# Patient Record
Sex: FEMALE | Race: WHITE | NOT HISPANIC OR LATINO | Employment: OTHER | URBAN - METROPOLITAN AREA
[De-identification: names, ages, dates, MRNs, and addresses within clinical notes are randomized per-mention and may not be internally consistent; named-entity substitution may affect disease eponyms.]

---

## 2017-02-24 PROBLEM — K21.9 GASTROESOPHAGEAL REFLUX DISEASE WITHOUT ESOPHAGITIS: Status: ACTIVE | Noted: 2017-02-24

## 2017-02-24 PROBLEM — R07.9 CHEST PAIN OF UNCERTAIN ETIOLOGY: Status: ACTIVE | Noted: 2017-02-24

## 2017-05-04 DIAGNOSIS — Z12.31 VISIT FOR SCREENING MAMMOGRAM: Primary | ICD-10-CM

## 2017-06-05 ENCOUNTER — HOSPITAL ENCOUNTER (OUTPATIENT)
Dept: RADIOLOGY | Facility: HOSPITAL | Age: 72
Discharge: HOME | End: 2017-06-05
Attending: INTERNAL MEDICINE
Payer: MEDICARE

## 2017-06-05 DIAGNOSIS — Z12.31 VISIT FOR SCREENING MAMMOGRAM: ICD-10-CM

## 2017-06-05 PROCEDURE — 77067 SCR MAMMO BI INCL CAD: CPT | Mod: TC

## 2017-06-05 PROCEDURE — 77067 SCR MAMMO BI INCL CAD: CPT | Mod: 26,,, | Performed by: RADIOLOGY

## 2017-09-11 ENCOUNTER — HOSPITAL ENCOUNTER (OUTPATIENT)
Dept: CARDIOLOGY | Facility: HOSPITAL | Age: 72
Discharge: HOME | End: 2017-09-11
Attending: INTERNAL MEDICINE
Payer: MEDICARE

## 2017-09-11 DIAGNOSIS — R07.9 CHEST PAIN OF UNCERTAIN ETIOLOGY: ICD-10-CM

## 2017-09-11 DIAGNOSIS — R06.09 DYSPNEA ON EXERTION: ICD-10-CM

## 2017-09-11 DIAGNOSIS — R94.31 NONSPECIFIC ABNORMAL ELECTROCARDIOGRAM (ECG) (EKG): ICD-10-CM

## 2017-09-11 LAB
DIASTOLIC DYSFUNCTION: NO
RETIRED EF AND QEF - SEE NOTES: 65 (ref 55–65)

## 2017-09-11 PROCEDURE — 93351 STRESS TTE COMPLETE: CPT

## 2017-09-11 NOTE — PROGRESS NOTES
0900 Pt on table, ready for test. Pt states NKDA, denies hx of glaucoma  Connected to monitor EKG and NIBP. 22g R FA piv established.   0926 Emir Zaragoza MD at bedside to explain procedure and obtain consent.  0927 Test started per protocol, see EKG sheet for VS  0928 Dobutamine at 10mcg to increase HR, target HR 126bpm.   0931 Dobutamine at 20mcg, pt tolerating well  0934 Dobutamine at 30mcg, leg exercises and hand ball given.  Target HR obtained, tech taking pics.   0936 Testing phase completed, dobutamine off, NS up at rapid rate for recovery phase.  0943 Recovery phase completed. Pt states feels normal, no distress, NS d/c'd, approx  200cc infused. Iv d/c'd, pt released to cardio.

## 2017-10-12 PROBLEM — R10.9 ACUTE ABDOMINAL PAIN: Status: ACTIVE | Noted: 2017-10-12

## 2017-10-12 PROBLEM — E87.6 HYPOKALEMIA: Status: ACTIVE | Noted: 2017-10-12

## 2017-10-12 PROBLEM — R55 SYNCOPE: Status: ACTIVE | Noted: 2017-10-12

## 2017-10-12 PROBLEM — E83.42 HYPOMAGNESEMIA: Status: ACTIVE | Noted: 2017-10-12

## 2017-10-12 PROBLEM — E87.1 HYPONATREMIA: Status: ACTIVE | Noted: 2017-10-12

## 2017-10-13 PROBLEM — R10.84 GENERALIZED ABDOMINAL PAIN: Status: ACTIVE | Noted: 2017-10-13

## 2017-10-17 PROBLEM — R10.84 GENERALIZED ABDOMINAL PAIN: Status: ACTIVE | Noted: 2017-10-17

## 2017-10-18 PROBLEM — K29.50 CHRONIC GASTRITIS WITHOUT BLEEDING: Status: ACTIVE | Noted: 2017-10-18

## 2017-11-07 ENCOUNTER — OFFICE VISIT (OUTPATIENT)
Dept: UROLOGY | Facility: CLINIC | Age: 72
End: 2017-11-07
Payer: MEDICARE

## 2017-11-07 VITALS
WEIGHT: 135 LBS | HEART RATE: 118 BPM | BODY MASS INDEX: 23.05 KG/M2 | HEIGHT: 64 IN | DIASTOLIC BLOOD PRESSURE: 86 MMHG | SYSTOLIC BLOOD PRESSURE: 137 MMHG

## 2017-11-07 DIAGNOSIS — R10.2 SUPRAPUBIC PAIN: ICD-10-CM

## 2017-11-07 DIAGNOSIS — R30.0 DYSURIA: ICD-10-CM

## 2017-11-07 DIAGNOSIS — N39.0 URINARY TRACT INFECTION WITHOUT HEMATURIA, SITE UNSPECIFIED: Primary | ICD-10-CM

## 2017-11-07 PROCEDURE — 87186 SC STD MICRODIL/AGAR DIL: CPT

## 2017-11-07 PROCEDURE — 87086 URINE CULTURE/COLONY COUNT: CPT

## 2017-11-07 PROCEDURE — 87088 URINE BACTERIA CULTURE: CPT

## 2017-11-07 PROCEDURE — 99204 OFFICE O/P NEW MOD 45 MIN: CPT | Mod: 25,S$GLB,, | Performed by: STUDENT IN AN ORGANIZED HEALTH CARE EDUCATION/TRAINING PROGRAM

## 2017-11-07 PROCEDURE — 99999 PR PBB SHADOW E&M-EST. PATIENT-LVL IV: CPT | Mod: PBBFAC,,, | Performed by: STUDENT IN AN ORGANIZED HEALTH CARE EDUCATION/TRAINING PROGRAM

## 2017-11-07 PROCEDURE — 81002 URINALYSIS NONAUTO W/O SCOPE: CPT | Mod: S$GLB,,, | Performed by: STUDENT IN AN ORGANIZED HEALTH CARE EDUCATION/TRAINING PROGRAM

## 2017-11-07 PROCEDURE — 87077 CULTURE AEROBIC IDENTIFY: CPT

## 2017-11-07 PROCEDURE — 81001 URINALYSIS AUTO W/SCOPE: CPT

## 2017-11-07 RX ORDER — NITROFURANTOIN 25; 75 MG/1; MG/1
100 CAPSULE ORAL 2 TIMES DAILY
Qty: 14 CAPSULE | Refills: 0 | Status: SHIPPED | OUTPATIENT
Start: 2017-11-07 | End: 2018-03-27

## 2017-11-07 RX ORDER — PHENAZOPYRIDINE HYDROCHLORIDE 200 MG/1
200 TABLET, FILM COATED ORAL 3 TIMES DAILY PRN
Qty: 15 TABLET | Refills: 0 | Status: SHIPPED | OUTPATIENT
Start: 2017-11-07 | End: 2017-11-17

## 2017-11-07 RX ORDER — ESTRADIOL 0.1 MG/G
CREAM VAGINAL
COMMUNITY
Start: 2017-10-22 | End: 2020-07-30

## 2017-11-07 NOTE — PROGRESS NOTES
Subjective:       Patient ID: Autumn Harris is a 72 y.o. female.    Chief Complaint:  Urinary tract infection concern  This is a 72 y.o.  female patient that is new to me. She was added on to the clinic today because she was worried about the development of a UTI. She began to have urinary issues since last night. She has been experiencing dysuria. Denies gross hematuria. It appeared cloudy at times. She has been drinking much fluids. She notes that currently the urine is light. She does tub soak occasionally. Does wipe front to back.     She was in the hospital recently from LOC and abdominal issues. She has been following up with GI as an outpatient.     She notes a remote history of a UTI. No UTIs recently.     Lab Results   Component Value Date    CREATININE 0.76 10/18/2017       I personally reviewed the images: CT of abdomen/pelvis with contrast - 10/12/17 - no renal masses. Left simple appearing renal cysts. No hydronephrosis or hydroureter bilaterally. No kidney stones identified.  Kidneys demonstrate no hydronephrosis or obstructing calculus.  Several left renal cysts are demonstrated.       ---  Past Medical History:   Diagnosis Date    Anxiety     COPD (chronic obstructive pulmonary disease)     Depression     Fibromyalgia     High cholesterol     Hypertension     Oral cancer     Urinary tract infection        Past Surgical History:   Procedure Laterality Date    ABDOMINAL SURGERY      [facility]    HYSTERECTOMY      Stomach Ulcer Surgery         No family history on file.    Social History   Substance Use Topics    Smoking status: Former Smoker    Smokeless tobacco: Former User     Quit date: 1/1/1995    Alcohol use No       Current Outpatient Prescriptions on File Prior to Visit   Medication Sig Dispense Refill    azelastine (ASTELIN) 137 mcg (0.1 %) nasal spray 2 SPRAY(S) INTO EACH NOSTRIL TWICE A DAY  5    busPIRone (BUSPAR) 5 MG Tab Take 5 mg by mouth 2 (two) times daily.       clorazepate (TRANXENE) 3.75 MG Tab Take 7.5 mg by mouth 3 (three) times daily.      dicyclomine (BENTYL) 10 MG capsule Take 1 capsule (10 mg total) by mouth before meals and at bedtime as needed (abd pain). 30 capsule 1    fluticasone (FLONASE) 50 mcg/actuation nasal spray 2 SPRAYS DAILY PER NOSTRIL  0    lisinopril 10 MG tablet Take 1 tablet (10 mg total) by mouth once daily. 30 tablet 1    meclizine (ANTIVERT) 12.5 mg tablet       montelukast (SINGULAIR) 10 mg tablet Take 10 mg by mouth once daily.  11    pantoprazole (PROTONIX) 40 MG tablet Take 1 tablet (40 mg total) by mouth once daily. 30 tablet 1    tramadol-acetaminophen 37.5-325 mg (ULTRACET) 37.5-325 mg Tab Take 1 tablet by mouth every 4 (four) hours as needed for Pain.      VENTOLIN HFA 90 mcg/actuation inhaler       zolpidem (AMBIEN) 10 mg Tab Take 5 mg by mouth nightly as needed.      ropinirole (REQUIP) 1 MG tablet       sucralfate (CARAFATE) 1 gram tablet Take 1 tablet (1 g total) by mouth 4 (four) times daily before meals and nightly. 120 tablet 0     No current facility-administered medications on file prior to visit.        Review of patient's allergies indicates:   Allergen Reactions    Pcn [penicillins]        Review of Systems   Constitutional: Negative for activity change.   HENT: Negative for congestion.    Eyes: Negative for visual disturbance.   Respiratory: Negative for shortness of breath.    Cardiovascular: Negative for chest pain.   Gastrointestinal: Negative for abdominal distention.   Musculoskeletal: Negative for gait problem.   Skin: Negative for color change.   Neurological: Negative for dizziness.   Psychiatric/Behavioral: Negative for agitation.       Objective:      Physical Exam   Constitutional: She is oriented to person, place, and time. She appears well-developed.   HENT:   Head: Normocephalic and atraumatic.   Eyes: EOM are normal.   Neck: Normal range of motion.   Cardiovascular: Intact distal pulses.     Pulmonary/Chest: Effort normal.   Musculoskeletal: Normal range of motion.   Neurological: She is alert and oriented to person, place, and time.   Skin: Skin is warm and dry.   Psychiatric: She has a normal mood and affect.       Assessment:       1. Urinary tract infection without hematuria, site unspecified    2. Dysuria    3. Suprapubic pain        Plan:         1. Will send urine specimen for culture, urinalysis. Her POCT urinalysis today demonstrated blood, leukocytes. Her symptoms are consistent with the development of an acute UTI. Will empirically treat with macrobid for antibiotics and pyridium for dysuria. Will call her with results of urine culture.  2. CT reviewed - no adverse findings to put patient at higher risk for urinary tract infections such as kidney stones were identified. Shared this with the patient today.  3. Followup with PCP, followup with me PRN    Urinary tract infection without hematuria, site unspecified  -     Urine culture  -     Urinalysis  -     Urinalysis Microscopic  -     POCT URINE DIPSTICK WITHOUT MICROSCOPE    Dysuria    Suprapubic pain    Other orders  -     phenazopyridine (PYRIDIUM) 200 MG tablet; Take 1 tablet (200 mg total) by mouth 3 (three) times daily as needed for Pain (use it for burning with urination).  Dispense: 15 tablet; Refill: 0  -     nitrofurantoin, macrocrystal-monohydrate, (MACROBID) 100 MG capsule; Take 1 capsule (100 mg total) by mouth 2 (two) times daily.  Dispense: 14 capsule; Refill: 0

## 2017-11-08 LAB
BACTERIA #/AREA URNS AUTO: ABNORMAL /HPF
BILIRUB UR QL STRIP: NEGATIVE
CLARITY UR REFRACT.AUTO: ABNORMAL
COLOR UR AUTO: YELLOW
GLUCOSE UR QL STRIP: NEGATIVE
HGB UR QL STRIP: ABNORMAL
HYALINE CASTS UR QL AUTO: 0 /LPF
KETONES UR QL STRIP: NEGATIVE
LEUKOCYTE ESTERASE UR QL STRIP: ABNORMAL
MICROSCOPIC COMMENT: ABNORMAL
NITRITE UR QL STRIP: POSITIVE
PH UR STRIP: 6 [PH] (ref 5–8)
PROT UR QL STRIP: ABNORMAL
RBC #/AREA URNS AUTO: 11 /HPF (ref 0–4)
SP GR UR STRIP: 1 (ref 1–1.03)
SQUAMOUS #/AREA URNS AUTO: 1 /HPF
URN SPEC COLLECT METH UR: ABNORMAL
UROBILINOGEN UR STRIP-ACNC: NEGATIVE EU/DL
WBC #/AREA URNS AUTO: >100 /HPF (ref 0–5)
WBC CLUMPS UR QL AUTO: ABNORMAL

## 2017-11-10 ENCOUNTER — TELEPHONE (OUTPATIENT)
Dept: UROLOGY | Facility: CLINIC | Age: 72
End: 2017-11-10

## 2017-11-10 LAB — BACTERIA UR CULT: NORMAL

## 2017-11-10 NOTE — TELEPHONE ENCOUNTER
----- Message from Abi Romo MD sent at 11/10/2017 12:57 PM CST -----  Please call patient and notify her that she does have a UTI based off of the urine culture. She is already on macrobid which the bacteria is sensitive to. She is to finish the course of antibiotics.

## 2018-03-21 ENCOUNTER — HOSPITAL ENCOUNTER (EMERGENCY)
Facility: HOSPITAL | Age: 73
Discharge: HOME | End: 2018-03-21
Attending: EMERGENCY MEDICINE
Payer: MEDICARE

## 2018-03-21 VITALS
OXYGEN SATURATION: 99 % | BODY MASS INDEX: 23.99 KG/M2 | TEMPERATURE: 98 F | SYSTOLIC BLOOD PRESSURE: 158 MMHG | DIASTOLIC BLOOD PRESSURE: 76 MMHG | HEIGHT: 65 IN | WEIGHT: 144 LBS | RESPIRATION RATE: 18 BRPM | HEART RATE: 87 BPM
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
## 2018-03-21 DIAGNOSIS — M25.561 PAIN AND SWELLING OF KNEE, RIGHT: ICD-10-CM

## 2018-03-21 DIAGNOSIS — M25.461 PAIN AND SWELLING OF KNEE, RIGHT: ICD-10-CM

## 2018-03-21 DIAGNOSIS — S89.91XA RIGHT KNEE INJURY, INITIAL ENCOUNTER: Primary | ICD-10-CM

## 2018-03-21 PROCEDURE — 25000003 PHARM REV CODE 250: Performed by: PHYSICIAN ASSISTANT

## 2018-03-21 PROCEDURE — 99283 EMERGENCY DEPT VISIT LOW MDM: CPT

## 2018-03-21 RX ORDER — OXYCODONE AND ACETAMINOPHEN 5; 325 MG/1; MG/1
1 TABLET ORAL
Status: COMPLETED | OUTPATIENT
Start: 2018-03-21 | End: 2018-03-21

## 2018-03-21 RX ADMIN — OXYCODONE HYDROCHLORIDE AND ACETAMINOPHEN 0.5 TABLET: 5; 325 TABLET ORAL at 01:03

## 2018-03-21 NOTE — ED NOTES
Attempted to discharge pt, pt request that discharge paperwork be gone over when daughter arrives. Pt states daughter is on her way back

## 2018-03-21 NOTE — ED PROVIDER NOTES
Encounter Date: 3/21/2018       History     Chief Complaint   Patient presents with    Fall     pt tripped and fell yesterday, scraping both knees. C/o pain to entire right leg     72 year old female with PMH of fibromyalgia presents complaining of right knee and leg pain following a fall yesterday at about 7:30PM. She states that she tripped on uneven concrete, fell from standing, and landed primarily on her right knee and both wrists. She states the pain runs along the posterior aspect of her knee, calf, and thigh and describes it as a pulling sensation. She can ambulate with increased pain in the posterior and anterior aspects of her knee. She also says she can not bend her knee fully, that it is swollen, and she does have an abrasion. She denies hip pain, back pain, head trauma, ankle pain, and wrist pain. She denies prior pain to the right knee. She states she took tramadol last night and this morning, as she is prescribed this for fibromyalgia pain.  It did not help with the knee pain.      The history is provided by the patient. No  was used.     Review of patient's allergies indicates:   Allergen Reactions    Pcn [penicillins]     Sulfa (sulfonamide antibiotics)      Past Medical History:   Diagnosis Date    Anxiety     COPD (chronic obstructive pulmonary disease)     Depression     Fibromyalgia     High cholesterol     Hypertension     Oral cancer     Urinary tract infection      Past Surgical History:   Procedure Laterality Date    ABDOMINAL SURGERY      [facility]    HYSTERECTOMY      Stomach Ulcer Surgery       History reviewed. No pertinent family history.  Social History   Substance Use Topics    Smoking status: Former Smoker    Smokeless tobacco: Former User     Quit date: 1/1/1995    Alcohol use No     Review of Systems   Constitutional: Negative for chills and fever.   Respiratory: Negative for cough and chest tightness.    Cardiovascular: Negative for chest pain.    Gastrointestinal: Negative for abdominal pain, constipation, diarrhea, nausea and vomiting.   Musculoskeletal: Positive for arthralgias and joint swelling.        Knee pain   Allergic/Immunologic: Negative for immunocompromised state.   Psychiatric/Behavioral: Negative for confusion.   All other systems reviewed and are negative.      Physical Exam     Initial Vitals [03/21/18 1145]   BP Pulse Resp Temp SpO2   133/83 94 20 98 °F (36.7 °C) 97 %      MAP       99.67         Physical Exam    Nursing note and vitals reviewed.  Constitutional: Vital signs are normal. She appears well-developed and well-nourished. No distress.   HENT:   Head: Normocephalic and atraumatic.   Nose: Nose normal.   Eyes: Conjunctivae and lids are normal.   Neck: Normal range of motion and phonation normal.   Cardiovascular: Intact distal pulses.   Pulmonary/Chest: Effort normal. No respiratory distress.   Abdominal: Normal appearance.   Musculoskeletal:        Right hip: Normal.        Right knee: She exhibits decreased range of motion, swelling and effusion. She exhibits no deformity, no LCL laxity, normal patellar mobility and no MCL laxity.        Right ankle: Normal.        Lumbar back: Normal.        Legs:  Pain with movement of right knee. Large effusion present. Tender to palpation over posterior aspect of knee. Superficial abrasion over patella.   Neurological: She is alert and oriented to person, place, and time.   Decreased strength with hip flexion, knee flexion and extension on right side.    Skin: Skin is warm and dry.   Psychiatric: She has a normal mood and affect. Her speech is normal and behavior is normal. Judgment and thought content normal. Cognition and memory are normal.         ED Course   Procedures  Labs Reviewed - No data to display     Imaging Results          X-Ray Knee 3 View Right (Final result)  Result time 03/21/18 14:04:21    Final result by Valerio Astorga MD (03/21/18 14:04:21)                  Impression:      1. No acute displaced fracture or dislocation of the knee.      Electronically signed by: Valerio Astorga MD  Date:    03/21/2018  Time:    14:04             Narrative:    EXAMINATION:  XR KNEE 3 VIEW RIGHT    CLINICAL HISTORY:  Pain in right knee    TECHNIQUE:  AP, lateral, and Merchant views of the right knee were performed.    COMPARISON:  None    FINDINGS:  Three views.    Degenerative changes are noted of the knee.  No large knee joint effusion.  No radiopaque foreign body.                                   Medical Decision Making:   Initial Assessment:   72 year old female with PMH of Fibromyalgia c/o right knee pain following a fall from standing 1 day ago. Admits to increased pain with ambulation. Denies ankle, hip or back pain. Hip fracture unlikely due to ability to ambulate. PE reveals an abrasion to the anterior aspect of the knee, a large effusion, and tender to palpation along the posterior and anterior aspects of the knee. Decreased ROM with knee flexion and decreased strength with knee flexion and extension. Likely due to effusion.   Differential Diagnosis:   Joint effusion  Patella fracture    Clinical Tests:   Radiological Study: Ordered and Reviewed  ED Management:  X-ray of right knee ordered. Percocet 5-375mg PO 1/2 tablet ordered for pain.     3:00 pm: X-ray showed no fracture or dislocation of knee. Will ACE wrap right knee and patient will be discharged home with instructions to RICE and follow up with orthopaedics. She should continue her home tramadol as prescribed and can add ibuprofen 600mg every 4-6 hours for pain and inflammation.  Instructed to use a walker or cane until she is fully weight bearing and feels secure on the knee.   Patient was discussed with Dr Vasquez who agrees with treatment plan.              Attending Attestation:     Physician Attestation Statement for NP/PA:   I have conducted a face to face encounter with this patient in addition to the NP/PA, due to  NP/PA Request    Other NP/PA Attestation Additions:    History of Present Illness: 72F with right knee pain following fall from standing.  Associated swelling.    Medical Decision Making: Effusion on exam but no fx on xray.  Treat with ACE, RICE.  Motrin and ultram.                    Clinical Impression:   The primary encounter diagnosis was Right knee injury, initial encounter. Diagnoses of Pain and swelling of knee, right and Fall, initial encounter were also pertinent to this visit.                           Josie Vasquez MD  03/21/18 2203

## 2018-03-21 NOTE — ED TRIAGE NOTES
Pt sates she tripped on uneven pavement yesterday scrapping knees, and now has right leg pain with lower back pain. No acute distress noted. Pain worsens with weight bearing, and ambulation. Skin warm and dry. Pt denies loss of consciousness.

## 2018-03-27 ENCOUNTER — OFFICE VISIT (OUTPATIENT)
Dept: CARDIOLOGY | Facility: CLINIC | Age: 73
End: 2018-03-27
Payer: MEDICARE

## 2018-03-27 VITALS
HEIGHT: 65 IN | HEART RATE: 78 BPM | BODY MASS INDEX: 22.89 KG/M2 | DIASTOLIC BLOOD PRESSURE: 70 MMHG | SYSTOLIC BLOOD PRESSURE: 126 MMHG | WEIGHT: 137.38 LBS

## 2018-03-27 DIAGNOSIS — R06.09 DYSPNEA ON EXERTION: ICD-10-CM

## 2018-03-27 DIAGNOSIS — R55 SYNCOPE, UNSPECIFIED SYNCOPE TYPE: ICD-10-CM

## 2018-03-27 DIAGNOSIS — R94.31 NONSPECIFIC ABNORMAL ELECTROCARDIOGRAM (ECG) (EKG): ICD-10-CM

## 2018-03-27 DIAGNOSIS — I35.1 AORTIC VALVE INSUFFICIENCY, ETIOLOGY OF CARDIAC VALVE DISEASE UNSPECIFIED: ICD-10-CM

## 2018-03-27 DIAGNOSIS — I10 ESSENTIAL HYPERTENSION, BENIGN: ICD-10-CM

## 2018-03-27 DIAGNOSIS — I49.1 PREMATURE ATRIAL CONTRACTIONS: ICD-10-CM

## 2018-03-27 DIAGNOSIS — I10 ESSENTIAL HYPERTENSION: Primary | ICD-10-CM

## 2018-03-27 PROCEDURE — 3078F DIAST BP <80 MM HG: CPT | Mod: CPTII,S$GLB,, | Performed by: INTERNAL MEDICINE

## 2018-03-27 PROCEDURE — 99999 PR PBB SHADOW E&M-EST. PATIENT-LVL II: CPT | Mod: PBBFAC,,, | Performed by: INTERNAL MEDICINE

## 2018-03-27 PROCEDURE — 93000 ELECTROCARDIOGRAM COMPLETE: CPT | Mod: S$GLB,,, | Performed by: INTERNAL MEDICINE

## 2018-03-27 PROCEDURE — 99213 OFFICE O/P EST LOW 20 MIN: CPT | Mod: S$GLB,,, | Performed by: INTERNAL MEDICINE

## 2018-03-27 PROCEDURE — 3074F SYST BP LT 130 MM HG: CPT | Mod: CPTII,S$GLB,, | Performed by: INTERNAL MEDICINE

## 2018-03-27 RX ORDER — PANTOPRAZOLE SODIUM 40 MG/1
40 TABLET, DELAYED RELEASE ORAL DAILY
COMMUNITY
End: 2019-04-11

## 2018-03-27 RX ORDER — TRAMADOL HYDROCHLORIDE 50 MG/1
50 TABLET ORAL EVERY 8 HOURS PRN
COMMUNITY
End: 2021-07-13 | Stop reason: SDUPTHER

## 2018-03-27 RX ORDER — DICYCLOMINE HYDROCHLORIDE 10 MG/1
CAPSULE ORAL 3 TIMES DAILY
COMMUNITY
Start: 2018-02-06 | End: 2019-04-11

## 2018-03-27 RX ORDER — TIZANIDINE 4 MG/1
8 TABLET ORAL NIGHTLY
COMMUNITY
Start: 2018-03-03 | End: 2019-07-02

## 2018-03-27 RX ORDER — LISINOPRIL 10 MG/1
10 TABLET ORAL DAILY
Qty: 90 TABLET | Refills: 3 | Status: SHIPPED | OUTPATIENT
Start: 2018-03-27 | End: 2019-05-29 | Stop reason: SDUPTHER

## 2018-03-27 NOTE — PROGRESS NOTES
Subjective:      Patient ID: Autumn Harris is a 72 y.o. female.    Chief Complaint: Follow-up (Hypertension)    HPI:  Feeling Ok.  Pt sees Dr Hernandez  Pt sees Dr Vega.  Pt admitted to [facility] after fainting 10/17 associated with abdominal pain and nausea and vomiting.   Blood pressure meds were reduced=HCTZ was discontinued  .  Pt received PT of weak legs afterwards.    Pt c/o memory problems.    Review of Systems   Cardiovascular: Positive for dyspnea on exertion and palpitations. Negative for chest pain, claudication, irregular heartbeat, leg swelling, near-syncope, orthopnea and syncope.      Heart rhythm was monitored [facility] at [facility]  Past Medical History:   Diagnosis Date    Anxiety     COPD (chronic obstructive pulmonary disease)     Depression     Fibromyalgia     High cholesterol     Hypertension     Oral cancer     Urinary tract infection         Past Surgical History:   Procedure Laterality Date    ABDOMINAL SURGERY      [facility]    HYSTERECTOMY      Stomach Ulcer Surgery         No family history on file.    Social History     Social History    Marital status:      Spouse name: N/A    Number of children: N/A    Years of education: N/A     Social History Main Topics    Smoking status: Former Smoker    Smokeless tobacco: Former User     Quit date: 1/1/1995    Alcohol use No    Drug use: Unknown    Sexual activity: Not Asked     Other Topics Concern    None     Social History Narrative    None       Current Outpatient Prescriptions on File Prior to Visit   Medication Sig Dispense Refill    azelastine (ASTELIN) 137 mcg (0.1 %) nasal spray 2 SPRAY(S) INTO EACH NOSTRIL TWICE A DAY  5    busPIRone (BUSPAR) 5 MG Tab Take 5 mg by mouth 2 (two) times daily.      clorazepate (TRANXENE) 3.75 MG Tab Take 7.5 mg by mouth 2 (two) times daily.       ESTRACE 0.01 % (0.1 mg/gram) vaginal cream       fluticasone (FLONASE) 50 mcg/actuation nasal spray 2 SPRAYS DAILY  "PER NOSTRIL  0    meclizine (ANTIVERT) 12.5 mg tablet as needed.       montelukast (SINGULAIR) 10 mg tablet Take 10 mg by mouth once daily.  11    tramadol-acetaminophen 37.5-325 mg (ULTRACET) 37.5-325 mg Tab Take 1 tablet by mouth every 4 (four) hours as needed for Pain.      VENTOLIN HFA 90 mcg/actuation inhaler Inhale 2 puffs into the lungs as needed.       zolpidem (AMBIEN) 10 mg Tab Take 5 mg by mouth nightly as needed.      [DISCONTINUED] lisinopril 10 MG tablet Take 1 tablet (10 mg total) by mouth once daily. 30 tablet 1    [DISCONTINUED] nitrofurantoin, macrocrystal-monohydrate, (MACROBID) 100 MG capsule Take 1 capsule (100 mg total) by mouth 2 (two) times daily. 14 capsule 0    [DISCONTINUED] ranitidine (ZANTAC) 300 MG tablet TAKE 1 TABLET (300 MG TOTAL) BY MOUTH EVERY EVENING. 30 tablet 11    [DISCONTINUED] ropinirole (REQUIP) 1 MG tablet        No current facility-administered medications on file prior to visit.        Review of patient's allergies indicates:   Allergen Reactions    Pcn [penicillins]     Sulfa (sulfonamide antibiotics)      Objective:     Vitals:    03/27/18 1559   BP: 126/70   BP Location: Left arm   Patient Position: Sitting   BP Method: Large (Automatic)   Pulse: 78   Weight: 62.3 kg (137 lb 6.4 oz)   Height: 5' 5" (1.651 m)        Physical Exam   Constitutional: She is oriented to person, place, and time. She appears well-developed and well-nourished.   Eyes: No scleral icterus.   Neck: No JVD present. Carotid bruit is not present.   Cardiovascular: Normal rate and regular rhythm.  Exam reveals no gallop.    Murmur (II/VI systolic) heard.  Pulmonary/Chest: Breath sounds normal.   Musculoskeletal: She exhibits no edema.   Neurological: She is alert and oriented to person, place, and time.   Skin: Skin is warm and dry.   Psychiatric: She has a normal mood and affect. Her behavior is normal. Judgment and thought content normal.   Vitals reviewed.     Wt down 6 lbs    ECG " today--NSR with PAC and PVC. QS pattern V1-V3, unchanged    Note dobutamine stress echo last year was normal  Assessment:     1. Essential hypertension    2. Nonspecific abnormal electrocardiogram (ECG) (EKG)    3. Premature atrial contractions    4. Aortic valve insufficiency, etiology of cardiac valve disease unspecified    5. Dyspnea on exertion    6. Syncope, unspecified syncope type    7. Essential hypertension, benign      Plan:   Autumn was seen today for follow-up.    Diagnoses and all orders for this visit:    Essential hypertension  -     IN OFFICE EKG 12-LEAD (to [facility])    Nonspecific abnormal electrocardiogram (ECG) (EKG)    Premature atrial contractions    Aortic valve insufficiency, etiology of cardiac valve disease unspecified    Dyspnea on exertion    Syncope, unspecified syncope type  -     IN OFFICE EKG 12-LEAD (to Muse)    Essential hypertension, benign  -     IN OFFICE EKG 12-LEAD (to [place])    Other orders  -     lisinopril 10 MG tablet; Take 1 tablet (10 mg total) by mouth once daily.    Same meds       Follow-up in about 6 months (around 9/27/2018).

## 2018-11-08 ENCOUNTER — OFFICE VISIT (OUTPATIENT)
Dept: CARDIOLOGY | Facility: CLINIC | Age: 73
End: 2018-11-08
Payer: MEDICARE

## 2018-11-08 VITALS
HEART RATE: 85 BPM | OXYGEN SATURATION: 98 % | WEIGHT: 144.19 LBS | DIASTOLIC BLOOD PRESSURE: 69 MMHG | HEIGHT: 65 IN | BODY MASS INDEX: 24.02 KG/M2 | SYSTOLIC BLOOD PRESSURE: 103 MMHG

## 2018-11-08 DIAGNOSIS — R06.09 DYSPNEA ON EXERTION: ICD-10-CM

## 2018-11-08 DIAGNOSIS — I49.1 PREMATURE ATRIAL CONTRACTIONS: ICD-10-CM

## 2018-11-08 DIAGNOSIS — I10 ESSENTIAL HYPERTENSION: Primary | ICD-10-CM

## 2018-11-08 DIAGNOSIS — J41.0 SIMPLE CHRONIC BRONCHITIS: ICD-10-CM

## 2018-11-08 DIAGNOSIS — I35.1 AORTIC VALVE INSUFFICIENCY, ETIOLOGY OF CARDIAC VALVE DISEASE UNSPECIFIED: ICD-10-CM

## 2018-11-08 DIAGNOSIS — R94.31 NONSPECIFIC ABNORMAL ELECTROCARDIOGRAM (ECG) (EKG): ICD-10-CM

## 2018-11-08 PROCEDURE — 1101F PT FALLS ASSESS-DOCD LE1/YR: CPT | Mod: CPTII,S$GLB,, | Performed by: INTERNAL MEDICINE

## 2018-11-08 PROCEDURE — 99213 OFFICE O/P EST LOW 20 MIN: CPT | Mod: S$GLB,,, | Performed by: INTERNAL MEDICINE

## 2018-11-08 PROCEDURE — 3074F SYST BP LT 130 MM HG: CPT | Mod: CPTII,S$GLB,, | Performed by: INTERNAL MEDICINE

## 2018-11-08 PROCEDURE — 99999 PR PBB SHADOW E&M-EST. PATIENT-LVL III: CPT | Mod: PBBFAC,,, | Performed by: INTERNAL MEDICINE

## 2018-11-08 PROCEDURE — 3078F DIAST BP <80 MM HG: CPT | Mod: CPTII,S$GLB,, | Performed by: INTERNAL MEDICINE

## 2018-11-08 PROCEDURE — 93000 ELECTROCARDIOGRAM COMPLETE: CPT | Mod: S$GLB,,, | Performed by: INTERNAL MEDICINE

## 2018-11-08 RX ORDER — POLYETHYLENE GLYCOL 3350 17 G/17G
POWDER, FOR SOLUTION ORAL
Refills: 5 | COMMUNITY
Start: 2018-10-30 | End: 2021-07-13

## 2018-11-08 NOTE — PROGRESS NOTES
"  Subjective:      Patient ID: Autumn Harris is a 73 y.o. female.    Chief Complaint: Follow-up (Hypertension) and Dizziness    HPI: Scheduled for Eply exercises per Dr Cedeño for dizziness.  Had basal cell CA removed from face.    Review of Systems   Cardiovascular: Positive for dyspnea on exertion (mild, chronic). Negative for chest pain, claudication, irregular heartbeat, leg swelling, near-syncope, orthopnea, palpitations and syncope.      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     Mows the lawn.    "I have fibromyalgia"  Past Medical History:   Diagnosis Date    Anxiety     COPD (chronic obstructive pulmonary disease)     Depression     Fibromyalgia     High cholesterol     Hypertension     Oral cancer     Urinary tract infection         Past Surgical History:   Procedure Laterality Date    ABDOMINAL SURGERY      [facility]    ESOPHAGOGASTRODUODENOSCOPY (EGD) N/A 10/17/2017    [facility]    HYSTERECTOMY      Stomach Ulcer Surgery      ULTRASOUND-ENDOSCOPIC-UPPER Left 10/17/2017    [facility] at [facility] ENDO       No family history on file.    Social History     Socioeconomic History    Marital status:      Spouse name: None    Number of children: None    Years of education: None    Highest education level: None   Social Needs    Financial resource strain: None    Food insecurity - worry: None    Food insecurity - inability: None    Transportation needs - medical: None    Transportation needs - non-medical: None   Occupational History    None   Tobacco Use    Smoking status: Former Smoker    Smokeless tobacco: Former User     Quit date: 1/1/1995   Substance and Sexual Activity    Alcohol use: No    Drug use: None    Sexual activity: None   Other Topics Concern    None   Social History Narrative    None       Current Outpatient Medications on File Prior to Visit   Medication Sig Dispense Refill    " "busPIRone (BUSPAR) 5 MG Tab Take 5 mg by mouth 2 (two) times daily.      clorazepate (TRANXENE) 3.75 MG Tab Take 7.5 mg by mouth 2 (two) times daily.       dicyclomine (BENTYL) 10 MG capsule 3 (three) times daily.      ESTRACE 0.01 % (0.1 mg/gram) vaginal cream       lisinopril 10 MG tablet Take 1 tablet (10 mg total) by mouth once daily. 90 tablet 3    meclizine (ANTIVERT) 12.5 mg tablet as needed.       montelukast (SINGULAIR) 10 mg tablet Take 10 mg by mouth once daily.  11    pantoprazole (PROTONIX) 40 MG tablet Take 40 mg by mouth once daily.      tiZANidine (ZANAFLEX) 4 MG tablet 8 mg every evening.      traMADol (ULTRAM) 50 mg tablet Take 50 mg by mouth every 8 (eight) hours as needed for Pain.      VENTOLIN HFA 90 mcg/actuation inhaler Inhale 2 puffs into the lungs as needed.       zolpidem (AMBIEN) 10 mg Tab Take 5 mg by mouth nightly as needed.      azelastine (ASTELIN) 137 mcg (0.1 %) nasal spray 2 SPRAY(S) INTO EACH NOSTRIL TWICE A DAY  5    fluticasone (FLONASE) 50 mcg/actuation nasal spray 2 SPRAYS DAILY PER NOSTRIL  0    polyethylene glycol (GLYCOLAX) 17 gram/dose powder MIX 17 G WITH LIQUID EVERY DAY BY ORAL ROUTE FOR 30 DAYS  5    [DISCONTINUED] tramadol-acetaminophen 37.5-325 mg (ULTRACET) 37.5-325 mg Tab Take 1 tablet by mouth every 4 (four) hours as needed for Pain.       No current facility-administered medications on file prior to visit.        Review of patient's allergies indicates:   Allergen Reactions    Pcn [penicillins]     Sulfa (sulfonamide antibiotics)      Objective:     Vitals:    11/08/18 1503   BP: 103/69   BP Location: Left arm   Patient Position: Sitting   BP Method: Large (Automatic)   Pulse: 85   SpO2: 98%   Weight: 65.4 kg (144 lb 3.2 oz)   Height: 5' 5" (1.651 m)        Physical Exam   Constitutional: She is oriented to person, place, and time. She appears well-developed and well-nourished.   Eyes: No scleral icterus.   Neck: No JVD present. Carotid bruit is " not present.   Cardiovascular: Normal rate and regular rhythm. Exam reveals no gallop.   No murmur heard.  Pulmonary/Chest: Breath sounds normal.   Musculoskeletal: She exhibits no edema.   Neurological: She is alert and oriented to person, place, and time.   Skin: Skin is warm and dry.   Psychiatric: She has a normal mood and affect. Her behavior is normal. Judgment and thought content normal.   Vitals reviewed.     ECG: NSR, QS pattern V1 to V3 c/w anteroseptal infarct, unchanged    Note normal dobutamine stress echo in 2017  Assessment:     1. Essential hypertension    2. Nonspecific abnormal electrocardiogram (ECG) (EKG)    3. Premature atrial contractions    4. Aortic valve insufficiency, etiology of cardiac valve disease unspecified    5. Dyspnea on exertion    6. Simple chronic bronchitis      Plan:   Autumn was seen today for follow-up and dizziness.    Diagnoses and all orders for this visit:    Essential hypertension    Nonspecific abnormal electrocardiogram (ECG) (EKG)    Premature atrial contractions    Aortic valve insufficiency, etiology of cardiac valve disease unspecified    Dyspnea on exertion    Simple chronic bronchitis    Other orders  -     IN OFFICE EKG 12-LEAD (to [facility])     Same meds  RTC 6 months  F/u with Dr Vega who does labs  F/u with Dr Cedeño for dizziness      Follow-up in about 6 months (around 5/8/2019).

## 2019-03-11 DIAGNOSIS — Z12.39 BREAST SCREENING: Primary | ICD-10-CM

## 2019-03-22 ENCOUNTER — HOSPITAL ENCOUNTER (OUTPATIENT)
Dept: RADIOLOGY | Facility: HOSPITAL | Age: 74
Discharge: HOME | End: 2019-03-22
Attending: INTERNAL MEDICINE
Payer: MEDICARE

## 2019-03-22 DIAGNOSIS — Z12.39 BREAST SCREENING: ICD-10-CM

## 2019-03-22 PROCEDURE — 77067 SCR MAMMO BI INCL CAD: CPT | Mod: TC

## 2019-03-22 PROCEDURE — 77067 MAMMO DIGITAL SCREENING BILAT WITH CAD: ICD-10-PCS | Mod: 26,,, | Performed by: RADIOLOGY

## 2019-03-22 PROCEDURE — 77067 SCR MAMMO BI INCL CAD: CPT | Mod: 26,,, | Performed by: RADIOLOGY

## 2019-04-11 ENCOUNTER — OFFICE VISIT (OUTPATIENT)
Dept: CARDIOLOGY | Facility: CLINIC | Age: 74
End: 2019-04-11
Payer: MEDICARE

## 2019-04-11 VITALS
WEIGHT: 139.31 LBS | HEIGHT: 65 IN | DIASTOLIC BLOOD PRESSURE: 85 MMHG | BODY MASS INDEX: 23.21 KG/M2 | HEART RATE: 66 BPM | SYSTOLIC BLOOD PRESSURE: 128 MMHG

## 2019-04-11 DIAGNOSIS — I35.1 AORTIC VALVE INSUFFICIENCY, ETIOLOGY OF CARDIAC VALVE DISEASE UNSPECIFIED: ICD-10-CM

## 2019-04-11 DIAGNOSIS — I49.1 PREMATURE ATRIAL CONTRACTIONS: ICD-10-CM

## 2019-04-11 DIAGNOSIS — R94.31 NONSPECIFIC ABNORMAL ELECTROCARDIOGRAM (ECG) (EKG): ICD-10-CM

## 2019-04-11 DIAGNOSIS — R06.09 DYSPNEA ON EXERTION: ICD-10-CM

## 2019-04-11 DIAGNOSIS — K21.9 GASTROESOPHAGEAL REFLUX DISEASE WITHOUT ESOPHAGITIS: ICD-10-CM

## 2019-04-11 DIAGNOSIS — J41.0 SIMPLE CHRONIC BRONCHITIS: ICD-10-CM

## 2019-04-11 DIAGNOSIS — I10 ESSENTIAL HYPERTENSION: Primary | ICD-10-CM

## 2019-04-11 PROCEDURE — 1101F PT FALLS ASSESS-DOCD LE1/YR: CPT | Mod: CPTII,S$GLB,, | Performed by: INTERNAL MEDICINE

## 2019-04-11 PROCEDURE — 3074F PR MOST RECENT SYSTOLIC BLOOD PRESSURE < 130 MM HG: ICD-10-PCS | Mod: CPTII,S$GLB,, | Performed by: INTERNAL MEDICINE

## 2019-04-11 PROCEDURE — 3074F SYST BP LT 130 MM HG: CPT | Mod: CPTII,S$GLB,, | Performed by: INTERNAL MEDICINE

## 2019-04-11 PROCEDURE — 93000 ELECTROCARDIOGRAM COMPLETE: CPT | Mod: S$GLB,,, | Performed by: INTERNAL MEDICINE

## 2019-04-11 PROCEDURE — 99999 PR PBB SHADOW E&M-EST. PATIENT-LVL III: CPT | Mod: PBBFAC,,, | Performed by: INTERNAL MEDICINE

## 2019-04-11 PROCEDURE — 99214 OFFICE O/P EST MOD 30 MIN: CPT | Mod: S$GLB,,, | Performed by: INTERNAL MEDICINE

## 2019-04-11 PROCEDURE — 99214 PR OFFICE/OUTPT VISIT, EST, LEVL IV, 30-39 MIN: ICD-10-PCS | Mod: S$GLB,,, | Performed by: INTERNAL MEDICINE

## 2019-04-11 PROCEDURE — 99999 PR PBB SHADOW E&M-EST. PATIENT-LVL III: ICD-10-PCS | Mod: PBBFAC,,, | Performed by: INTERNAL MEDICINE

## 2019-04-11 PROCEDURE — 3079F DIAST BP 80-89 MM HG: CPT | Mod: CPTII,S$GLB,, | Performed by: INTERNAL MEDICINE

## 2019-04-11 PROCEDURE — 93000 EKG 12-LEAD: ICD-10-PCS | Mod: S$GLB,,, | Performed by: INTERNAL MEDICINE

## 2019-04-11 PROCEDURE — 1101F PR PT FALLS ASSESS DOC 0-1 FALLS W/OUT INJ PAST YR: ICD-10-PCS | Mod: CPTII,S$GLB,, | Performed by: INTERNAL MEDICINE

## 2019-04-11 PROCEDURE — 3079F PR MOST RECENT DIASTOLIC BLOOD PRESSURE 80-89 MM HG: ICD-10-PCS | Mod: CPTII,S$GLB,, | Performed by: INTERNAL MEDICINE

## 2019-04-11 NOTE — PROGRESS NOTES
Subjective:      Patient ID: Autumn Harris is a 73 y.o. female.    Chief Complaint: Shortness of Breath    HPI:  More short of breath of late.    Pt felt short of breath when bringing in groceries yesterday associated with weakness.    Review of Systems   Cardiovascular: Positive for dyspnea on exertion and irregular heartbeat. Negative for chest pain, claudication, leg swelling, near-syncope, orthopnea, palpitations and syncope.      Holding pantoprazole since GERD is not an issue    Feeling weak overall.    Quit cutting grass.    Pt had labs done recently for Dr Vega which were OK.    Takes meclizine rarely for dizziness.      Past Medical History:   Diagnosis Date    Anxiety     COPD (chronic obstructive pulmonary disease)     Depression     Fibromyalgia     High cholesterol     Hypertension     Oral cancer     Urinary tract infection         Past Surgical History:   Procedure Laterality Date    ABDOMINAL SURGERY      [facility]    ESOPHAGOGASTRODUODENOSCOPY (EGD) N/A 10/17/2017    Performed by [facility] at [facility]    HYSTERECTOMY      OOPHORECTOMY      only one removed    Stomach Ulcer Surgery      ULTRASOUND-ENDOSCOPIC-UPPER Left 10/17/2017    Performed by [facility] MD at [facility]       No family history on file.    Social History     Socioeconomic History    Marital status:      Spouse name: Not on file    Number of children: Not on file    Years of education: Not on file    Highest education level: Not on file   Occupational History    Not on file   Social Needs    Financial resource strain: Not on file    Food insecurity:     Worry: Not on file     Inability: Not on file    Transportation needs:     Medical: Not on file     Non-medical: Not on file   Tobacco Use    Smoking status: Former Smoker    Smokeless tobacco: Former User     Quit date: 1/1/1995   Substance and Sexual Activity    Alcohol use: No    Drug use: Not on file    Sexual  activity: Not on file   Lifestyle    Physical activity:     Days per week: Not on file     Minutes per session: Not on file    Stress: Not on file   Relationships    Social connections:     Talks on phone: Not on file     Gets together: Not on file     Attends Zoroastrianism service: Not on file     Active member of club or organization: Not on file     Attends meetings of clubs or organizations: Not on file     Relationship status: Not on file   Other Topics Concern    Not on file   Social History Narrative    Not on file       Current Outpatient Medications on File Prior to Visit   Medication Sig Dispense Refill    azelastine (ASTELIN) 137 mcg (0.1 %) nasal spray 2 SPRAY(S) INTO EACH NOSTRIL TWICE A DAY  5    busPIRone (BUSPAR) 5 MG Tab Take 5 mg by mouth 2 (two) times daily.      clorazepate (TRANXENE) 3.75 MG Tab Take 7.5 mg by mouth 2 (two) times daily.       ESTRACE 0.01 % (0.1 mg/gram) vaginal cream       fluticasone (FLONASE) 50 mcg/actuation nasal spray 2 SPRAYS DAILY PER NOSTRIL  0    lisinopril 10 MG tablet Take 1 tablet (10 mg total) by mouth once daily. 90 tablet 3    meclizine (ANTIVERT) 12.5 mg tablet as needed.       montelukast (SINGULAIR) 10 mg tablet Take 10 mg by mouth once daily.  11    polyethylene glycol (GLYCOLAX) 17 gram/dose powder MIX 17 G WITH LIQUID EVERY DAY BY ORAL ROUTE FOR 30 DAYS  5    tiZANidine (ZANAFLEX) 4 MG tablet 8 mg every evening.      traMADol (ULTRAM) 50 mg tablet Take 50 mg by mouth every 8 (eight) hours as needed for Pain.      VENTOLIN HFA 90 mcg/actuation inhaler Inhale 2 puffs into the lungs as needed.       zolpidem (AMBIEN) 10 mg Tab Take 5 mg by mouth nightly as needed.      [DISCONTINUED] dicyclomine (BENTYL) 10 MG capsule 3 (three) times daily.      [DISCONTINUED] pantoprazole (PROTONIX) 40 MG tablet Take 40 mg by mouth once daily.       No current facility-administered medications on file prior to visit.        Review of patient's allergies  "indicates:   Allergen Reactions    Pcn [penicillins]     Sulfa (sulfonamide antibiotics)      Objective:     Vitals:    04/11/19 1408   BP: 128/85   BP Location: Right arm   Patient Position: Sitting   BP Method: Large (Automatic)   Pulse: 66   Weight: 63.2 kg (139 lb 5.3 oz)   Height: 5' 5" (1.651 m)        Physical Exam   Constitutional: She is oriented to person, place, and time. She appears well-developed and well-nourished.   Eyes: No scleral icterus.   Neck: No JVD present. Carotid bruit is not present.   Cardiovascular: Normal rate and regular rhythm. Exam reveals no gallop.   No murmur heard.  Pulmonary/Chest: Breath sounds normal.   Musculoskeletal: She exhibits no edema.   Neurological: She is alert and oriented to person, place, and time.   Skin: Skin is warm and dry.   Psychiatric: She has a normal mood and affect. Her behavior is normal. Judgment and thought content normal.   Vitals reviewed.     ECG: NSR, possible anteroseptal infarct, unchanged  Assessment:     1. Essential hypertension    2. Nonspecific abnormal electrocardiogram (ECG) (EKG)    3. Premature atrial contractions    4. Aortic valve insufficiency, etiology of cardiac valve disease unspecified    5. Simple chronic bronchitis    6. Dyspnea on exertion    7. Gastroesophageal reflux disease without esophagitis      Plan:   Autumn was seen today for shortness of breath.    Diagnoses and all orders for this visit:    Essential hypertension  -     IN OFFICE EKG 12-LEAD (to Muse)    Nonspecific abnormal electrocardiogram (ECG) (EKG)  -     IN OFFICE EKG 12-LEAD (to Muse)  -     Transthoracic echo (TTE) 2D with Color Flow; Future    Premature atrial contractions    Aortic valve insufficiency, etiology of cardiac valve disease unspecified    Simple chronic bronchitis    Dyspnea on exertion  -     IN OFFICE EKG 12-LEAD (to Muse)  -     Transthoracic echo (TTE) 2D with Color Flow; Future    Gastroesophageal reflux disease without esophagitis     " Same meds    Repeat CXR and echocardiogram due to worse shortness of breath    No follow-ups on file.

## 2019-05-29 RX ORDER — LISINOPRIL 10 MG/1
10 TABLET ORAL DAILY
Qty: 90 TABLET | Refills: 3 | Status: SHIPPED | OUTPATIENT
Start: 2019-05-29 | End: 2019-10-17 | Stop reason: SINTOL

## 2019-07-02 ENCOUNTER — HOSPITAL ENCOUNTER (OUTPATIENT)
Facility: HOSPITAL | Age: 74
Discharge: HOME | End: 2019-07-03
Attending: INTERNAL MEDICINE | Admitting: INTERNAL MEDICINE
Payer: MEDICARE

## 2019-07-02 DIAGNOSIS — J44.1 COPD EXACERBATION: ICD-10-CM

## 2019-07-02 DIAGNOSIS — E87.29 HIGH ANION GAP METABOLIC ACIDOSIS: ICD-10-CM

## 2019-07-02 DIAGNOSIS — J18.9 PNEUMONIA OF RIGHT LOWER LOBE DUE TO INFECTIOUS ORGANISM: ICD-10-CM

## 2019-07-02 DIAGNOSIS — J02.9 SORE THROAT: ICD-10-CM

## 2019-07-02 DIAGNOSIS — R06.02 SHORTNESS OF BREATH: ICD-10-CM

## 2019-07-02 DIAGNOSIS — R06.02 SOB (SHORTNESS OF BREATH): Primary | ICD-10-CM

## 2019-07-02 DIAGNOSIS — F41.9 ANXIETY: ICD-10-CM

## 2019-07-02 DIAGNOSIS — I10 ESSENTIAL HYPERTENSION: ICD-10-CM

## 2019-07-02 PROBLEM — Z85.89 HISTORY OF SQUAMOUS CELL CARCINOMA: Status: ACTIVE | Noted: 2019-07-02

## 2019-07-02 LAB
ALBUMIN SERPL BCP-MCNC: 4.1 G/DL (ref 3.5–5.2)
ALP SERPL-CCNC: 114 U/L (ref 55–135)
ALT SERPL W/O P-5'-P-CCNC: 15 U/L (ref 10–44)
ANION GAP SERPL CALC-SCNC: 14 MMOL/L (ref 8–16)
AST SERPL-CCNC: 24 U/L (ref 10–40)
BASOPHILS # BLD AUTO: 0.08 K/UL (ref 0–0.2)
BASOPHILS NFR BLD: 1.1 % (ref 0–1.9)
BILIRUB SERPL-MCNC: 0.6 MG/DL (ref 0.1–1)
BNP SERPL-MCNC: 46 PG/ML (ref 0–99)
BUN SERPL-MCNC: 6 MG/DL (ref 8–23)
CALCIUM SERPL-MCNC: 10.4 MG/DL (ref 8.7–10.5)
CHLORIDE SERPL-SCNC: 108 MMOL/L (ref 95–110)
CO2 SERPL-SCNC: 17 MMOL/L (ref 23–29)
CREAT SERPL-MCNC: 0.8 MG/DL (ref 0.5–1.4)
DIFFERENTIAL METHOD: NORMAL
EOSINOPHIL # BLD AUTO: 0.3 K/UL (ref 0–0.5)
EOSINOPHIL NFR BLD: 3.6 % (ref 0–8)
ERYTHROCYTE [DISTWIDTH] IN BLOOD BY AUTOMATED COUNT: 14.3 % (ref 11.5–14.5)
EST. GFR  (AFRICAN AMERICAN): >60 ML/MIN/1.73 M^2
EST. GFR  (NON AFRICAN AMERICAN): >60 ML/MIN/1.73 M^2
GLUCOSE SERPL-MCNC: 114 MG/DL (ref 70–110)
HCT VFR BLD AUTO: 40.2 % (ref 37–48.5)
HGB BLD-MCNC: 12.9 G/DL (ref 12–16)
LACTATE SERPL-SCNC: 3.8 MMOL/L (ref 0.5–2.2)
LACTATE SERPL-SCNC: 8 MMOL/L (ref 0.5–2.2)
LYMPHOCYTES # BLD AUTO: 2.5 K/UL (ref 1–4.8)
LYMPHOCYTES NFR BLD: 33.7 % (ref 18–48)
MCH RBC QN AUTO: 27.3 PG (ref 27–31)
MCHC RBC AUTO-ENTMCNC: 32.1 G/DL (ref 32–36)
MCV RBC AUTO: 85 FL (ref 82–98)
MONOCYTES # BLD AUTO: 1 K/UL (ref 0.3–1)
MONOCYTES NFR BLD: 12.8 % (ref 4–15)
NEUTROPHILS # BLD AUTO: 3.6 K/UL (ref 1.8–7.7)
NEUTROPHILS NFR BLD: 48.8 % (ref 38–73)
PLATELET # BLD AUTO: 236 K/UL (ref 150–350)
PMV BLD AUTO: 10.9 FL (ref 9.2–12.9)
POTASSIUM SERPL-SCNC: 3.9 MMOL/L (ref 3.5–5.1)
PROCALCITONIN SERPL IA-MCNC: 0.04 NG/ML
PROT SERPL-MCNC: 7.6 G/DL (ref 6–8.4)
RBC # BLD AUTO: 4.73 M/UL (ref 4–5.4)
SODIUM SERPL-SCNC: 139 MMOL/L (ref 136–145)
TROPONIN I SERPL DL<=0.01 NG/ML-MCNC: <0.006 NG/ML (ref 0–0.03)
WBC # BLD AUTO: 7.41 K/UL (ref 3.9–12.7)

## 2019-07-02 PROCEDURE — 25000003 PHARM REV CODE 250: Performed by: STUDENT IN AN ORGANIZED HEALTH CARE EDUCATION/TRAINING PROGRAM

## 2019-07-02 PROCEDURE — 85025 COMPLETE CBC W/AUTO DIFF WBC: CPT

## 2019-07-02 PROCEDURE — 96366 THER/PROPH/DIAG IV INF ADDON: CPT

## 2019-07-02 PROCEDURE — 83880 ASSAY OF NATRIURETIC PEPTIDE: CPT

## 2019-07-02 PROCEDURE — 99285 EMERGENCY DEPT VISIT HI MDM: CPT | Mod: 25

## 2019-07-02 PROCEDURE — 84484 ASSAY OF TROPONIN QUANT: CPT

## 2019-07-02 PROCEDURE — 96375 TX/PRO/DX INJ NEW DRUG ADDON: CPT

## 2019-07-02 PROCEDURE — 25000003 PHARM REV CODE 250: Performed by: NURSE PRACTITIONER

## 2019-07-02 PROCEDURE — 96361 HYDRATE IV INFUSION ADD-ON: CPT

## 2019-07-02 PROCEDURE — 63600175 PHARM REV CODE 636 W HCPCS: Performed by: NURSE PRACTITIONER

## 2019-07-02 PROCEDURE — G0378 HOSPITAL OBSERVATION PER HR: HCPCS

## 2019-07-02 PROCEDURE — 84145 PROCALCITONIN (PCT): CPT

## 2019-07-02 PROCEDURE — 96365 THER/PROPH/DIAG IV INF INIT: CPT

## 2019-07-02 PROCEDURE — 25000242 PHARM REV CODE 250 ALT 637 W/ HCPCS: Performed by: NURSE PRACTITIONER

## 2019-07-02 PROCEDURE — 83605 ASSAY OF LACTIC ACID: CPT | Mod: 91

## 2019-07-02 PROCEDURE — 80053 COMPREHEN METABOLIC PANEL: CPT

## 2019-07-02 PROCEDURE — 93010 ELECTROCARDIOGRAM REPORT: CPT | Mod: ,,, | Performed by: STUDENT IN AN ORGANIZED HEALTH CARE EDUCATION/TRAINING PROGRAM

## 2019-07-02 PROCEDURE — 94640 AIRWAY INHALATION TREATMENT: CPT

## 2019-07-02 PROCEDURE — 36415 COLL VENOUS BLD VENIPUNCTURE: CPT

## 2019-07-02 PROCEDURE — 93005 ELECTROCARDIOGRAM TRACING: CPT

## 2019-07-02 PROCEDURE — 93010 EKG 12-LEAD: ICD-10-PCS | Mod: ,,, | Performed by: STUDENT IN AN ORGANIZED HEALTH CARE EDUCATION/TRAINING PROGRAM

## 2019-07-02 RX ORDER — LISINOPRIL 10 MG/1
10 TABLET ORAL DAILY
Status: DISCONTINUED | OUTPATIENT
Start: 2019-07-03 | End: 2019-07-03 | Stop reason: HOSPADM

## 2019-07-02 RX ORDER — BUSPIRONE HYDROCHLORIDE 5 MG/1
5 TABLET ORAL 2 TIMES DAILY
Status: DISCONTINUED | OUTPATIENT
Start: 2019-07-02 | End: 2019-07-03 | Stop reason: HOSPADM

## 2019-07-02 RX ORDER — ACETAMINOPHEN 325 MG/1
650 TABLET ORAL
Status: COMPLETED | OUTPATIENT
Start: 2019-07-02 | End: 2019-07-02

## 2019-07-02 RX ORDER — BENZONATATE 100 MG/1
100 CAPSULE ORAL 3 TIMES DAILY PRN
Status: DISCONTINUED | OUTPATIENT
Start: 2019-07-02 | End: 2019-07-03 | Stop reason: HOSPADM

## 2019-07-02 RX ORDER — PANTOPRAZOLE SODIUM 40 MG/1
40 TABLET, DELAYED RELEASE ORAL DAILY
Status: DISCONTINUED | OUTPATIENT
Start: 2019-07-03 | End: 2019-07-03 | Stop reason: HOSPADM

## 2019-07-02 RX ORDER — ENOXAPARIN SODIUM 100 MG/ML
40 INJECTION SUBCUTANEOUS DAILY
Status: DISCONTINUED | OUTPATIENT
Start: 2019-07-03 | End: 2019-07-03 | Stop reason: HOSPADM

## 2019-07-02 RX ORDER — SODIUM CHLORIDE, SODIUM LACTATE, POTASSIUM CHLORIDE, CALCIUM CHLORIDE 600; 310; 30; 20 MG/100ML; MG/100ML; MG/100ML; MG/100ML
INJECTION, SOLUTION INTRAVENOUS CONTINUOUS
Status: DISCONTINUED | OUTPATIENT
Start: 2019-07-02 | End: 2019-07-02

## 2019-07-02 RX ORDER — PANTOPRAZOLE SODIUM 40 MG/1
40 TABLET, DELAYED RELEASE ORAL DAILY
COMMUNITY
End: 2020-07-30

## 2019-07-02 RX ORDER — LORAZEPAM 0.5 MG/1
0.5 TABLET ORAL
Status: COMPLETED | OUTPATIENT
Start: 2019-07-02 | End: 2019-07-02

## 2019-07-02 RX ORDER — MONTELUKAST SODIUM 10 MG/1
10 TABLET ORAL DAILY
Status: DISCONTINUED | OUTPATIENT
Start: 2019-07-03 | End: 2019-07-03 | Stop reason: HOSPADM

## 2019-07-02 RX ORDER — CLORAZEPATE DIPOTASSIUM 3.75 MG/1
3.75 TABLET ORAL 2 TIMES DAILY
Status: DISCONTINUED | OUTPATIENT
Start: 2019-07-02 | End: 2019-07-03 | Stop reason: HOSPADM

## 2019-07-02 RX ORDER — IPRATROPIUM BROMIDE AND ALBUTEROL SULFATE 2.5; .5 MG/3ML; MG/3ML
3 SOLUTION RESPIRATORY (INHALATION) EVERY 4 HOURS PRN
Status: DISCONTINUED | OUTPATIENT
Start: 2019-07-02 | End: 2019-07-03 | Stop reason: HOSPADM

## 2019-07-02 RX ORDER — SODIUM CHLORIDE 9 MG/ML
1000 INJECTION, SOLUTION INTRAVENOUS
Status: COMPLETED | OUTPATIENT
Start: 2019-07-02 | End: 2019-07-02

## 2019-07-02 RX ORDER — IPRATROPIUM BROMIDE AND ALBUTEROL SULFATE 2.5; .5 MG/3ML; MG/3ML
3 SOLUTION RESPIRATORY (INHALATION)
Status: COMPLETED | OUTPATIENT
Start: 2019-07-02 | End: 2019-07-02

## 2019-07-02 RX ORDER — LEVOFLOXACIN 5 MG/ML
750 INJECTION, SOLUTION INTRAVENOUS
Status: COMPLETED | OUTPATIENT
Start: 2019-07-02 | End: 2019-07-02

## 2019-07-02 RX ORDER — ENOXAPARIN SODIUM 100 MG/ML
40 INJECTION SUBCUTANEOUS EVERY 24 HOURS
Status: DISCONTINUED | OUTPATIENT
Start: 2019-07-03 | End: 2019-07-02

## 2019-07-02 RX ORDER — SODIUM CHLORIDE 9 MG/ML
INJECTION, SOLUTION INTRAVENOUS CONTINUOUS
Status: ACTIVE | OUTPATIENT
Start: 2019-07-02 | End: 2019-07-03

## 2019-07-02 RX ORDER — TRAMADOL HYDROCHLORIDE AND ACETAMINOPHEN 37.5; 325 MG/1; MG/1
1 TABLET, FILM COATED ORAL ONCE
Status: COMPLETED | OUTPATIENT
Start: 2019-07-02 | End: 2019-07-02

## 2019-07-02 RX ORDER — METHYLPREDNISOLONE SOD SUCC 125 MG
125 VIAL (EA) INJECTION
Status: COMPLETED | OUTPATIENT
Start: 2019-07-02 | End: 2019-07-02

## 2019-07-02 RX ORDER — LEVOFLOXACIN 5 MG/ML
750 INJECTION, SOLUTION INTRAVENOUS
Status: DISCONTINUED | OUTPATIENT
Start: 2019-07-03 | End: 2019-07-02

## 2019-07-02 RX ADMIN — TRAMADOL HYDROCHLORIDE AND ACETAMINOPHEN 1 TABLET: 37.5; 325 TABLET ORAL at 09:07

## 2019-07-02 RX ADMIN — SODIUM CHLORIDE, SODIUM LACTATE, POTASSIUM CHLORIDE, AND CALCIUM CHLORIDE 1000 ML: .6; .31; .03; .02 INJECTION, SOLUTION INTRAVENOUS at 07:07

## 2019-07-02 RX ADMIN — SODIUM CHLORIDE 1000 ML: 0.9 INJECTION, SOLUTION INTRAVENOUS at 03:07

## 2019-07-02 RX ADMIN — IPRATROPIUM BROMIDE AND ALBUTEROL SULFATE 3 ML: .5; 3 SOLUTION RESPIRATORY (INHALATION) at 05:07

## 2019-07-02 RX ADMIN — CLORAZEPATE DIPOTASSIUM 3.75 MG: 3.75 TABLET ORAL at 09:07

## 2019-07-02 RX ADMIN — IPRATROPIUM BROMIDE AND ALBUTEROL SULFATE 3 ML: .5; 3 SOLUTION RESPIRATORY (INHALATION) at 02:07

## 2019-07-02 RX ADMIN — LEVOFLOXACIN 750 MG: 750 INJECTION, SOLUTION INTRAVENOUS at 03:07

## 2019-07-02 RX ADMIN — METHYLPREDNISOLONE SODIUM SUCCINATE 125 MG: 125 INJECTION, POWDER, FOR SOLUTION INTRAMUSCULAR; INTRAVENOUS at 03:07

## 2019-07-02 RX ADMIN — SODIUM CHLORIDE, SODIUM LACTATE, POTASSIUM CHLORIDE, AND CALCIUM CHLORIDE: .6; .31; .03; .02 INJECTION, SOLUTION INTRAVENOUS at 09:07

## 2019-07-02 RX ADMIN — LORAZEPAM 0.5 MG: 0.5 TABLET ORAL at 03:07

## 2019-07-02 RX ADMIN — ACETAMINOPHEN 650 MG: 325 TABLET ORAL at 05:07

## 2019-07-02 RX ADMIN — BENZONATATE 100 MG: 100 CAPSULE ORAL at 11:07

## 2019-07-02 RX ADMIN — BUSPIRONE HYDROCHLORIDE 5 MG: 5 TABLET ORAL at 09:07

## 2019-07-02 RX ADMIN — BENZONATATE 100 MG: 100 CAPSULE ORAL at 05:07

## 2019-07-02 RX ADMIN — SODIUM CHLORIDE: 0.9 INJECTION, SOLUTION INTRAVENOUS at 10:07

## 2019-07-02 NOTE — H&P
"[facility] Medicine H&P Note     Admitting Team: [facility] Hospitalist Team B  Attending Physician: Mejia Conley MD  Resident: Phani Bourgeois  Intern:  Osmel Joyce    Date of Admit: 7/2/2019    Chief Complaint     Cough + Shortness of breath    Subjective:      History of Present Illness:  Autumn Harris is a 74 y.o. female with a pmh of Anxiety, COPD, and htn who presented to [facility] on 7/2/2019 with a primary complaint of Cough. She has had a productive cough of "thick green sputum" that has been getting worse. She has associated SOB, Nausea, chest pain, sore throat, decreased appetite and headaches. She denies fevers/chills/vomit/rhinorrhea/leg swelling. She is a former smoker quit in 1995 (~60pack years). She denies travel or any sick contacts. She has not had her flu shot this year.    She states that she saw her PCP over a monthago for a similar problem as today x 2 weeks duration and was prescribed Zithromax and codeine cough syrup, which has helped. She finished her medications 2 weeks ago and has now had the symptoms return.    .    Past Medical History:  Past Medical History:   Diagnosis Date    Anxiety     COPD (chronic obstructive pulmonary disease)     Depression     Fibromyalgia     High cholesterol     Hypertension     Oral cancer     Urinary tract infection        Past Surgical History:  Past Surgical History:   Procedure Laterality Date    ABDOMINAL SURGERY      [place]    ESOPHAGOGASTRODUODENOSCOPY (EGD) N/A 10/17/2017    Performed by [facility] at [facility] ENDO    HYSTERECTOMY      OOPHORECTOMY      only one removed    Stomach Ulcer Surgery      ULTRASOUND-ENDOSCOPIC-UPPER Left 10/17/2017    Performed by Naman Celaya MD at [facility] ENDO       Allergies:  Review of patient's allergies indicates:   Allergen Reactions    Pcn [penicillins]     Sulfa (sulfonamide antibiotics)        Home Medications:  Prior to Admission medications    Medication Sig " Start Date End Date Taking? Authorizing Provider   busPIRone (BUSPAR) 5 MG Tab Take 5 mg by mouth 2 (two) times daily.   Yes Historical Provider, MD   clorazepate (TRANXENE) 3.75 MG Tab Take 7.5 mg by mouth 2 (two) times daily.    Yes Historical Provider, MD   lisinopril 10 MG tablet TAKE 1 TABLET (10 MG TOTAL) BY MOUTH ONCE DAILY. 5/29/19 5/28/20 Yes Arjun Horn MD   montelukast (SINGULAIR) 10 mg tablet Take 10 mg by mouth once daily. 2/13/17  Yes Historical Provider, MD   polyethylene glycol (GLYCOLAX) 17 gram/dose powder MIX 17 G WITH LIQUID EVERY DAY BY ORAL ROUTE FOR 30 DAYS 10/30/18  Yes Historical Provider, MD   traMADol (ULTRAM) 50 mg tablet Take 50 mg by mouth every 8 (eight) hours as needed for Pain.   Yes Historical Provider, MD   VENTOLIN HFA 90 mcg/actuation inhaler Inhale 2 puffs into the lungs as needed.  8/4/17  Yes Historical Provider, MD   zolpidem (AMBIEN) 10 mg Tab Take 5 mg by mouth nightly as needed.   Yes Historical Provider, MD   azelastine (ASTELIN) 137 mcg (0.1 %) nasal spray 2 SPRAY(S) INTO EACH NOSTRIL TWICE A DAY 2/5/17   Historical Provider, MD   ESTRACE 0.01 % (0.1 mg/gram) vaginal cream  10/22/17   Historical Provider, MD   fluticasone (FLONASE) 50 mcg/actuation nasal spray 2 SPRAYS DAILY PER NOSTRIL 8/19/16   Historical Provider, MD   meclizine (ANTIVERT) 12.5 mg tablet as needed.  6/12/17   Historical Provider, MD   tiZANidine (ZANAFLEX) 4 MG tablet 8 mg every evening. 3/3/18 7/2/19  Historical Provider, MD       Family History:  Family History   Problem Relation Age of Onset    Heart disease Mother     Pacemaker/defibrilator Brother     Heart disease Brother     Heart disease Maternal Aunt     Lung cancer Brother        Social History:  Social History     Tobacco Use    Smoking status: Former Smoker     Packs/day: 1.50     Years: 45.00     Pack years: 67.50    Smokeless tobacco: Former User     Quit date: 1/1/1995    Tobacco comment: Quit when diagnosed with  "squamous cell carcinoma   Substance Use Topics    Alcohol use: No    Drug use: Not on file       Review of Systems:  Pertinent items are noted in HPI. All other systems are reviewed and are negative.    Health Maintaince :   Primary Care Physician: Tonya Vega MD      Immunizations:   Flu not taken this year    Pna- patient states she had it in the past.       Objective:   Last 24 Hour Vital Signs:  BP  Min: 136/70  Max: 183/90  Temp  Av.9 °F (36.6 °C)  Min: 97.8 °F (36.6 °C)  Max: 97.9 °F (36.6 °C)  Pulse  Av.1  Min: 67  Max: 114  Resp  Av.8  Min: 20  Max: 45  SpO2  Av %  Min: 96 %  Max: 100 %  Height  Av' 5" (165.1 cm)  Min: 5' 5" (165.1 cm)  Max: 5' 5" (165.1 cm)  Weight  Av.5 kg (140 lb)  Min: 63.5 kg (140 lb)  Max: 63.5 kg (140 lb)  Body mass index is 23.3 kg/m².  No intake/output data recorded.    Physical Examination:    BP (!) 177/81   Pulse (!) 118   Temp 97.9 °F (36.6 °C) (Oral)   Resp (!) 31   Ht 5' 5" (1.651 m)   Wt 63.5 kg (140 lb)   LMP  (LMP Unknown)   SpO2 98%   BMI 23.30 kg/m²     General Appearance:    Alert, cooperative, anxious, appears stated age   Head:    Normocephalic, without obvious abnormality, atraumatic   Eyes:    PERRL, conjunctiva/corneas clear, EOM's intact, fundi     benign, both eyes   Ears:    Normal TM's and external ear canals, both ears   Nose:   Nares normal, septum midline, mucosa normal, no drainage    or sinus tenderness. Patient states previous surgery (possibly MOHS).   Throat:   Lips, mucosa, and tongue normal; teeth and gums normal   Neck:   Supple, symmetrical, trachea midline, no adenopathy;     thyroid:  no enlargement/tenderness/nodules; no carotid    bruit or JVD   Back:     Symmetric, no curvature, ROM normal, no CVA tenderness   Lungs:     Clear to auscultation bilaterally, increased rate of breathing with constant coughing   Chest Wall:    No tenderness or deformity    Heart:    Tachycardic, no murmur, rub   or gallop "       Abdomen:     Soft, non-tender, bowel sounds active all four quadrants,     no masses, no organomegaly           Extremities:   Extremities normal, atraumatic, no cyanosis or edema   Pulses:   2+ and symmetric all extremities   Skin:   Skin color, texture, turgor normal, no rashes or lesions   Lymph nodes:   Cervical, supraclavicular, and axillary nodes normal   Neurologic:   CNII-XII intact, normal strength, sensation and reflexes     throughout         Laboratory:  Most Recent Data:  CBC:   Lab Results   Component Value Date    WBC 7.41 07/02/2019    HGB 12.9 07/02/2019    HCT 40.2 07/02/2019     07/02/2019    MCV 85 07/02/2019    RDW 14.3 07/02/2019     BMP:   Lab Results   Component Value Date     07/02/2019    K 3.9 07/02/2019     07/02/2019    CO2 17 (L) 07/02/2019    BUN 6 (L) 07/02/2019    CREATININE 0.8 07/02/2019     (H) 07/02/2019    CALCIUM 10.4 07/02/2019    MG 1.7 10/13/2017     LFTs:   Lab Results   Component Value Date    PROT 7.6 07/02/2019    ALBUMIN 4.1 07/02/2019    BILITOT 0.6 07/02/2019    AST 24 07/02/2019    ALKPHOS 114 07/02/2019    ALT 15 07/02/2019     Coags:   Lab Results   Component Value Date    INR 1.0 10/13/2017     FLP:   Lab Results   Component Value Date    CHOL 172 10/13/2017    HDL 47 10/13/2017    LDLCALC 112.2 10/13/2017    TRIG 64 10/13/2017    CHOLHDL 27.3 10/13/2017     DM:   Lab Results   Component Value Date    HGBA1C 5.5 10/13/2017    LDLCALC 112.2 10/13/2017    CREATININE 0.8 07/02/2019     Thyroid:   Lab Results   Component Value Date    TSH 0.835 10/13/2017     Anemia: No results found for: IRON, TIBC, FERRITIN, [id number], FOLATE  Cardiac:   Lab Results   Component Value Date    TROPONINI <0.006 07/02/2019    BNP 46 07/02/2019     Urinalysis:   Lab Results   Component Value Date    LABURIN CITROBACTER FREUNDII  >100,000 cfu/ml   11/07/2017    COLORU Yellow 11/07/2017    SPECGRAV 1.005 11/07/2017    NITRITE Positive (A) 11/07/2017     KETONESU Negative 11/07/2017    UROBILINOGEN Negative 11/07/2017    WBCUA >100 (H) 11/07/2017       Trended Lab Data:  Recent Labs   Lab 07/02/19  1509   WBC 7.41   HGB 12.9   HCT 40.2      MCV 85   RDW 14.3      K 3.9      CO2 17*   BUN 6*   CREATININE 0.8   *   PROT 7.6   ALBUMIN 4.1   BILITOT 0.6   AST 24   ALKPHOS 114   ALT 15       Trended Cardiac Data:  Recent Labs   Lab 07/02/19  1509   TROPONINI <0.006   BNP 46         Other Results:  EKG (my interpretation):T wave amplitude has decreased in Inferior leads  Nonspecific T wave abnormality, worse in Lateral leads    Radiology:  Imaging Results          X-Ray Chest 1 View (Final result)  Result time 07/02/19 15:00:41    Final result by Son Costa MD (07/02/19 15:00:41)                 Impression:      1. Subtle subsegmental area of increased attenuation projecting over the lateral aspect of the right lower lobe, possibly atelectasis or developing consolidation.  Consider follow-up radiograph in 6 weeks.      Electronically signed by: Son Costa MD  Date:    07/02/2019  Time:    15:00             Narrative:    EXAMINATION:  XR CHEST 1 VIEW    CLINICAL HISTORY:  Shortness of breath    TECHNIQUE:  Single frontal view of the chest was performed.    COMPARISON:  CTA 10/12/2017.  Radiograph 10/12/2017.    FINDINGS:  Cardiac monitoring leads project over the bilateral hemithoraces.  Mediastinal structures are midline.  Hilar contours are unremarkable.  Cardiac silhouette is normal in size.  Lung volumes are symmetric.  There is a subsegmental area of increased attenuation projecting over the lateral aspect of the right lower lung zone.  No pneumothorax or pleural effusions.  No free air beneath the diaphragm.  No acute osseous abnormalities.  Surgical clips project over the upper midline abdomen.                                     Assessment:     Autumn Harris is a 74 y.o. female who presents for cough and shortness of  breath.     Plan:     Shortness of breath 2/2 suspected pneumonia vs viral URI  - Patient presents with 6 week hx of SOB and dry cough, progressed to productive x 1 week, worsening SOB x 1 day. Previously treated with Azithromycin and Codeine cough medicine last month.  - On examination, patient tachcardic and coughing frequently, however, is able to speak in full sentences and does not appear to have increased work of breathing.   - patient afebrile, normal WBC  - Xrays in ED showed small RLL infiltrate  - given dose of levaquin and 2 duo-nebs in ED  - will continue levaquin for now, and checking procal.  - Continue duo-nebs q4hrs prn.  - Will get sputum culture and viral panel.     Anion gap metabolic acidosis  - possibly due to decreased PO intake. Lactic acid 3.8, will bolus fluids and recheck q4.     Hypertension  - Patient is on lisinopril 10mg.  - She is hypertensive in the ED, possibly due to anxiety, is trending downward without intervention. Will maintain home regimen and continue to monitor.    Anxiety  -Patient on Buspar and Tranxene daily.  - Continue home regimen.    GERD  - Patient has hx of gastric ulcers s/p surgical resection. On protonix. Will continue home regimen.    Hx of Cancer- SCC head and neck  - Patient with hx of head and neck cancer s/p surgical resection and treatment in 1995. No acute issues.       Diet: Low sodium  PPX: lovenox  Dispo: will monitor overnight for improvement in symptoms and lactic acidosis.    Code Status:     Full code    Osmel Joyce MD  [facility] Internal Medicine HO-I    [facility] Medicine Hospitalist Pager numbers:   [facility] Hospitalist Medicine Team A (Maximo/John): [phone]  [facility] Hospitalist Medicine Team B (Tamie/Yael):  [phone]

## 2019-07-02 NOTE — ED PROVIDER NOTES
Encounter Date: 7/2/2019       History     Chief Complaint   Patient presents with    Cough     74 year old female presents to ed cc of productive cough. patient states has had cough on/ off for past 6 weeks was given rx with no relief.      74-year-old female presents the ED for a cough.  The patient reports for the past 6 weeks.  She has been seen by her PCP for this complaint was prescribe Zithromax and a codeine cough syrup which she believes helped, but the cough has returned after completing the medications.  Pt completed the zithromax about 3 weeks ago.  She reports the cough is productive of green and yellow sputum.  She also reports shortness of breath starting today.  She reports that her chest feels tight.  She denies history of COPD and current cigarette use.  She reports that she has not been eating or drinking much due to vomiting after coughing.  The patient also reports headache which she believes is due to frequent coughing.  She denies hemoptysis.  No home oxygen use.  No other complaints at this time.    The history is provided by the patient.     Review of patient's allergies indicates:   Allergen Reactions    Pcn [penicillins]     Sulfa (sulfonamide antibiotics)      Past Medical History:   Diagnosis Date    Anxiety     COPD (chronic obstructive pulmonary disease)     Depression     Fibromyalgia     High cholesterol     Hypertension     Oral cancer     Urinary tract infection      Past Surgical History:   Procedure Laterality Date    ABDOMINAL SURGERY      [place]    ESOPHAGOGASTRODUODENOSCOPY (EGD) N/A 10/17/2017    [facility]    HYSTERECTOMY      OOPHORECTOMY      only one removed    Stomach Ulcer Surgery      ULTRASOUND-ENDOSCOPIC-UPPER Left 10/17/2017    Performed by Naman Celaya MD at [facility] ENDO     History reviewed. No pertinent family history.  Social History     Tobacco Use    Smoking status: Former Smoker    Smokeless  tobacco: Former User     Quit date: 1/1/1995   Substance Use Topics    Alcohol use: No    Drug use: Not on file     Review of Systems   Constitutional: Positive for activity change, appetite change and fatigue. Negative for chills and fever.   HENT: Positive for postnasal drip and sore throat. Negative for congestion and rhinorrhea.    Eyes: Negative for visual disturbance.   Respiratory: Positive for cough, shortness of breath and wheezing. Negative for chest tightness.    Cardiovascular: Negative for chest pain, palpitations and leg swelling.   Gastrointestinal: Negative for abdominal pain and vomiting.   Genitourinary: Negative for dysuria.   Musculoskeletal: Negative for back pain.   Skin: Negative for rash and wound.   Neurological: Positive for headaches. Negative for dizziness, weakness, light-headedness and numbness.   Psychiatric/Behavioral: Negative for confusion.       Physical Exam     Initial Vitals [07/02/19 1340]   BP Pulse Resp Temp SpO2   136/70 69 20 97.8 °F (36.6 °C) 98 %      MAP       --         Physical Exam    Nursing note and vitals reviewed.  Constitutional: Vital signs are normal. She appears well-developed and well-nourished. She is active and cooperative. She is easily aroused.  Non-toxic appearance. She does not have a sickly appearance. She appears ill. No distress.   HENT:   Head: Normocephalic and atraumatic.   Right Ear: External ear normal.   Left Ear: External ear normal.   Nose: Rhinorrhea present.   Mouth/Throat: Uvula is midline.   Post nasal drip   Eyes: Conjunctivae and EOM are normal.   Neck: Trachea normal, normal range of motion, full passive range of motion without pain and phonation normal. Neck supple. No stridor present. No tracheal tenderness present. No tracheal deviation present.   Cardiovascular: Normal rate, regular rhythm and normal heart sounds.   Pulmonary/Chest: Accessory muscle usage present. Tachypnea noted. No respiratory distress. She has decreased breath  sounds. She has wheezes (expiratory diffuse bilaterally). She has no rhonchi. She has no rales.   Abdominal: Soft. Normal appearance and bowel sounds are normal.   Musculoskeletal:   No LE edema.    Neurological: She is alert, oriented to person, place, and time and easily aroused. She has normal strength. GCS eye subscore is 4. GCS verbal subscore is 5. GCS motor subscore is 6.   Skin: Skin is warm, dry and intact. No rash noted.   Psychiatric: Her speech is normal and behavior is normal. Judgment and thought content normal. Her mood appears anxious. Cognition and memory are normal.         ED Course   Procedures  Labs Reviewed   COMPREHENSIVE METABOLIC PANEL - Abnormal; Notable for the following components:       Result Value    CO2 17 (*)     Glucose 114 (*)     BUN, Bld 6 (*)     All other components within normal limits   CBC W/ AUTO DIFFERENTIAL   TROPONIN I   B-TYPE NATRIURETIC PEPTIDE        ECG Results          EKG 12-lead (In process)  Result time 07/02/19 14:43:48    In process by Interface, Lab In [facility] (07/02/19 14:43:48)                 Narrative:    Test Reason : R06.02,    Vent. Rate : 068 BPM     Atrial Rate : 068 BPM     P-R Int : 164 ms          QRS Dur : 086 ms      QT Int : 438 ms       P-R-T Axes : 044 048 072 degrees     QTc Int : 465 ms    Normal sinus rhythm with sinus arrhythmia  Anteroseptal infarct (cited on or before 27-MAR-2018)  Abnormal ECG  When compared with ECG of 11-APR-2019 14:11,  T wave amplitude has decreased in Inferior leads  Nonspecific T wave abnormality, worse in Lateral leads    Referred By: [email or web address]   SELF           Confirmed By:                             Imaging Results          X-Ray Chest 1 View (Final result)  Result time 07/02/19 15:00:41    Final result by Son Csota MD (07/02/19 15:00:41)                 Impression:      1. Subtle subsegmental area of increased attenuation projecting over the lateral aspect of the right lower lobe, possibly  atelectasis or developing consolidation.  Consider follow-up radiograph in 6 weeks.      Electronically signed by: Son Costa MD  Date:    07/02/2019  Time:    15:00             Narrative:    EXAMINATION:  XR CHEST 1 VIEW    CLINICAL HISTORY:  Shortness of breath    TECHNIQUE:  Single frontal view of the chest was performed.    COMPARISON:  CTA 10/12/2017.  Radiograph 10/12/2017.    FINDINGS:  Cardiac monitoring leads project over the bilateral hemithoraces.  Mediastinal structures are midline.  Hilar contours are unremarkable.  Cardiac silhouette is normal in size.  Lung volumes are symmetric.  There is a subsegmental area of increased attenuation projecting over the lateral aspect of the right lower lung zone.  No pneumothorax or pleural effusions.  No free air beneath the diaphragm.  No acute osseous abnormalities.  Surgical clips project over the upper midline abdomen.                                 Medical Decision Making:   Initial Assessment:   75yo female here for cough for 6 weeks.  No resolution after abx 3 weeks ago.  Pt reports SOB starting today.  Pt appears ill but nontoxic.  Vitals stable.  98% SaO2 on RA.  She has diffuse wheezing and increased WOB.  No LE edema.   Differential Diagnosis:   Bronchitis, URI, allergies, irritants, pulmonary edema, GERD, laryngitis, tracheitis, asthma, sinusitis, pneumonia, viral, COPD, medication side effect    Clinical Tests:   Lab Tests: Ordered and Reviewed  Radiological Study: Reviewed and Ordered  Medical Tests: Reviewed and Ordered  ED Management:  Labs, CXR, EKG, IV fluids, Solumedrol, duoneb, levaquin, tylenol,   Other:   I have discussed this case with another health care provider.  The patient reports mild improvement after her DuoNeb treatment and steroids.  She still has increased work of breathing and feels short of breath despite ED interventions.  Chest x-ray reveals possible new right lower lobe infiltrate versus atelectasis.  Due to the patient's  physical exam and HPI, I will initiate antibiotics.   Levaquin given in the ED.  Pt feels SOB despite ED interventions.  I feel she will require observation and continued nebulizer treatments.  [facility] IM resident agrees to accept patient to observation. Pt agreeable to plan.                       Clinical Impression:       ICD-10-CM ICD-9-CM   1. Pneumonia of right lower lobe due to infectious organism J18.1 486   2. SOB (shortness of breath) R06.02 786.05   3. COPD exacerbation J44.1 491.21                                Adeline Latham, [facility]  07/02/19 1715

## 2019-07-02 NOTE — ED NOTES
Attempted to place IV in right wrist and left hand; IV's infiltrated. WONG Morrow at bedside placing IV

## 2019-07-02 NOTE — ED NOTES
Placed pt on 2L nasal canula due to pt work of breathing/tachypnic. Pt oxygen saturation currently at 98%

## 2019-07-02 NOTE — ED NOTES
Admit team at bedside. Notified MD that patient remains hypertensive, tachypneic and tachycardic.  Pt appears anxious and reports hx of anxiety.  Pt encouraged to attempt to relax and slow speech but has difficulty.  MD notified that 5th floor will not be able to accept the patient with her current vital signs unless parameters are written.  MD to speak with patient.  Charge Nurse and House AC notified of patient situation.  Awaiting further orders.

## 2019-07-03 VITALS
WEIGHT: 135.56 LBS | TEMPERATURE: 99 F | BODY MASS INDEX: 22.59 KG/M2 | HEIGHT: 65 IN | RESPIRATION RATE: 20 BRPM | SYSTOLIC BLOOD PRESSURE: 147 MMHG | OXYGEN SATURATION: 100 % | HEART RATE: 82 BPM | DIASTOLIC BLOOD PRESSURE: 98 MMHG

## 2019-07-03 PROBLEM — R06.02 SOB (SHORTNESS OF BREATH): Status: RESOLVED | Noted: 2019-07-02 | Resolved: 2019-07-03

## 2019-07-03 PROBLEM — E87.29 HIGH ANION GAP METABOLIC ACIDOSIS: Status: RESOLVED | Noted: 2019-07-02 | Resolved: 2019-07-03

## 2019-07-03 PROBLEM — R06.02 SHORTNESS OF BREATH: Status: RESOLVED | Noted: 2019-07-02 | Resolved: 2019-07-03

## 2019-07-03 PROBLEM — J02.9 SORE THROAT: Status: ACTIVE | Noted: 2019-07-03

## 2019-07-03 LAB
ANION GAP SERPL CALC-SCNC: 12 MMOL/L (ref 8–16)
BASOPHILS # BLD AUTO: 0 K/UL (ref 0–0.2)
BASOPHILS NFR BLD: 0 % (ref 0–1.9)
BUN SERPL-MCNC: 8 MG/DL (ref 8–23)
CALCIUM SERPL-MCNC: 10.8 MG/DL (ref 8.7–10.5)
CHLORIDE SERPL-SCNC: 110 MMOL/L (ref 95–110)
CO2 SERPL-SCNC: 19 MMOL/L (ref 23–29)
CREAT SERPL-MCNC: 0.8 MG/DL (ref 0.5–1.4)
DIFFERENTIAL METHOD: ABNORMAL
EOSINOPHIL # BLD AUTO: 0 K/UL (ref 0–0.5)
EOSINOPHIL NFR BLD: 0 % (ref 0–8)
ERYTHROCYTE [DISTWIDTH] IN BLOOD BY AUTOMATED COUNT: 14.4 % (ref 11.5–14.5)
EST. GFR  (AFRICAN AMERICAN): >60 ML/MIN/1.73 M^2
EST. GFR  (NON AFRICAN AMERICAN): >60 ML/MIN/1.73 M^2
GLUCOSE SERPL-MCNC: 130 MG/DL (ref 70–110)
HCT VFR BLD AUTO: 39 % (ref 37–48.5)
HGB BLD-MCNC: 12.7 G/DL (ref 12–16)
LACTATE SERPL-SCNC: 1.6 MMOL/L (ref 0.5–2.2)
LACTATE SERPL-SCNC: 5.1 MMOL/L (ref 0.5–2.2)
LYMPHOCYTES # BLD AUTO: 0.7 K/UL (ref 1–4.8)
LYMPHOCYTES NFR BLD: 13.9 % (ref 18–48)
MCH RBC QN AUTO: 27 PG (ref 27–31)
MCHC RBC AUTO-ENTMCNC: 32.6 G/DL (ref 32–36)
MCV RBC AUTO: 83 FL (ref 82–98)
MONOCYTES # BLD AUTO: 0.5 K/UL (ref 0.3–1)
MONOCYTES NFR BLD: 8.6 % (ref 4–15)
NEUTROPHILS # BLD AUTO: 4.1 K/UL (ref 1.8–7.7)
NEUTROPHILS NFR BLD: 77.5 % (ref 38–73)
PLATELET # BLD AUTO: 241 K/UL (ref 150–350)
PMV BLD AUTO: 10.4 FL (ref 9.2–12.9)
POTASSIUM SERPL-SCNC: 4.1 MMOL/L (ref 3.5–5.1)
RBC # BLD AUTO: 4.71 M/UL (ref 4–5.4)
SODIUM SERPL-SCNC: 141 MMOL/L (ref 136–145)
WBC # BLD AUTO: 5.34 K/UL (ref 3.9–12.7)

## 2019-07-03 PROCEDURE — 83605 ASSAY OF LACTIC ACID: CPT

## 2019-07-03 PROCEDURE — 63600175 PHARM REV CODE 636 W HCPCS: Performed by: STUDENT IN AN ORGANIZED HEALTH CARE EDUCATION/TRAINING PROGRAM

## 2019-07-03 PROCEDURE — 87070 CULTURE OTHR SPECIMN AEROBIC: CPT

## 2019-07-03 PROCEDURE — 96372 THER/PROPH/DIAG INJ SC/IM: CPT

## 2019-07-03 PROCEDURE — 80048 BASIC METABOLIC PNL TOTAL CA: CPT

## 2019-07-03 PROCEDURE — G0378 HOSPITAL OBSERVATION PER HR: HCPCS

## 2019-07-03 PROCEDURE — 87205 SMEAR GRAM STAIN: CPT

## 2019-07-03 PROCEDURE — 87632 RESP VIRUS 6-11 TARGETS: CPT

## 2019-07-03 PROCEDURE — 25000003 PHARM REV CODE 250: Performed by: STUDENT IN AN ORGANIZED HEALTH CARE EDUCATION/TRAINING PROGRAM

## 2019-07-03 PROCEDURE — 85025 COMPLETE CBC W/AUTO DIFF WBC: CPT

## 2019-07-03 PROCEDURE — 36415 COLL VENOUS BLD VENIPUNCTURE: CPT

## 2019-07-03 PROCEDURE — 80307 DRUG TEST PRSMV CHEM ANLYZR: CPT

## 2019-07-03 PROCEDURE — 94761 N-INVAS EAR/PLS OXIMETRY MLT: CPT

## 2019-07-03 PROCEDURE — 83605 ASSAY OF LACTIC ACID: CPT | Mod: 91

## 2019-07-03 RX ORDER — DOXYCYCLINE HYCLATE 100 MG
100 TABLET ORAL 2 TIMES DAILY
Qty: 14 TABLET | Refills: 0 | Status: SHIPPED | OUTPATIENT
Start: 2019-07-04 | End: 2019-07-11

## 2019-07-03 RX ORDER — LISINOPRIL 10 MG/1
10 TABLET ORAL ONCE
Status: COMPLETED | OUTPATIENT
Start: 2019-07-03 | End: 2019-07-03

## 2019-07-03 RX ORDER — ALBUTEROL SULFATE 90 UG/1
2 AEROSOL, METERED RESPIRATORY (INHALATION) EVERY 6 HOURS PRN
Qty: 18 G | Refills: 0 | Status: SHIPPED | OUTPATIENT
Start: 2019-07-03 | End: 2024-03-21

## 2019-07-03 RX ORDER — BENZONATATE 100 MG/1
100 CAPSULE ORAL 3 TIMES DAILY PRN
Qty: 20 CAPSULE | Refills: 0 | Status: SHIPPED | OUTPATIENT
Start: 2019-07-03 | End: 2019-07-13

## 2019-07-03 RX ORDER — LORAZEPAM 1 MG/1
1 TABLET ORAL ONCE
Status: COMPLETED | OUTPATIENT
Start: 2019-07-03 | End: 2019-07-03

## 2019-07-03 RX ORDER — GUAIFENESIN 600 MG/1
600 TABLET, EXTENDED RELEASE ORAL ONCE
Status: COMPLETED | OUTPATIENT
Start: 2019-07-03 | End: 2019-07-03

## 2019-07-03 RX ADMIN — BUSPIRONE HYDROCHLORIDE 5 MG: 5 TABLET ORAL at 10:07

## 2019-07-03 RX ADMIN — MONTELUKAST 10 MG: 10 TABLET, FILM COATED ORAL at 10:07

## 2019-07-03 RX ADMIN — CLORAZEPATE DIPOTASSIUM 3.75 MG: 3.75 TABLET ORAL at 10:07

## 2019-07-03 RX ADMIN — LISINOPRIL 10 MG: 10 TABLET ORAL at 10:07

## 2019-07-03 RX ADMIN — PANTOPRAZOLE SODIUM 40 MG: 40 TABLET, DELAYED RELEASE ORAL at 10:07

## 2019-07-03 RX ADMIN — LISINOPRIL 10 MG: 10 TABLET ORAL at 01:07

## 2019-07-03 RX ADMIN — GUAIFENESIN 600 MG: 600 TABLET, EXTENDED RELEASE ORAL at 03:07

## 2019-07-03 RX ADMIN — LORAZEPAM 1 MG: 1 TABLET ORAL at 03:07

## 2019-07-03 RX ADMIN — ENOXAPARIN SODIUM 40 MG: 100 INJECTION SUBCUTANEOUS at 10:07

## 2019-07-03 NOTE — DISCHARGE INSTRUCTIONS
Shortness of Breath (Dyspnea) (English) View Edit Remove  Benzonatate capsules (English) View Edit Remove  Doxycycline tablets or capsules (English) View Edit Remove

## 2019-07-03 NOTE — ED NOTES
Spoke with Dr Bourgeois, notified of patient vital signs and patient okay to go to floor.  Pt awake, alert and oriented x 4.  Skin pink/warm/dry.  Resp =/rapid but patient able to speak in complete sentences.  Talking rapidly and constantly with family member at bedside.  Pt encouraged to slow talking and try to relax

## 2019-07-03 NOTE — PLAN OF CARE
VN cued into patients room for DC instructions. Discharge teaching was reviewed with patient and family. Pt verbalized understanding of follow up appts and changes to medications. Refer to clinical references for pt education. IV removed by bedside nurse. Patient waiting for bedside delivery of meds and verbalized understanding to call  for transport.       Bedside nurse aware and contacted primary team of elevated BP - to recheck prior to leaving.    Pt is seen and examined  pt is awake and out of bed to chair  Complains  of numbness of her chin, this is a chronic complaint but she is complaining of this more.  otherwise pt seems comfortable and denies any complaints at this time  Tolerating chemotherapy well.        MEDICATIONS  (STANDING):  acyclovir   Tablet 400 milliGRAM(s) Oral <User Schedule>  allopurinol 300 milliGRAM(s) Oral daily  dextrose 5% + sodium chloride 0.45%. 1000 milliLiter(s) (50 mL/Hr) IV Continuous <Continuous>  DOXOrubicin IVPB w/vinCRIStine 16 milliGRAM(s) IV Intermittent daily  etoposide IVPB 80 milliGRAM(s) IV Intermittent daily  famotidine  IVPB 20 milliGRAM(s) IV Intermittent daily  influenza   Vaccine 0.5 milliLiter(s) IntraMuscular once  ondansetron  IVPB 16 milliGRAM(s) IV Intermittent daily  predniSONE   Tablet 50 milliGRAM(s) Oral two times a day  thiamine 100 milliGRAM(s) Oral daily  trimethoprim  160 mG/sulfamethoxazole 800 mG 1 Tablet(s) Oral <User Schedule>    MEDICATIONS  (PRN):  acetaminophen   Tablet 650 milliGRAM(s) Oral every 6 hours PRN Mild Pain (1 - 3)      Allergies    No Known Allergies    Intolerances        Vital Signs Last 24 Hrs  T(C): 36.6 (27 Oct 2017 08:27), Max: 36.7 (26 Oct 2017 21:26)  T(F): 97.8 (27 Oct 2017 08:27), Max: 98.1 (26 Oct 2017 21:26)  HR: 56 (27 Oct 2017 08:27) (56 - 73)  BP: 123/64 (27 Oct 2017 08:27) (103/63 - 123/64)  BP(mean): --  RR: 18 (27 Oct 2017 08:27) (18 - 18)  SpO2: 96% (27 Oct 2017 08:27) (96% - 98%)    PHYSICAL EXAM  General: adult in NAD  HEENT: clear oropharynx, anicteric sclera, pink conjunctiva  Neck: supple  CV: normal S1/S2 with no murmur rubs or gallops  Lungs: positive air movement b/l ant lungs ,clear to auscultation, no wheezes, no rales  Abdomen: soft non-tender non-distended, no hepatosplenomegaly  Ext: no clubbing cyanosis or edema  Skin: no rashes and no petechiae  Neuro: alert and oriented X 4, no focal deficits  LABS:                          9.7    7.32  )-----------( 194      ( 27 Oct 2017 08:33 )             28.6         Mean Cell Volume : 101.8 fl  Mean Cell Hemoglobin : 34.5 pg  Mean Cell Hemoglobin Concentration : 33.9 gm/dL  Auto Neutrophil # : 6.71 K/uL  Auto Lymphocyte # : 0.40 K/uL  Auto Monocyte # : 0.21 K/uL  Auto Eosinophil # : 0.00 K/uL  Auto Basophil # : 0.00 K/uL  Auto Neutrophil % : 91.6 %  Auto Lymphocyte % : 5.5 %  Auto Monocyte % : 2.9 %  Auto Eosinophil % : 0.0 %  Auto Basophil % : 0.0 %      10-27    142  |  107  |  22  ----------------------------<  137<H>  4.4   |  23  |  0.89    Ca    9.0      27 Oct 2017 08:42  Phos  3.3     10-27  Mg     2.0     10-27    TPro  5.5<L>  /  Alb  3.8  /  TBili  0.2  /  DBili  x   /  AST  20  /  ALT  19  /  AlkPhos  49  10-27          Lactate Dehydrogenase, Serum: 201 U/L (10-27 @ 08:42)  Lactate Dehydrogenase, Serum: 203 U/L (10-26 @ 08:32)  Lactate Dehydrogenase, Serum: 238 U/L (10-25 @ 13:21)    RADIOLOGY & ADDITIONAL STUDIES:

## 2019-07-03 NOTE — PROGRESS NOTES
Notified Dr. Lugo the patient c/o pain to her head, legs, back, neck. He said he would come up to see the patient.

## 2019-07-03 NOTE — MEDICAL/APP STUDENT
"[facility] Medicine Progress Note     Admitting Team: \[facility]\"" Hospitalist Team B  Attending Physician: Mejia Conley MD  Resident: Phani Bourgeois  Intern:Osmel Joyce    Date of Admit: 2019      Subjective:      History of Present Illness:  Patient is doing well this morning without any acute events overnight. Her shortness of breath, cough, and headache have improved. She reports only mild chest and abdominal soreness secondary to coughing. She denies fever, chills, nausea, or vomiting. She is urinating without difficulty. No bowel movement since admission.        Objective:   Last 24 Hour Vital Signs:  BP  Min: 136/70  Max: 184/86  Temp  Av.6 °F (36.4 °C)  Min: 96 °F (35.6 °C)  Max: 98 °F (36.7 °C)  Pulse  Av.2  Min: 67  Max: 118  Resp  Av  Min: 18  Max: 45  SpO2  Av.8 %  Min: 95 %  Max: 100 %  Height  Av' 5" (165.1 cm)  Min: 5' 5" (165.1 cm)  Max: 5' 5" (165.1 cm)  Weight  Av.2 kg (137 lb 0.9 oz)  Min: 61.5 kg (135 lb 9.3 oz)  Max: 63.5 kg (140 lb)  I/O last 3 completed shifts:  In: 150 [IV Piggyback:150]  Out: 1600 [Urine:1600]    Physical Examination:  General: Patient sitting up in bed awake. In NAD.  Head: Normocephalic. Atraumatic.  Eyes: No scleral icterus. No conjunctival injection.  Mouth/Oropharynx: Moist mucous membranes.  CV: regular rate and rhythm without murmurs, rubs, or gallops.  Resp: clear to auscultation bilaterally without wheezes, rales, rhonchi.   Abdomen: BSx4. Soft non-tender without guarding.   Skin/Extremeites: No edema, ecchymoses or gross abnormalities of joints appreciated.   Neuro: AAOx3. Moving all limbs appropriately.   Psych: Affect: Normal. Thought process appropriate.    Laboratory:    Trended Lab Data:  Recent Labs   Lab 19  1509 19  0534   WBC 7.41 5.34   HGB 12.9 12.7   HCT 40.2 39.0    241   MCV 85 83   RDW 14.3 14.4    141   K 3.9 4.1    110   CO2 17* 19*   BUN 6* 8   CREATININE 0.8 0.8   * 130*   PROT " 7.6  --    ALBUMIN 4.1  --    BILITOT 0.6  --    AST 24  --    ALKPHOS 114  --    ALT 15  --            Microbiology Data  Respiratory culture with gram stain: <10 epithelial cells per low power field, rare WBCs, few gram positive cocci    Other Results:    Radiology:  Imaging Results          X-Ray Chest 1 View (Final result)  Result time 07/02/19 15:00:41    Final result by Son Costa MD (07/02/19 15:00:41)                 Impression:      1. Subtle subsegmental area of increased attenuation projecting over the lateral aspect of the right lower lobe, possibly atelectasis or developing consolidation.  Consider follow-up radiograph in 6 weeks.      Electronically signed by: Son Costa MD  Date:    07/02/2019  Time:    15:00             Narrative:    EXAMINATION:  XR CHEST 1 VIEW    CLINICAL HISTORY:  Shortness of breath    TECHNIQUE:  Single frontal view of the chest was performed.    COMPARISON:  CTA 10/12/2017.  Radiograph 10/12/2017.    FINDINGS:  Cardiac monitoring leads project over the bilateral hemithoraces.  Mediastinal structures are midline.  Hilar contours are unremarkable.  Cardiac silhouette is normal in size.  Lung volumes are symmetric.  There is a subsegmental area of increased attenuation projecting over the lateral aspect of the right lower lung zone.  No pneumothorax or pleural effusions.  No free air beneath the diaphragm.  No acute osseous abnormalities.  Surgical clips project over the upper midline abdomen.                                Current Medications:     Infusions:       Scheduled:   busPIRone  5 mg Oral BID    clorazepate  3.75 mg Oral BID    enoxaparin  40 mg Subcutaneous Daily    lisinopril  10 mg Oral Daily    montelukast  10 mg Oral Daily    pantoprazole  40 mg Oral Daily        PRN:  albuterol-ipratropium, benzonatate             Assessment:     Autumn Harris is a 74 y.o. female with a PMH of hypertension, COPD, anxiety, depression, fibromyalgia presenting  with shortness of breath and productive cough likely secondary to viral URI vs pneumonia.      Plan:     Shortness of breath secondary to viral URI vs pneumonia   -Patient presents with 6 weeks of cough with progression to productive cough for the last week and SOB for the last day. She was treated by her PCP with azithromycin, codeine cough syrup, steroids, and ventolin with temporary improvement.   -On initial exam patient is tachycardic, tachypneic, coughing frequently, and anxious, but able to speak in full sentences and afebrile with normal white blood cell count.   -She was given duo-nebs, a dose of levaquin, methylprednisolone, and a dose of lorazepam in the emergency department.   -Procalc negative, no white blood cell count this morning, respiratory sputum culture with rare WBC and few gram positive cocci  -Patient is feeling improved this morning. She is no longer tachycardic, tachypneic, or anxious. She is ready to go home today      Anion gap metabolic acidosis  -Patient has decreased PO intake due to difficulty eating secondary coughing.   -Her lactic acid was 3.8 in the emergency department  -After fluids, this morning the patient's lactic acid was 1.6      Hypertension  -Patient is hypertensive in the emergency department, likely related to anxiety. She is on lisinopril 10 mg at home.  -Will continue home regimen of lisinopril 10 mg     Anxiety  -continue home buspirone and clorazepate     GERD  -continue home pantoprazole 40 mg     Code Status:      Full        Fluids: LR bolus  Diet: Low sodium  DVTppx: Lovenox  Dispo: Will observe the patient overnight for worsening of symptoms and pending repeat lactic acid.            Code: Full  Fluids  Electrolytes  Diet: Low sodium  DVTppx: Lovenox  Dispo: Likely discharge today      REBA Diaz  [facility] Internal Medicine  [facility] Hospitalist Team B    [facility] Medicine Hospitalist Pager numbers:   [facility] Hospitalist Medicine Team A (Maximo/John): [phone]  [facility]  Hospitalist Medicine Team B (Tamie/Yael):  [phone]

## 2019-07-03 NOTE — PLAN OF CARE
This  put name on white board and explained blue discharge folder to patient. Discharge planning brochure and/or business card given to patient.  Patient verbalized understanding.    TN met with patient. Daughter Raven in room at bedside. Pt states that she is independent with ADL's and drives. Daughter will bring pt home on discharge. Pt has cane, rolling walker, and BSC. Pt lives at home alone and is able to drive self to and from follow up appointments. Will continue to monitor needs.    Future Appointments   Date Time Provider Department Center   10/17/2019  2:30 PM Arjun Horn MD [facility] CARDIO [facility]        07/03/19 8024   Discharge Assessment   Assessment Type Discharge Planning Assessment   Confirmed/corrected address and phone number on facesheet? Yes   Assessment information obtained from? Patient;Caregiver;Medical Record   Expected Length of Stay (days) 2   Communicated expected length of stay with patient/caregiver yes   Prior to hospitilization cognitive status: Alert/Oriented   Prior to hospitalization functional status: Independent;Assistive Equipment   Current cognitive status: Alert/Oriented   Current Functional Status: Independent;Assistive Equipment   Facility Arrived From: ED   Lives With alone   Able to Return to Prior Arrangements yes   Is patient able to care for self after discharge? Yes   Who are your caregiver(s) and their phone number(s)? Sonia (sister) [phone]   Patient's perception of discharge disposition home or selfcare   Readmission Within the Last 30 Days no previous admission in last 30 days   Patient currently being followed by outpatient case management? No   Patient currently receives any other outside agency services? No   Equipment Currently Used at Home bedside commode;cane, straight;walker, rolling   Do you have any problems affording any of your prescribed medications? No   Is the patient taking medications as prescribed? yes   Does the patient  have transportation home? Yes   Transportation Anticipated family or friend will provide   Does the patient receive services at the [facility]? No   Discharge Plan A Home   DME Needed Upon Discharge  none   Patient/Family in Agreement with Plan yes

## 2019-07-03 NOTE — PLAN OF CARE
Problem: Adult Inpatient Plan of Care  Goal: Plan of Care Review  Outcome: Ongoing (interventions implemented as appropriate)  Patient placed on a continuous pulse ox sats % on RA. IV fluids in progress. Lactate Acid lab draw every 4 hours. Specimens collected for urine and respiratory as well as nasal swab. Remains on Telemetry ST/-86. Medication given for anxiety and pain as ordered. IV antibiotics ordered every 24 hours.

## 2019-07-03 NOTE — MEDICAL/APP STUDENT
"[facility] Medicine H&P Note     Admitting Team: [facility] Hospitalist Team B  Attending Physician: Mejia Conley MD  Resident: Phani Bourgeois  Intern: Osmel Joyce    Date of Admit: 2019    Chief Complaint     Cough and shortness of breath    Subjective:      History of Present Illness:  Ms. Harris is a 75 y/o female with a history of hypertension, COPD, anxiety, depression, fibromyalgia, squamous cell carcinoma who presented to [facility] emergency department on 2019 complaining of productive cough. She states that the symptoms began 6 weeks ago with a dry cough. She was seen by her PCP about a month ago and prescribed azithromycin, cough syrup with codeine, steroids, and ventolin inhaler. Her symptoms improved with the medications, but returned 2 weeks ago after she finished the medications. She reports worsening since that time with acute worsening last night and today. Currently, she describes a productive cough with "thick green sputum." She reports associated symptoms of shortness of breath, sore throat, nausea, chest pain, abdominal pain, headache, and decreased appetite. She denies fever, chills, nasal congestion, rhinorrhea, congestion, vomiting, or leg swelling. She is a former smoker, quit 20 years ago (60 pack years). She had her pneumococcal vaccine over 10 years ago and has not had her influenza vaccine this year. No known sick contacts, recent travel, or recent surgeries.     Past Medical History:  Oral cancer (squamous cell carcinoma)  Hypertension  Anxiety  Depression  Fibromyalgia  COPD       Past Surgical History:  Hysterectomy  Oophorectomy    Hernia repair x 2  Abdominal surgery ([place])  SCC excision ()    Allergies:  Penicillins (rash, N/V)  Sulfa (rash, N/V)      Home Medications:  Prior to Admission medications    Medication Sig Start Date End Date Taking? Authorizing Provider   busPIRone (BUSPAR) 5 MG Tab Take 5 mg by mouth 2 (two) times daily.   Yes Historical " Provider, MD   clorazepate (TRANXENE) 3.75 MG Tab Take 7.5 mg by mouth 2 (two) times daily.    Yes Historical Provider, MD   lisinopril 10 MG tablet TAKE 1 TABLET (10 MG TOTAL) BY MOUTH ONCE DAILY. 19 Yes Arjun Horn MD   montelukast (SINGULAIR) 10 mg tablet Take 10 mg by mouth once daily. 17  Yes Historical Provider, MD   pantoprazole (PROTONIX) 40 MG tablet Take 40 mg by mouth once daily.   Yes Historical Provider, MD   polyethylene glycol (GLYCOLAX) 17 gram/dose powder MIX 17 G WITH LIQUID EVERY DAY BY ORAL ROUTE FOR 30 DAYS 10/30/18  Yes Historical Provider, MD   traMADol (ULTRAM) 50 mg tablet Take 50 mg by mouth every 8 (eight) hours as needed for Pain.   Yes Historical Provider, MD   VENTOLIN HFA 90 mcg/actuation inhaler Inhale 2 puffs into the lungs as needed.  17  Yes Historical Provider, MD   zolpidem (AMBIEN) 10 mg Tab Take 5 mg by mouth nightly as needed.   Yes Historical Provider, MD   azelastine (ASTELIN) 137 mcg (0.1 %) nasal spray 2 SPRAY(S) INTO EACH NOSTRIL TWICE A DAY 17   Historical Provider, MD   ESTRACE 0.01 % (0.1 mg/gram) vaginal cream  10/22/17   Historical Provider, MD   fluticasone (FLONASE) 50 mcg/actuation nasal spray 2 SPRAYS DAILY PER NOSTRIL 16   Historical Provider, MD   meclizine (ANTIVERT) 12.5 mg tablet as needed.  17   Historical Provider, MD   tiZANidine (ZANAFLEX) 4 MG tablet 8 mg every evening. 3/3/18 7/2/19  Historical Provider, MD         Supplements:    Family History:  CHF - Mother ( at 76)  Pacemaker - Brother ( at 62)  Heart disease - Brother ( at 53)  Heart disease - Father (age 59)  Metastatic Lung CA - Brother (age 52)      Social History:  Social History     Tobacco Use    Smoking status: Former Smoker     Packs/day: 1.50     Years: 45.00     Pack years: 67.50    Smokeless tobacco: Former User     Quit date: 1995    Tobacco comment: 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   Substance Use Topics  "   Alcohol use: No    Drug use: Not on file       Review of Systems:  All other systems are reviewed and are negative other than those in the HPI.    Health Maintaince :   Primary Care Physician: Dr. Tonya Vega    Immunizations:   Flu - Not UTD  PNA - over 10 years ago       Objective:   Last 24 Hour Vital Signs:  BP  Min: 136/70  Max: 183/90  Temp  Av.8 °F (36.6 °C)  Min: 97.7 °F (36.5 °C)  Max: 97.9 °F (36.6 °C)  Pulse  Av.9  Min: 67  Max: 118  Resp  Av  Min: 20  Max: 45  SpO2  Av.7 %  Min: 95 %  Max: 100 %  Height  Av' 5" (165.1 cm)  Min: 5' 5" (165.1 cm)  Max: 5' 5" (165.1 cm)  Weight  Av.5 kg (140 lb)  Min: 63.5 kg (140 lb)  Max: 63.5 kg (140 lb)  Body mass index is 23.3 kg/m².  I/O last 3 completed shifts:  In: 150 [IV Piggyback:150]  Out: -     Physical Examination:    General: Alert, cooperative, appears anxious, tachypneic  Head: Normocephalic, atraumatic.   Eyes: conjunctiva clear, EOMI.  Ears: external ear canals clear   Nose: Nares normal without rhinorrhea.   Throat: moist mucous membranes  Neck: Full ROM without lymphadenopathy  Lungs: Clear to ausculation bilaterally without wheezes, rales, or rhonci. Tachypnea, but able to complete full sentences. No chest tenderness to palpitation.   Cardiac: Tachycardia with regular rhythm without murmur, rub or gallop    Abdomen: soft, non-tender, bowel sounds present in all four quadrants  Extremities: No edema, moving all extremities x 4  Neuro: A&Ox3, intact strength and sensation     Laboratory:  Most Recent Data:  CBC:   Lab Results   Component Value Date    WBC 7.41 2019    HGB 12.9 2019    HCT 40.2 2019     2019    MCV 85 2019    RDW 14.3 2019       BMP:   Lab Results   Component Value Date     2019    K 3.9 2019     2019    CO2 17 (L) 2019    BUN 6 (L) 2019    CREATININE 0.8 2019     (H) 2019    CALCIUM 10.4 " 07/02/2019    MG 1.7 10/13/2017     LFTs:   Lab Results   Component Value Date    PROT 7.6 07/02/2019    ALBUMIN 4.1 07/02/2019    BILITOT 0.6 07/02/2019    AST 24 07/02/2019    ALKPHOS 114 07/02/2019    ALT 15 07/02/2019     Coags:   Lab Results   Component Value Date    INR 1.0 10/13/2017     FLP:   Lab Results   Component Value Date    CHOL 172 10/13/2017    HDL 47 10/13/2017    LDLCALC 112.2 10/13/2017    TRIG 64 10/13/2017    CHOLHDL 27.3 10/13/2017     DM:   Lab Results   Component Value Date    HGBA1C 5.5 10/13/2017    LDLCALC 112.2 10/13/2017    CREATININE 0.8 07/02/2019     Thyroid:   Lab Results   Component Value Date    TSH 0.835 10/13/2017     Anemia: No results found for: IRON, TIBC, FERRITIN, [id number], FOLATE  Cardiac:   Lab Results   Component Value Date    TROPONINI <0.006 07/02/2019    BNP 46 07/02/2019     Urinalysis:   Lab Results   Component Value Date    LABURIN CITROBACTER FREUNDII  >100,000 cfu/ml   11/07/2017    COLORU Yellow 11/07/2017    SPECGRAV 1.005 11/07/2017    NITRITE Positive (A) 11/07/2017    KETONESU Negative 11/07/2017    UROBILINOGEN Negative 11/07/2017    WBCUA >100 (H) 11/07/2017       Trended Lab Data:  Recent Labs   Lab 07/02/19  1509   WBC 7.41   HGB 12.9   HCT 40.2      MCV 85   RDW 14.3      K 3.9      CO2 17*   BUN 6*   CREATININE 0.8   *   PROT 7.6   ALBUMIN 4.1   BILITOT 0.6   AST 24   ALKPHOS 114   ALT 15       Trended Cardiac Data:  Recent Labs   Lab 07/02/19  1509   TROPONINI <0.006   BNP 46             Radiology:  Imaging Results          X-Ray Chest 1 View (Final result)  Result time 07/02/19 15:00:41    Final result by Son Costa MD (07/02/19 15:00:41)                 Impression:      1. Subtle subsegmental area of increased attenuation projecting over the lateral aspect of the right lower lobe, possibly atelectasis or developing consolidation.  Consider follow-up radiograph in 6 weeks.      Electronically signed by: Son  MD Julissa  Date:    07/02/2019  Time:    15:00             Narrative:    EXAMINATION:  XR CHEST 1 VIEW    CLINICAL HISTORY:  Shortness of breath    TECHNIQUE:  Single frontal view of the chest was performed.    COMPARISON:  CTA 10/12/2017.  Radiograph 10/12/2017.    FINDINGS:  Cardiac monitoring leads project over the bilateral hemithoraces.  Mediastinal structures are midline.  Hilar contours are unremarkable.  Cardiac silhouette is normal in size.  Lung volumes are symmetric.  There is a subsegmental area of increased attenuation projecting over the lateral aspect of the right lower lung zone.  No pneumothorax or pleural effusions.  No free air beneath the diaphragm.  No acute osseous abnormalities.  Surgical clips project over the upper midline abdomen.                                 Assessment:     Autumn Harris is a 74 y.o. female with a PMH of hypertension, COPD, anxiety, depression, fibromyalgia, oral cancer presenting with productive cough and shortness of breath with small right lower lobe infiltrate on CXR consistent with probable viral URI vs pneumonia.       Plan:   Shortness of breath and cough secondary to suspected viral URI vs pneumonia  -Patient presents with 6 weeks of cough with progression to productive cough for the last week and SOB for the last day. She was treated by her PCP with azithromycin, codeine cough syrup, steroids, and ventolin with temporary improvement.   -On exam patient is tachycardic, tachypneic, coughing frequently, and anxious, but able to speak in full sentences and afebrile with normal white blood cell count.   -She was given duo-nebs, a dose of levaquin, methylprednisolone, and a dose of lorazepam in the emergency department.   -Will continue the patient on levaquin, duo-nebs every 4 hours as needed, Tessalon as needed, montelukast   -Will check a procalcitonin, viral panel, and sputum culture    Anion gap metabolic acidosis  -Patient has decreased PO intake due to  difficulty eating secondary coughing.   -Her lactic acid was 3.8 in the emergency department  -Will give the patient fluids and repeat lactic acid    Hypertension  -Patient is hypertensive in the emergency department, likely related to anxiety. She is on lisinopril 10 mg at home.  -Will continue home regimen and monitor her BP    Anxiety  -continue home buspirone and clorazepate    GERD  -continue home pantoprazole 40 mg    Code Status:     Full      Fluids: LR bolus  Diet: Low sodium  DVTppx: Lovenox  Dispo: Will observe the patient overnight for worsening of symptoms and pending repeat lactic acid.     REBA Diaz  [facility] Internal Medicine  [facility] Hospitalist Team B    [facility] Medicine Hospitalist Pager numbers:   [facility] Hospitalist Medicine Team A (Maximo/John): [phone]  [facility] Hospitalist Medicine Team B (Tamie/Yael):  [phone]

## 2019-07-03 NOTE — PLAN OF CARE
Evaluated patient this evening for admission for shortness of breath with productive cough. Her symptoms have been persistent for 6 weeks, however she felt acutely worse shortness of breath today, prompting her presentation to the ED. On interview, patient is resting comfortably in bed with no increased work of breathing and is able to speak in full sentences, only having to stop to cough. Patient's initial vitals in the ED show that patient was afebrile with RR 20 and /70 with O2 sats 98% on room air. Patient becomes visibly anxious during interview, causing her respiratory rate and blood pressure to increase on the monitor. This lasts for a very short time period before patient once again appears comfortable without increased work of breathing. Patient has also been receiving albuterol nebulization treatments while in the ED, which can explain persistence of her tachycardia. Patient has never been hypoxic and continued to maintain normal O2 sats even while appearing extremely anxious. The patient is not a candidate for ICU admission at this time and I feel that it is reasonable to observe her on the floor overnight.    Phani Bourgeois MD  [facility] Internal Medicine -II

## 2019-07-03 NOTE — PROGRESS NOTES
"[facility] Medicine Progress Note    Primary Team: [facility] Hospitalist Team B  Attending Physician: Mejia Conley MD  Resident: Phani Bourgeois  Intern: Osmel Joyce    Subjective:      Patient states that she is one hundred percent better this morning. Her cough, SOB, and chest pain has improved. She states she has not slept well so continues to have a headache. Overnight, patient had a cough and SOB which she received guaifenesin and ativan for. She has since felt a lot better.     Objective:     Last 24 Hour Vital Signs:  BP  Min: 136/70  Max: 184/86  Temp  Av.6 °F (36.4 °C)  Min: 96 °F (35.6 °C)  Max: 98 °F (36.7 °C)  Pulse  Av.2  Min: 67  Max: 118  Resp  Av  Min: 18  Max: 45  SpO2  Av.8 %  Min: 95 %  Max: 100 %  Height  Av' 5" (165.1 cm)  Min: 5' 5" (165.1 cm)  Max: 5' 5" (165.1 cm)  Weight  Av.5 kg (137 lb 12.7 oz)  Min: 61.5 kg (135 lb 9.3 oz)  Max: 63.5 kg (140 lb)  I/O last 3 completed shifts:  In: 150 [IV Piggyback:150]  Out: -     Physical Examination:  General appearance: alert, appears stated age and cooperative  Nose: Septum midline. Mucosa normal. No drainage or sinus tenderness. MOHS surgeryon R Nare.  Throat: lips, mucosa, and tongue normal; teeth and gums normal  Lungs: clear to auscultation bilaterally and normal percussion bilaterally Occasional coughing on deep breathing.  Heart: regular rate and rhythm, S1, S2 normal, no murmur, click, rub or gallop  Abdomen: soft, non-tender; bowel sounds normal; no masses,  no organomegaly  Extremities: extremities normal, atraumatic, no cyanosis or edema  Pulses: 2+ and symmetric  Lymph nodes: Cervical, supraclavicular, and axillary nodes normal.  Neurologic: Alert and oriented X 3, normal strength and tone. Normal symmetric reflexes. Normal coordination and gait      Laboratory:  Laboratory Data Reviewed: yes  Pertinent Findings:  CO2- 19  Anion gap- 12      Microbiology Data Reviewed: yes  Pertinent Findings:  Sputum Cx- Few " WBC and few gram positive cocci.    Other Results:  Radiology Data Reviewed: yes  No new radiology since H&P.    Current Medications:     Infusions:       Scheduled:   busPIRone  5 mg Oral BID    clorazepate  3.75 mg Oral BID    enoxaparin  40 mg Subcutaneous Daily    lisinopril  10 mg Oral Daily    montelukast  10 mg Oral Daily    pantoprazole  40 mg Oral Daily        PRN:  albuterol-ipratropium, benzonatate    Antibiotics and Day Number of Therapy:  Levaquin day 2    Lines and Day Number of Therapy:  Peripheral IV- Single lumen Day 2    Assessment:     Autumn Harris is a 74 y.o.female with  Patient Active Problem List    Diagnosis Date Noted    Sore throat 07/03/2019    SOB (shortness of breath) 07/02/2019    Shortness of breath 07/02/2019    High anion gap metabolic acidosis 07/02/2019    History of squamous cell carcinoma 07/02/2019    Chronic gastritis without bleeding 10/18/2017    Generalized abdominal pain 10/17/2017    Syncope 10/12/2017    Hyponatremia 10/12/2017    Hypokalemia 10/12/2017    Hypomagnesemia 10/12/2017    Acute abdominal pain 10/12/2017    Chest pain of uncertain etiology 02/24/2017    Gastroesophageal reflux disease without esophagitis 02/24/2017    Dyspnea on exertion 08/23/2016    Aortic valve insufficiency 08/23/2016    Essential hypertension 02/05/2016    Chronic bronchitis 02/05/2016    Anxiety 02/05/2016    Nonspecific abnormal electrocardiogram (ECG) (EKG) 02/05/2016    Premature atrial contractions 02/05/2016        Plan:     Shortness of breath 2/2 suspected viral URI  - Patient presents with 6 week hx of SOB and dry cough, progressed to productive x 1 week, worsening SOB x 1 day. Previously treated with Azithromycin and Codeine cough medicine last month.  - On examination, patient tachcardic and coughing frequently, however, is able to speak in full sentences and does not appear to have increased work of breathing.   - patient afebrile, normal WBC.  Negative procal.  - Xrays in ED showed small RLL infiltrate  - given dose of levaquin and 2 duo-nebs in ED  - Continue duo-nebs q4hrs prn.  - Sputum culture prelim results shows rare WBC, and few gram positive cocci.  - Resp viral panel in process  - Levaquin was continued overnight, procal reported negative. Discontinued this morning.     Lactic acidosis 2/2 suspected hyperventilation syndrome  - Lactic acid initially 5.8 on admission trended to 8.0. Normal Saline drip started at 8.0.  - Lactic acid 1.6 this morning.      Hypertension  - Patient is on lisinopril 10mg.  - Patient was hypertensive in the ED, possibly due to anxiety, is trending downward without intervention. BP normalized when she arrived on floor without intervention.  - Will continue home regimen.     Anxiety  -Patient on Buspar and Tranxene daily.  - Patient required prn ativan overnight, likely need home regimen adjustment with PCP due to severe anxiety.  -Continuing on home regimen while inpatient.     GERD  - Patient has hx of gastric ulcers s/p surgical resection. On protonix. Will continue home regimen.     Hx of Cancer- SCC head and neck  - Patient with hx of head and neck cancer s/p surgical resection and treatment in 1995. No acute issues.    Dispo: Patient appears well this morning, will consider discharge to home today. Patient's family members have spent the night with her and will take her home.    Osmel Joyce MD  [facility] Internal Medicine HO-I    [facility] Medicine Hospitalist Pager numbers:   [facility] Hospitalist Medicine Team A (Maximo/John): [phone]  [facility] Hospitalist Medicine Team B (Tamie/Yael):  [phone]

## 2019-07-03 NOTE — ED NOTES
Spoke with House [facility], in order to facilitate room assignment, requested that admit team write for bp, heart rate and respiratory rate parameters.  Dr Bourgeois notified of request and lactic acid result of 3.8.  MD will write for fluid bolus and VS parameters for patient

## 2019-07-03 NOTE — PLAN OF CARE
Problem: Adult Inpatient Plan of Care  Goal: Plan of Care Review  Outcome: Ongoing (interventions implemented as appropriate)  Admitted to 530.  Initiated care plan and teaching. Chart review done.

## 2019-07-04 NOTE — DISCHARGE SUMMARY
"[facility] Hospital Medicine Discharge Summary    Primary Team: [facility] Hospitalist Team B  Attending Physician: Mejia Conley MD  Resident: Phani Bourgeois  Intern: Osmel Joyce    Date of Admit: 7/2/2019  Date of Discharge: 7/3/2019    Discharge to: Home  Condition: Stable    Discharge Diagnoses     Patient Active Problem List   Diagnosis    Essential hypertension    Chronic bronchitis    Anxiety    Nonspecific abnormal electrocardiogram (ECG) (EKG)    Premature atrial contractions    Dyspnea on exertion    Aortic valve insufficiency    Chest pain of uncertain etiology    Gastroesophageal reflux disease without esophagitis    Syncope    Hyponatremia    Hypokalemia    Hypomagnesemia    Acute abdominal pain    Generalized abdominal pain    Chronic gastritis without bleeding    History of squamous cell carcinoma    Sore throat       Consultants and Procedures     Consultants:  None    Procedures:   None    Imaging:  CXR: Subtle subsegmental area of increased attenuation projecting over the lateral aspect of the right lower lobe, possibly atelectasis or developing consolidation.  Consider follow-up radiograph in 6 weeks.    Brief History of Present Illness      Autumn Harris is a 74 y.o. female with a pmh of Anxiety, COPD, and htn who presented to [facility] on 7/2/2019 with a primary complaint of Cough. She has had a productive cough of "thick green sputum" that has been getting worse. She has associated SOB, Nausea, chest pain, sore throat, decreased appetite and headaches. She denies fevers/chills/vomit/rhinorrhea/leg swelling. She is a former smoker quit in 1995 (~60pack years). She denies travel or any sick contacts. She has not had her flu shot this year.     She stated that she saw her PCP over a monthago for a similar problem x 2 weeks duration and was prescribed Zithromax and codeine cough syrup, which has helped. She finished her medications 2 weeks ago and has now had the symptoms " return.    For the full HPI please refer to the History & Physical from this admission.    Hospital Course By Problem with Pertinent Findings     Shortness of breath 2/2 suspected viral URI  - Patient presented with 6 week hx of SOB and dry cough, progressed to productive x 1 week, worsening SOB x 1 day.Was previously treated with Azithromycin and Codeine cough medicine last month.  - On examination in ED, patient tachcardic and coughing frequently, however, is able to speak in full sentences and does not appear to have increased work of breathing. Patient was afebrile, normal WBC.  - Xrays in ED showed small RLL infiltrate, she was given dose of levaquin and 2 duo-nebs in ED.  - Patient was then transferred to floor where she received duo-nebs q4 and ativan. Sputum cultures and viral panel taken, prelim results of sputum showed rare WBC, and few gram positive cocci.  - Levaquin was continued overnight, procal reported negative.  - Patient improved overnight- stated she felt 100% better and wanted to go home. Recommended discharge on course of doxycylcine and tessalon with follow up with her PCP next week.     Lactic acidosis 2/2 suspected hyperventilation syndrome  - Lactic acid initially 5.8 on admission trended to 8.0. Normal Saline drip started at 8.0.  - Lactic acid 1.6 this morning.      Hypertension  - Patient is on lisinopril 10mg.  - Patient was hypertensive in the ED, possibly due to anxiety, is trending downward without intervention. BP normalized when she arrived on floor without intervention.  - continued home regimen on floor. Morning of discharge BPs were back in the 180s/90s. I asked the patient if this is a regular occurrence for her and she states that this fluctuation happens all the time. I explained to her that this appears to be a chronic issue and that she will need to have her blood pressure medication adjusted. Administered a second dose of lisinopril which brought her BP down to 147/98 on  discharge.   Patient was instructed to monitor her BP regularly and states she can call her PCP for an appointment on Monday. I sent her PCP Dr. Vega a personal message about the case.     Anxiety  -Patient on Buspar and Tranxene daily.  - Patient required prn ativan overnight, likely need home regimen adjustment with PCP due to severe anxiety.  -Continued on home regimen while inpatient. Family members expressed concern to overnight residents that she may not have her anxiety well controlled. Concerns relayed to PCP.     GERD  - Patient has hx of gastric ulcers s/p surgical resection. On protonix. Continued on home regimen.     Hx of Cancer- SCC head and neck  - Patient with hx of head and neck cancer s/p surgical resection and treatment in 1995. No acute issues.    Discharge Medications      Autumn Harris   Home Medication Instructions EZRA:[id number]    Printed on:07/03/19 1440   Medication Information                      albuterol (VENTOLIN HFA) 90 mcg/actuation inhaler  Inhale 2 puffs into the lungs every 6 (six) hours as needed for Wheezing. Rescue             azelastine (ASTELIN) 137 mcg (0.1 %) nasal spray  2 SPRAY(S) INTO EACH NOSTRIL TWICE A DAY             benzonatate (TESSALON) 100 MG capsule  Take 1 capsule (100 mg total) by mouth 3 (three) times daily as needed for Cough.             busPIRone (BUSPAR) 5 MG Tab  Take 5 mg by mouth 2 (two) times daily.             clorazepate (TRANXENE) 3.75 MG Tab  Take 7.5 mg by mouth 2 (two) times daily.              doxycycline (VIBRA-TABS) 100 MG tablet  Take 1 tablet (100 mg total) by mouth 2 (two) times daily. for 7 days             ESTRACE 0.01 % (0.1 mg/gram) vaginal cream               fluticasone (FLONASE) 50 mcg/actuation nasal spray  2 SPRAYS DAILY PER NOSTRIL             lisinopril 10 MG tablet  TAKE 1 TABLET (10 MG TOTAL) BY MOUTH ONCE DAILY.             meclizine (ANTIVERT) 12.5 mg tablet  as needed.              montelukast (SINGULAIR) 10 mg  tablet  Take 10 mg by mouth once daily.             pantoprazole (PROTONIX) 40 MG tablet  Take 40 mg by mouth once daily.             polyethylene glycol (GLYCOLAX) 17 gram/dose powder  MIX 17 G WITH LIQUID EVERY DAY BY ORAL ROUTE FOR 30 DAYS             traMADol (ULTRAM) 50 mg tablet  Take 50 mg by mouth every 8 (eight) hours as needed for Pain.             VENTOLIN HFA 90 mcg/actuation inhaler  Inhale 2 puffs into the lungs as needed.              zolpidem (AMBIEN) 10 mg Tab  Take 5 mg by mouth nightly as needed.                 Discharge Information:   Diet:  As tolerated.    Physical Activity:  As tolerated.             Instructions:  1. Take all medications as prescribed  2. Keep all follow-up appointments  3. Return to the hospital or call your primary care physicians if any worsening symptoms such as fever, chest pain, shortness of breath, return of symptoms, or any other concerns.    Follow-Up Appointments:  Patient wishes to call Dr. Vega on Monday when he returns from vacation. Instructed to request appointment within the week. Message was sent to Dr. Vega's inbox.    Osmel Joyce MD  [facility] Internal Medicine, [facility]

## 2019-07-05 LAB
BACTERIA SPEC AEROBE CULT: NORMAL
BACTERIA SPEC AEROBE CULT: NORMAL
GRAM STN SPEC: NORMAL

## 2019-07-06 LAB
DRUGS UR: NORMAL
OTC URINE DRUG SCREEN - SUSPECT DRUG: NORMAL
OTC URINE DRUG SCREEN CHAIN OF CUSTODY: NORMAL

## 2019-07-08 LAB
ENTEROVIRUS: NOT DETECTED
HUMAN BOCAVIRUS: NOT DETECTED
HUMAN CORONAVIRUS, COMMON COLD VIRUS: NOT DETECTED
INFLUENZA A - H1N1-09: NOT DETECTED
PARAINFLUENZA: NOT DETECTED
RVP - ADENOVIRUS: NOT DETECTED
RVP - HUMAN METAPNEUMOVIRUS (HMPV): NOT DETECTED
RVP - INFLUENZA A: NOT DETECTED
RVP - INFLUENZA B: NOT DETECTED
RVP - RESPIRATORY SYNCTIAL VIRUS (RSV) A: NOT DETECTED
RVP - RESPIRATORY VIRAL PANEL, SOURCE: NORMAL
RVP - RHINOVIRUS: NOT DETECTED

## 2019-10-17 ENCOUNTER — OFFICE VISIT (OUTPATIENT)
Dept: CARDIOLOGY | Facility: CLINIC | Age: 74
End: 2019-10-17
Payer: MEDICARE

## 2019-10-17 VITALS
WEIGHT: 136.88 LBS | SYSTOLIC BLOOD PRESSURE: 154 MMHG | BODY MASS INDEX: 22.81 KG/M2 | HEART RATE: 59 BPM | DIASTOLIC BLOOD PRESSURE: 77 MMHG | HEIGHT: 65 IN

## 2019-10-17 DIAGNOSIS — J42 CHRONIC BRONCHITIS, UNSPECIFIED CHRONIC BRONCHITIS TYPE: ICD-10-CM

## 2019-10-17 DIAGNOSIS — R06.09 DYSPNEA ON EXERTION: ICD-10-CM

## 2019-10-17 DIAGNOSIS — Z85.89 HISTORY OF SQUAMOUS CELL CARCINOMA: ICD-10-CM

## 2019-10-17 DIAGNOSIS — I35.1 AORTIC REGURGITATION: ICD-10-CM

## 2019-10-17 DIAGNOSIS — R07.9 CHEST PAIN OF UNCERTAIN ETIOLOGY: ICD-10-CM

## 2019-10-17 DIAGNOSIS — I49.1 PREMATURE ATRIAL CONTRACTIONS: ICD-10-CM

## 2019-10-17 DIAGNOSIS — I10 ESSENTIAL HYPERTENSION: Primary | ICD-10-CM

## 2019-10-17 DIAGNOSIS — I35.1 AORTIC VALVE INSUFFICIENCY, ETIOLOGY OF CARDIAC VALVE DISEASE UNSPECIFIED: ICD-10-CM

## 2019-10-17 DIAGNOSIS — R94.31 NONSPECIFIC ABNORMAL ELECTROCARDIOGRAM (ECG) (EKG): ICD-10-CM

## 2019-10-17 PROCEDURE — 3077F PR MOST RECENT SYSTOLIC BLOOD PRESSURE >= 140 MM HG: ICD-10-PCS | Mod: CPTII,S$GLB,, | Performed by: INTERNAL MEDICINE

## 2019-10-17 PROCEDURE — 3078F DIAST BP <80 MM HG: CPT | Mod: CPTII,S$GLB,, | Performed by: INTERNAL MEDICINE

## 2019-10-17 PROCEDURE — 3077F SYST BP >= 140 MM HG: CPT | Mod: CPTII,S$GLB,, | Performed by: INTERNAL MEDICINE

## 2019-10-17 PROCEDURE — 1101F PR PT FALLS ASSESS DOC 0-1 FALLS W/OUT INJ PAST YR: ICD-10-PCS | Mod: CPTII,S$GLB,, | Performed by: INTERNAL MEDICINE

## 2019-10-17 PROCEDURE — 99999 PR PBB SHADOW E&M-EST. PATIENT-LVL III: ICD-10-PCS | Mod: PBBFAC,,, | Performed by: INTERNAL MEDICINE

## 2019-10-17 PROCEDURE — 1101F PT FALLS ASSESS-DOCD LE1/YR: CPT | Mod: CPTII,S$GLB,, | Performed by: INTERNAL MEDICINE

## 2019-10-17 PROCEDURE — 3078F PR MOST RECENT DIASTOLIC BLOOD PRESSURE < 80 MM HG: ICD-10-PCS | Mod: CPTII,S$GLB,, | Performed by: INTERNAL MEDICINE

## 2019-10-17 PROCEDURE — 93000 EKG 12-LEAD: ICD-10-PCS | Mod: S$GLB,,, | Performed by: INTERNAL MEDICINE

## 2019-10-17 PROCEDURE — 99214 OFFICE O/P EST MOD 30 MIN: CPT | Mod: 25,S$GLB,, | Performed by: INTERNAL MEDICINE

## 2019-10-17 PROCEDURE — 93000 ELECTROCARDIOGRAM COMPLETE: CPT | Mod: S$GLB,,, | Performed by: INTERNAL MEDICINE

## 2019-10-17 PROCEDURE — 99999 PR PBB SHADOW E&M-EST. PATIENT-LVL III: CPT | Mod: PBBFAC,,, | Performed by: INTERNAL MEDICINE

## 2019-10-17 PROCEDURE — 99214 PR OFFICE/OUTPT VISIT, EST, LEVL IV, 30-39 MIN: ICD-10-PCS | Mod: 25,S$GLB,, | Performed by: INTERNAL MEDICINE

## 2019-10-17 RX ORDER — SPIRONOLACTONE 50 MG/1
50 TABLET, FILM COATED ORAL DAILY
Qty: 90 TABLET | Refills: 3 | Status: SHIPPED | OUTPATIENT
Start: 2019-10-17 | End: 2020-10-13 | Stop reason: SDUPTHER

## 2019-10-17 NOTE — PROGRESS NOTES
Subjective:      Patient ID: Autumn Harris is a 74 y.o. female.    Chief Complaint: Shortness of Breath and Fatigue    HPI:  Admitted to the hospital with purulent bronchitis.    Pt c/o feeling tired.    Pt c/o more frequent chest pains    Pt c/o no energy.    Pt c/o chronic cough and chronic shortness of breath    Occasional green sputum    Review of Systems   Cardiovascular: Positive for chest pain (Chest pains occur both at rest and with exertion and last a minute or two and occur several times a week ), dyspnea on exertion and leg swelling (feet swell at times). Negative for claudication, irregular heartbeat, near-syncope, orthopnea, palpitations and syncope.      Pt sees Dr Hernandez.    Past Medical History:   Diagnosis Date    Anxiety     COPD (chronic obstructive pulmonary disease)     Depression     Encounter for blood transfusion     Fibromyalgia     High cholesterol     Hypertension     Oral cancer     Sore throat 7/3/2019    Urinary tract infection         Past Surgical History:   Procedure Laterality Date    ABDOMINAL SURGERY      [facility]    HYSTERECTOMY      OOPHORECTOMY      only one removed    Stomach Ulcer Surgery         Family History   Problem Relation Age of Onset    Heart disease Mother     Pacemaker/defibrilator Brother     Heart disease Brother     Heart disease Maternal Aunt     Lung cancer Brother        Social History     Socioeconomic History    Marital status:      Spouse name: Not on file    Number of children: Not on file    Years of education: Not on file    Highest education level: Not on file   Occupational History    Not on file   Social Needs    Financial resource strain: Not on file    Food insecurity:     Worry: Not on file     Inability: Not on file    Transportation needs:     Medical: Not on file     Non-medical: Not on file   Tobacco Use    Smoking status: Former Smoker     Packs/day: 1.50     Years: 45.00     Pack years: 67.50     Smokeless tobacco: Never Used    Tobacco comment: 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   Substance and Sexual Activity    Alcohol use: No    Drug use: Not on file    Sexual activity: Not on file   Lifestyle    Physical activity:     Days per week: Not on file     Minutes per session: Not on file    Stress: Not on file   Relationships    Social connections:     Talks on phone: Not on file     Gets together: Not on file     Attends Latter day service: Not on file     Active member of club or organization: Not on file     Attends meetings of clubs or organizations: Not on file     Relationship status: Not on file   Other Topics Concern    Not on file   Social History Narrative    Not on file       Current Outpatient Medications on File Prior to Visit   Medication Sig Dispense Refill    albuterol (VENTOLIN HFA) 90 mcg/actuation inhaler Inhale 2 puffs into the lungs every 6 (six) hours as needed for Wheezing. Rescue 18 g 0    azelastine (ASTELIN) 137 mcg (0.1 %) nasal spray 2 SPRAY(S) INTO EACH NOSTRIL TWICE A DAY  5    busPIRone (BUSPAR) 5 MG Tab Take 5 mg by mouth 2 (two) times daily.      clorazepate (TRANXENE) 3.75 MG Tab Take 7.5 mg by mouth 2 (two) times daily.       ESTRACE 0.01 % (0.1 mg/gram) vaginal cream       fluticasone (FLONASE) 50 mcg/actuation nasal spray 2 SPRAYS DAILY PER NOSTRIL  0    meclizine (ANTIVERT) 12.5 mg tablet as needed.       montelukast (SINGULAIR) 10 mg tablet Take 10 mg by mouth once daily.  11    polyethylene glycol (GLYCOLAX) 17 gram/dose powder MIX 17 G WITH LIQUID EVERY DAY BY ORAL ROUTE FOR 30 DAYS  5    traMADol (ULTRAM) 50 mg tablet Take 50 mg by mouth every 8 (eight) hours as needed for Pain.      VENTOLIN HFA 90 mcg/actuation inhaler Inhale 2 puffs into the lungs as needed.       [DISCONTINUED] lisinopril 10 MG tablet TAKE 1 TABLET (10 MG TOTAL) BY MOUTH ONCE DAILY. 90 tablet 3    pantoprazole (PROTONIX) 40 MG tablet Take 40 mg by mouth once  "daily.      zolpidem (AMBIEN) 10 mg Tab Take 5 mg by mouth nightly as needed.       No current facility-administered medications on file prior to visit.        Review of patient's allergies indicates:   Allergen Reactions    Pcn [penicillins]     Sulfa (sulfonamide antibiotics)      Objective:     Vitals:    10/17/19 1437   BP: (!) 154/77   BP Location: Left arm   Patient Position: Sitting   BP Method: Large (Automatic)   Pulse: (!) 59   Weight: 62.1 kg (136 lb 14.5 oz)   Height: 5' 5" (1.651 m)        Physical Exam   Constitutional: She is oriented to person, place, and time. She appears well-developed and well-nourished.   Eyes: No scleral icterus.   Neck: No JVD present. Carotid bruit is not present.   Cardiovascular: Normal rate and regular rhythm. Exam reveals gallop (S4).   Murmur (II/VI systolic) heard.  Pulmonary/Chest: Breath sounds normal.   Musculoskeletal: She exhibits no edema.   Neurological: She is alert and oriented to person, place, and time.   Skin: Skin is warm and dry.   Psychiatric: She has a normal mood and affect. Her behavior is normal. Judgment and thought content normal.   Vitals reviewed.       Wt is stable over past 3 years.    ECG: NSR, anteroseptal infarct, unchanged    Assessment:     1. Essential hypertension    2. Aortic valve insufficiency, etiology of cardiac valve disease unspecified    3. Nonspecific abnormal electrocardiogram (ECG) (EKG)    4. Premature atrial contractions    5. Chronic bronchitis, unspecified chronic bronchitis type    6. History of squamous cell carcinoma    7. Dyspnea on exertion    8. Aortic regurgitation      Plan:   Autumn was seen today for shortness of breath and fatigue.    Diagnoses and all orders for this visit:    Essential hypertension  -     IN OFFICE EKG 12-LEAD (to Muse)  -     CBC auto differential; Future  -     Comprehensive metabolic panel; Future  -     Brain natriuretic peptide; Future  -     Lipid panel; Future  -     TSH; " Future    Aortic valve insufficiency, etiology of cardiac valve disease unspecified  -     IN OFFICE EKG 12-LEAD (to Muse)  -     CBC auto differential; Future  -     Comprehensive metabolic panel; Future  -     Brain natriuretic peptide; Future  -     Lipid panel; Future  -     TSH; Future  -     X-Ray Chest PA And Lateral; Future    Nonspecific abnormal electrocardiogram (ECG) (EKG)  -     IN OFFICE EKG 12-LEAD (to Muse)    Premature atrial contractions  -     IN OFFICE EKG 12-LEAD (to Muse)    Chronic bronchitis, unspecified chronic bronchitis type  -     X-Ray Chest PA And Lateral; Future    History of squamous cell carcinoma    Dyspnea on exertion  -     IN OFFICE EKG 12-LEAD (to Muse)  -     CBC auto differential; Future  -     Comprehensive metabolic panel; Future  -     Brain natriuretic peptide; Future  -     Lipid panel; Future  -     TSH; Future  -     X-Ray Chest PA And Lateral; Future    Aortic regurgitation  -     IN OFFICE EKG 12-LEAD (to Muse)         Follow up in about 4 weeks (around 11/14/2019).     Treadmill stress echocardiogram    Stop astelin nose spray to see if fatigue improves    Discontinue the lisinopril to see if cough improves    Substitute spironolactone 50 mg daily    RTC one month    Repeat CXR     Repeat lab

## 2019-10-30 ENCOUNTER — HOSPITAL ENCOUNTER (OUTPATIENT)
Dept: RADIOLOGY | Facility: HOSPITAL | Age: 74
Discharge: HOME | End: 2019-10-30
Attending: INTERNAL MEDICINE
Payer: MEDICARE

## 2019-10-30 ENCOUNTER — HOSPITAL ENCOUNTER (OUTPATIENT)
Dept: CARDIOLOGY | Facility: HOSPITAL | Age: 74
Discharge: HOME | End: 2019-10-30
Attending: INTERNAL MEDICINE
Payer: MEDICARE

## 2019-10-30 VITALS — WEIGHT: 136 LBS | HEIGHT: 65 IN | BODY MASS INDEX: 22.66 KG/M2

## 2019-10-30 DIAGNOSIS — R94.31 NONSPECIFIC ABNORMAL ELECTROCARDIOGRAM (ECG) (EKG): ICD-10-CM

## 2019-10-30 DIAGNOSIS — R06.09 DYSPNEA ON EXERTION: ICD-10-CM

## 2019-10-30 DIAGNOSIS — R07.9 CHEST PAIN OF UNCERTAIN ETIOLOGY: ICD-10-CM

## 2019-10-30 DIAGNOSIS — J42 CHRONIC BRONCHITIS, UNSPECIFIED CHRONIC BRONCHITIS TYPE: ICD-10-CM

## 2019-10-30 DIAGNOSIS — I35.1 AORTIC VALVE INSUFFICIENCY, ETIOLOGY OF CARDIAC VALVE DISEASE UNSPECIFIED: ICD-10-CM

## 2019-10-30 PROCEDURE — 71046 XR CHEST PA AND LATERAL: ICD-10-PCS | Mod: 26,,, | Performed by: RADIOLOGY

## 2019-10-30 PROCEDURE — 71046 X-RAY EXAM CHEST 2 VIEWS: CPT | Mod: 26,,, | Performed by: RADIOLOGY

## 2019-10-30 PROCEDURE — 71046 X-RAY EXAM CHEST 2 VIEWS: CPT | Mod: TC,FY

## 2019-10-31 ENCOUNTER — TELEPHONE (OUTPATIENT)
Dept: CARDIOLOGY | Facility: CLINIC | Age: 74
End: 2019-10-31

## 2019-10-31 DIAGNOSIS — E83.52 HYPERCALCEMIA: Primary | ICD-10-CM

## 2019-10-31 LAB
AORTIC ROOT ANNULUS: 3.01 CM
ASCENDING AORTA: 3.12 CM
AV INDEX (PROSTH): 0.82
AV MEAN GRADIENT: 6 MMHG
AV PEAK GRADIENT: 10 MMHG
AV VALVE AREA: 2.46 CM2
AV VELOCITY RATIO: 0.81
CV ECHO LV RWT: 0.7 CM
CV STRESS BASE HR: 74 BPM
DIASTOLIC BLOOD PRESSURE: 72 MMHG
DOP CALC AO PEAK VEL: 1.59 M/S
DOP CALC AO VTI: 34.7 CM
DOP CALC LVOT AREA: 3 CM2
DOP CALC LVOT DIAMETER: 1.95 CM
DOP CALC LVOT PEAK VEL: 1.29 M/S
DOP CALC LVOT STROKE VOLUME: 85.19 CM3
DOP CALCLVOT PEAK VEL VTI: 28.54 CM
E WAVE DECELERATION TIME: 244.32 MSEC
E/A RATIO: 0.96
ECHO LV POSTERIOR WALL: 1.3 CM (ref 0.6–1.1)
FRACTIONAL SHORTENING: 38 % (ref 28–44)
INTERVENTRICULAR SEPTUM: 1.4 CM (ref 0.6–1.1)
IVRT: 0.13 MSEC
LA MAJOR: 4.82 CM
LA MINOR: 5.05 CM
LA WIDTH: 3.59 CM
LEFT ATRIUM SIZE: 3.15 CM
LEFT ATRIUM VOLUME INDEX: 28.2 ML/M2
LEFT ATRIUM VOLUME: 47.41 CM3
LEFT INTERNAL DIMENSION IN SYSTOLE: 2.29 CM (ref 2.1–4)
LEFT VENTRICLE DIASTOLIC VOLUME INDEX: 34.31 ML/M2
LEFT VENTRICLE DIASTOLIC VOLUME: 57.61 ML
LEFT VENTRICLE MASS INDEX: 105 G/M2
LEFT VENTRICLE SYSTOLIC VOLUME INDEX: 10.7 ML/M2
LEFT VENTRICLE SYSTOLIC VOLUME: 17.95 ML
LEFT VENTRICULAR INTERNAL DIMENSION IN DIASTOLE: 3.69 CM (ref 3.5–6)
LEFT VENTRICULAR MASS: 175.88 G
MV PEAK A VEL: 0.82 M/S
MV PEAK E VEL: 0.79 M/S
OHS CV CPX 1 MINUTE RECOVERY HEART RATE: 110 BPM
OHS CV CPX 85 PERCENT MAX PREDICTED HEART RATE MALE: 120
OHS CV CPX ESTIMATED METS: 7
OHS CV CPX MAX PREDICTED HEART RATE: 141
OHS CV CPX PATIENT IS FEMALE: 1
OHS CV CPX PATIENT IS MALE: 0
OHS CV CPX PEAK DIASTOLIC BLOOD PRESSURE: 93 MMHG
OHS CV CPX PEAK HEAR RATE: 146 BPM
OHS CV CPX PEAK RATE PRESSURE PRODUCT: NORMAL
OHS CV CPX PEAK SYSTOLIC BLOOD PRESSURE: 192 MMHG
OHS CV CPX PERCENT MAX PREDICTED HEART RATE ACHIEVED: 104
OHS CV CPX RATE PRESSURE PRODUCT PRESENTING: NORMAL
PULM VEIN S/D RATIO: 1.55
PV PEAK D VEL: 0.22 M/S
PV PEAK S VEL: 0.34 M/S
RA MAJOR: 3.97 CM
RA PRESSURE: 3 MMHG
RA WIDTH: 3.17 CM
RIGHT VENTRICULAR END-DIASTOLIC DIMENSION: 2.6 CM
STJ: 2.9 CM
STRESS ANGINA INDEX: 0
STRESS ECHO POST EXERCISE DUR MIN: 5 MINUTES
STRESS ECHO POST EXERCISE DUR SEC: 10 SECONDS
SYSTOLIC BLOOD PRESSURE: 145 MMHG
TRICUSPID ANNULAR PLANE SYSTOLIC EXCURSION: 1.87 CM

## 2019-10-31 NOTE — TELEPHONE ENCOUNTER
Stress ECG and stress echo show no old MI and normal LVEF and LVH and no ischemia.   Pt reassured    Pulmonary doctor referred pt back to GI due to dysphagia and possible aspiration.    Mildly elevated Ca level is stable.  Will check PTH

## 2019-11-18 ENCOUNTER — LAB VISIT (OUTPATIENT)
Dept: LAB | Facility: HOSPITAL | Age: 74
End: 2019-11-18
Attending: INTERNAL MEDICINE
Payer: MEDICARE

## 2019-11-18 ENCOUNTER — OFFICE VISIT (OUTPATIENT)
Dept: CARDIOLOGY | Facility: CLINIC | Age: 74
End: 2019-11-18
Payer: MEDICARE

## 2019-11-18 VITALS
HEIGHT: 65 IN | WEIGHT: 137.38 LBS | DIASTOLIC BLOOD PRESSURE: 80 MMHG | HEART RATE: 77 BPM | SYSTOLIC BLOOD PRESSURE: 134 MMHG | BODY MASS INDEX: 22.89 KG/M2

## 2019-11-18 DIAGNOSIS — Z85.89 HISTORY OF SQUAMOUS CELL CARCINOMA: ICD-10-CM

## 2019-11-18 DIAGNOSIS — R06.09 DYSPNEA ON EXERTION: ICD-10-CM

## 2019-11-18 DIAGNOSIS — I49.1 PREMATURE ATRIAL CONTRACTIONS: ICD-10-CM

## 2019-11-18 DIAGNOSIS — J41.0 SIMPLE CHRONIC BRONCHITIS: ICD-10-CM

## 2019-11-18 DIAGNOSIS — I35.1 AORTIC VALVE INSUFFICIENCY, ETIOLOGY OF CARDIAC VALVE DISEASE UNSPECIFIED: ICD-10-CM

## 2019-11-18 DIAGNOSIS — R13.19 OTHER DYSPHAGIA: ICD-10-CM

## 2019-11-18 DIAGNOSIS — I10 ESSENTIAL HYPERTENSION: Primary | ICD-10-CM

## 2019-11-18 DIAGNOSIS — I10 ESSENTIAL HYPERTENSION: ICD-10-CM

## 2019-11-18 LAB
ANION GAP SERPL CALC-SCNC: 10 MMOL/L (ref 8–16)
BUN SERPL-MCNC: 12 MG/DL (ref 8–23)
CALCIUM SERPL-MCNC: 9.5 MG/DL (ref 8.7–10.5)
CHLORIDE SERPL-SCNC: 112 MMOL/L (ref 95–110)
CO2 SERPL-SCNC: 21 MMOL/L (ref 23–29)
CREAT SERPL-MCNC: 1 MG/DL (ref 0.5–1.4)
EST. GFR  (AFRICAN AMERICAN): >60 ML/MIN/1.73 M^2
EST. GFR  (NON AFRICAN AMERICAN): 56 ML/MIN/1.73 M^2
GLUCOSE SERPL-MCNC: 73 MG/DL (ref 70–110)
POTASSIUM SERPL-SCNC: 4.1 MMOL/L (ref 3.5–5.1)
SODIUM SERPL-SCNC: 143 MMOL/L (ref 136–145)

## 2019-11-18 PROCEDURE — 1101F PT FALLS ASSESS-DOCD LE1/YR: CPT | Mod: CPTII,S$GLB,, | Performed by: INTERNAL MEDICINE

## 2019-11-18 PROCEDURE — 99213 PR OFFICE/OUTPT VISIT, EST, LEVL III, 20-29 MIN: ICD-10-PCS | Mod: S$GLB,,, | Performed by: INTERNAL MEDICINE

## 2019-11-18 PROCEDURE — 80048 BASIC METABOLIC PNL TOTAL CA: CPT

## 2019-11-18 PROCEDURE — 3075F SYST BP GE 130 - 139MM HG: CPT | Mod: CPTII,S$GLB,, | Performed by: INTERNAL MEDICINE

## 2019-11-18 PROCEDURE — 3079F PR MOST RECENT DIASTOLIC BLOOD PRESSURE 80-89 MM HG: ICD-10-PCS | Mod: CPTII,S$GLB,, | Performed by: INTERNAL MEDICINE

## 2019-11-18 PROCEDURE — 99999 PR PBB SHADOW E&M-EST. PATIENT-LVL III: ICD-10-PCS | Mod: PBBFAC,,, | Performed by: INTERNAL MEDICINE

## 2019-11-18 PROCEDURE — 99999 PR PBB SHADOW E&M-EST. PATIENT-LVL III: CPT | Mod: PBBFAC,,, | Performed by: INTERNAL MEDICINE

## 2019-11-18 PROCEDURE — 1101F PR PT FALLS ASSESS DOC 0-1 FALLS W/OUT INJ PAST YR: ICD-10-PCS | Mod: CPTII,S$GLB,, | Performed by: INTERNAL MEDICINE

## 2019-11-18 PROCEDURE — 3079F DIAST BP 80-89 MM HG: CPT | Mod: CPTII,S$GLB,, | Performed by: INTERNAL MEDICINE

## 2019-11-18 PROCEDURE — 99213 OFFICE O/P EST LOW 20 MIN: CPT | Mod: S$GLB,,, | Performed by: INTERNAL MEDICINE

## 2019-11-18 PROCEDURE — 36415 COLL VENOUS BLD VENIPUNCTURE: CPT

## 2019-11-18 PROCEDURE — 3075F PR MOST RECENT SYSTOLIC BLOOD PRESS GE 130-139MM HG: ICD-10-PCS | Mod: CPTII,S$GLB,, | Performed by: INTERNAL MEDICINE

## 2019-11-18 NOTE — PROGRESS NOTES
"  Subjective:      Patient ID: Autumn Harris is a 74 y.o. female.    Chief Complaint: Hypertension (Blood pressure follow up)    HPI:   Feels well.    No side effects from spironolactone    Review of Systems   Cardiovascular: Positive for dyspnea on exertion. Negative for chest pain, claudication, irregular heartbeat, leg swelling, near-syncope, orthopnea, palpitations and syncope.      Dr Natanael Hernandez ordered a nebulizer.    "I can't swallow normally."    "I may need my esophagus stretched."    Pt last had EGD 2 years ago.    Pt is scheduled to see Dr Sunshine for EGD    Past Medical History:   Diagnosis Date    Anxiety     COPD (chronic obstructive pulmonary disease)     Depression     Encounter for blood transfusion     Fibromyalgia     High cholesterol     Hypertension     Oral cancer     Sore throat 7/3/2019    Urinary tract infection         Past Surgical History:   Procedure Laterality Date    ABDOMINAL SURGERY      [facility]    HYSTERECTOMY      OOPHORECTOMY      only one removed    Stomach Ulcer Surgery         Family History   Problem Relation Age of Onset    Heart disease Mother     Pacemaker/defibrilator Brother     Heart disease Brother     Heart disease Maternal Aunt     Lung cancer Brother        Social History     Socioeconomic History    Marital status:      Spouse name: Not on file    Number of children: Not on file    Years of education: Not on file    Highest education level: Not on file   Occupational History    Not on file   Social Needs    Financial resource strain: Not on file    Food insecurity:     Worry: Not on file     Inability: Not on file    Transportation needs:     Medical: Not on file     Non-medical: Not on file   Tobacco Use    Smoking status: Former Smoker     Packs/day: 1.50     Years: 45.00     Pack years: 67.50    Smokeless tobacco: Never Used    Tobacco comment: 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   Substance and Sexual " Activity    Alcohol use: No    Drug use: Not on file    Sexual activity: Not on file   Lifestyle    Physical activity:     Days per week: Not on file     Minutes per session: Not on file    Stress: Not on file   Relationships    Social connections:     Talks on phone: Not on file     Gets together: Not on file     Attends Taoism service: Not on file     Active member of club or organization: Not on file     Attends meetings of clubs or organizations: Not on file     Relationship status: Not on file   Other Topics Concern    Not on file   Social History Narrative    Not on file       Current Outpatient Medications on File Prior to Visit   Medication Sig Dispense Refill    albuterol (VENTOLIN HFA) 90 mcg/actuation inhaler Inhale 2 puffs into the lungs every 6 (six) hours as needed for Wheezing. Rescue 18 g 0    azelastine (ASTELIN) 137 mcg (0.1 %) nasal spray 2 SPRAY(S) INTO EACH NOSTRIL TWICE A DAY  5    busPIRone (BUSPAR) 5 MG Tab Take 5 mg by mouth 2 (two) times daily.      clorazepate (TRANXENE) 3.75 MG Tab Take 7.5 mg by mouth 2 (two) times daily.       ESTRACE 0.01 % (0.1 mg/gram) vaginal cream       fluticasone (FLONASE) 50 mcg/actuation nasal spray 2 SPRAYS DAILY PER NOSTRIL  0    meclizine (ANTIVERT) 12.5 mg tablet as needed.       montelukast (SINGULAIR) 10 mg tablet Take 10 mg by mouth once daily.  11    pantoprazole (PROTONIX) 40 MG tablet Take 40 mg by mouth once daily.      polyethylene glycol (GLYCOLAX) 17 gram/dose powder MIX 17 G WITH LIQUID EVERY DAY BY ORAL ROUTE FOR 30 DAYS  5    spironolactone (ALDACTONE) 50 MG tablet Take 1 tablet (50 mg total) by mouth once daily. 90 tablet 3    traMADol (ULTRAM) 50 mg tablet Take 50 mg by mouth every 8 (eight) hours as needed for Pain.      VENTOLIN HFA 90 mcg/actuation inhaler Inhale 2 puffs into the lungs as needed.       zolpidem (AMBIEN) 10 mg Tab Take 5 mg by mouth nightly as needed.       No current facility-administered  "medications on file prior to visit.        Review of patient's allergies indicates:   Allergen Reactions    Pcn [penicillins]     Sulfa (sulfonamide antibiotics)      Objective:     Vitals:    11/18/19 1501   BP: 134/80   BP Location: Left arm   Patient Position: Sitting   BP Method: Large (Automatic)   Pulse: 77   Weight: 62.3 kg (137 lb 5.6 oz)   Height: 5' 5" (1.651 m)        Physical Exam   Constitutional: She is oriented to person, place, and time. She appears well-developed and well-nourished.   Eyes: No scleral icterus.   Neck: No JVD present. Carotid bruit is not present.   Cardiovascular: Normal rate and regular rhythm. Exam reveals no gallop.   No murmur heard.  Pulmonary/Chest: Breath sounds normal.   Musculoskeletal: She exhibits no edema.   Neurological: She is alert and oriented to person, place, and time.   Skin: Skin is warm and dry.   Psychiatric: She has a normal mood and affect. Her behavior is normal. Judgment and thought content normal.   Vitals reviewed.     Note recent stress ECG and stress echo WNL.  Note LVEF WNL    Note recent CMP WNL except Ca 10.2  TSH WNL  Lipid profile WNL      Wt up one pound      Assessment:     1. Essential hypertension    2. Premature atrial contractions    3. Aortic valve insufficiency, etiology of cardiac valve disease unspecified    4. Simple chronic bronchitis    5. Dyspnea on exertion    6. Other dysphagia      Plan:   Autumn was seen today for hypertension.    Diagnoses and all orders for this visit:    Essential hypertension  -     Basic metabolic panel; Future    Premature atrial contractions    Aortic valve insufficiency, etiology of cardiac valve disease unspecified    Simple chronic bronchitis    Dyspnea on exertion    Other dysphagia     HBP is controlled with addition of spironolactone    Will check PTH level due to hypercalcemia    Will repeat BMP on spironolactone    Agree with plans for EGD    Same meds    RTC 3 months    Follow up in about 3 months " (around 2/18/2020).

## 2019-11-20 ENCOUNTER — TELEPHONE (OUTPATIENT)
Dept: CARDIOLOGY | Facility: CLINIC | Age: 74
End: 2019-11-20

## 2019-11-20 NOTE — TELEPHONE ENCOUNTER
Message left on voicemail:  Potassium and renal function are OK.  Lab neglected to do PTH level which can be done in the future.

## 2020-03-18 ENCOUNTER — TELEPHONE (OUTPATIENT)
Dept: CARDIOLOGY | Facility: CLINIC | Age: 75
End: 2020-03-18

## 2020-03-18 NOTE — TELEPHONE ENCOUNTER
Reached out to patient to confirm appointment.  Patient doing fine, no issues.  Wishes to reschedule appointment.  Rescheduled appointment date, time and location confirmed with patient.  Patient verbalized understanding

## 2020-07-30 ENCOUNTER — OFFICE VISIT (OUTPATIENT)
Dept: CARDIOLOGY | Facility: CLINIC | Age: 75
End: 2020-07-30
Payer: MEDICARE

## 2020-07-30 VITALS
BODY MASS INDEX: 21.49 KG/M2 | DIASTOLIC BLOOD PRESSURE: 81 MMHG | HEIGHT: 65 IN | HEART RATE: 72 BPM | OXYGEN SATURATION: 100 % | WEIGHT: 129 LBS | SYSTOLIC BLOOD PRESSURE: 115 MMHG

## 2020-07-30 DIAGNOSIS — R13.19 OTHER DYSPHAGIA: ICD-10-CM

## 2020-07-30 DIAGNOSIS — R94.31 NONSPECIFIC ABNORMAL ELECTROCARDIOGRAM (ECG) (EKG): ICD-10-CM

## 2020-07-30 DIAGNOSIS — I49.1 PREMATURE ATRIAL CONTRACTIONS: ICD-10-CM

## 2020-07-30 DIAGNOSIS — R06.09 DYSPNEA ON EXERTION: ICD-10-CM

## 2020-07-30 DIAGNOSIS — I35.1 AORTIC VALVE INSUFFICIENCY, ETIOLOGY OF CARDIAC VALVE DISEASE UNSPECIFIED: ICD-10-CM

## 2020-07-30 DIAGNOSIS — I10 ESSENTIAL HYPERTENSION: Primary | ICD-10-CM

## 2020-07-30 DIAGNOSIS — J42 CHRONIC BRONCHITIS, UNSPECIFIED CHRONIC BRONCHITIS TYPE: ICD-10-CM

## 2020-07-30 PROCEDURE — 93000 EKG 12-LEAD: ICD-10-PCS | Mod: S$GLB,,, | Performed by: INTERNAL MEDICINE

## 2020-07-30 PROCEDURE — 99214 PR OFFICE/OUTPT VISIT, EST, LEVL IV, 30-39 MIN: ICD-10-PCS | Mod: 25,S$GLB,, | Performed by: INTERNAL MEDICINE

## 2020-07-30 PROCEDURE — 3079F PR MOST RECENT DIASTOLIC BLOOD PRESSURE 80-89 MM HG: ICD-10-PCS | Mod: CPTII,S$GLB,, | Performed by: INTERNAL MEDICINE

## 2020-07-30 PROCEDURE — 1101F PT FALLS ASSESS-DOCD LE1/YR: CPT | Mod: CPTII,S$GLB,, | Performed by: INTERNAL MEDICINE

## 2020-07-30 PROCEDURE — 3079F DIAST BP 80-89 MM HG: CPT | Mod: CPTII,S$GLB,, | Performed by: INTERNAL MEDICINE

## 2020-07-30 PROCEDURE — 99999 PR PBB SHADOW E&M-EST. PATIENT-LVL III: CPT | Mod: PBBFAC,,, | Performed by: INTERNAL MEDICINE

## 2020-07-30 PROCEDURE — 3074F PR MOST RECENT SYSTOLIC BLOOD PRESSURE < 130 MM HG: ICD-10-PCS | Mod: CPTII,S$GLB,, | Performed by: INTERNAL MEDICINE

## 2020-07-30 PROCEDURE — 99999 PR PBB SHADOW E&M-EST. PATIENT-LVL III: ICD-10-PCS | Mod: PBBFAC,,, | Performed by: INTERNAL MEDICINE

## 2020-07-30 PROCEDURE — 93000 ELECTROCARDIOGRAM COMPLETE: CPT | Mod: S$GLB,,, | Performed by: INTERNAL MEDICINE

## 2020-07-30 PROCEDURE — 1159F MED LIST DOCD IN RCRD: CPT | Mod: S$GLB,,, | Performed by: INTERNAL MEDICINE

## 2020-07-30 PROCEDURE — 3074F SYST BP LT 130 MM HG: CPT | Mod: CPTII,S$GLB,, | Performed by: INTERNAL MEDICINE

## 2020-07-30 PROCEDURE — 1101F PR PT FALLS ASSESS DOC 0-1 FALLS W/OUT INJ PAST YR: ICD-10-PCS | Mod: CPTII,S$GLB,, | Performed by: INTERNAL MEDICINE

## 2020-07-30 PROCEDURE — 99214 OFFICE O/P EST MOD 30 MIN: CPT | Mod: 25,S$GLB,, | Performed by: INTERNAL MEDICINE

## 2020-07-30 PROCEDURE — 1159F PR MEDICATION LIST DOCUMENTED IN MEDICAL RECORD: ICD-10-PCS | Mod: S$GLB,,, | Performed by: INTERNAL MEDICINE

## 2020-07-30 RX ORDER — PANTOPRAZOLE SODIUM 40 MG/1
40 TABLET, DELAYED RELEASE ORAL DAILY
Qty: 30 TABLET | Refills: 11 | Status: SHIPPED | OUTPATIENT
Start: 2020-07-30 | End: 2021-04-12

## 2020-07-30 RX ORDER — DILTIAZEM HYDROCHLORIDE 120 MG/1
120 TABLET, FILM COATED ORAL DAILY
COMMUNITY
Start: 2020-06-19 | End: 2022-11-09

## 2020-07-30 RX ORDER — HYDROCORTISONE 25 MG/G
CREAM TOPICAL 2 TIMES DAILY PRN
COMMUNITY
Start: 2020-07-08 | End: 2022-11-08

## 2020-07-30 NOTE — PROGRESS NOTES
"  Subjective:      Patient ID: Autumn Harris is a 75 y.o. female.    Chief Complaint: Follow-up    HPI:  Performs housework.    Walks in mall.    Calves hurt after walking a block or so, pain eases with quiet resting.    Chronic shortness of breath with exertion.    Legs "knot up " when in bed.    Review of Systems   Cardiovascular: Positive for claudication and dyspnea on exertion. Negative for chest pain, irregular heartbeat, leg swelling, near-syncope, orthopnea, palpitations and syncope.      Stomach was stretched but pt is still losing weight.  Right after dilatation pt was eating better.  Pt thinks she may have to have it stretched again by Dr Sampson  Bread and meat get stuck when swallowing    Past Medical History:   Diagnosis Date    Anxiety     COPD (chronic obstructive pulmonary disease)     Depression     Encounter for blood transfusion     Fibromyalgia     High cholesterol     Hypertension     Oral cancer     Sore throat 7/3/2019    Urinary tract infection         Past Surgical History:   Procedure Laterality Date    ABDOMINAL SURGERY      [place]    HYSTERECTOMY      OOPHORECTOMY      only one removed    Stomach Ulcer Surgery         Family History   Problem Relation Age of Onset    Heart disease Mother     Pacemaker/defibrilator Brother     Heart disease Brother     Heart disease Maternal Aunt     Lung cancer Brother        Social History     Socioeconomic History    Marital status:      Spouse name: Not on file    Number of children: Not on file    Years of education: Not on file    Highest education level: Not on file   Occupational History    Not on file   Social Needs    Financial resource strain: Not on file    Food insecurity     Worry: Not on file     Inability: Not on file    Transportation needs     Medical: Not on file     Non-medical: Not on file   Tobacco Use    Smoking status: Former Smoker     Packs/day: 1.50     Years: 45.00     Pack years: 67.50    "  Quit date: 1995     Years since quittin.0    Smokeless tobacco: Never Used    Tobacco comment: 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   Substance and Sexual Activity    Alcohol use: No    Drug use: Not on file    Sexual activity: Not on file   Lifestyle    Physical activity     Days per week: Not on file     Minutes per session: Not on file    Stress: Not on file   Relationships    Social connections     Talks on phone: Not on file     Gets together: Not on file     Attends Rest[employer] service: Not on file     Active member of club or organization: Not on file     Attends meetings of clubs or organizations: Not on file     Relationship status: Not on file   Other Topics Concern    Not on file   Social History Narrative    Not on file       Current Outpatient Medications on File Prior to Visit   Medication Sig Dispense Refill    albuterol (VENTOLIN HFA) 90 mcg/actuation inhaler Inhale 2 puffs into the lungs every 6 (six) hours as needed for Wheezing. Rescue 18 g 0    azelastine (ASTELIN) 137 mcg (0.1 %) nasal spray 2 SPRAY(S) INTO EACH NOSTRIL TWICE A DAY  5    busPIRone (BUSPAR) 5 MG Tab Take 5 mg by mouth 2 (two) times daily.      clorazepate (TRANXENE) 3.75 MG Tab Take 7.5 mg by mouth 2 (two) times daily.       diltiaZEM (CARDIZEM) 120 MG tablet Take 120 mg by mouth once daily.      fluticasone (FLONASE) 50 mcg/actuation nasal spray 2 SPRAYS DAILY PER NOSTRIL  0    meclizine (ANTIVERT) 12.5 mg tablet as needed.       montelukast (SINGULAIR) 10 mg tablet Take 10 mg by mouth once daily.  11    polyethylene glycol (GLYCOLAX) 17 gram/dose powder MIX 17 G WITH LIQUID EVERY DAY BY ORAL ROUTE FOR 30 DAYS  5    PROCTOSOL HC 2.5 % rectal cream APPLY CREAM TWICE DAILY RECTAL FOR 30 DAYS      spironolactone (ALDACTONE) 50 MG tablet Take 1 tablet (50 mg total) by mouth once daily. 90 tablet 3    traMADol (ULTRAM) 50 mg tablet Take 50 mg by mouth every 8 (eight) hours as needed for  "Pain.      VENTOLIN HFA 90 mcg/actuation inhaler Inhale 2 puffs into the lungs as needed.       ESTRACE 0.01 % (0.1 mg/gram) vaginal cream       pantoprazole (PROTONIX) 40 MG tablet Take 40 mg by mouth once daily.      zolpidem (AMBIEN) 10 mg Tab Take 5 mg by mouth nightly as needed.       No current facility-administered medications on file prior to visit.        Review of patient's allergies indicates:   Allergen Reactions    Pcn [penicillins]     Sulfa (sulfonamide antibiotics)      Objective:     Vitals:    07/30/20 1542   BP: 115/81   BP Location: Left arm   Patient Position: Sitting   BP Method: Large (Automatic)   Pulse: 72   SpO2: 100%   Weight: 58.5 kg (128 lb 15.5 oz)   Height: 5' 5" (1.651 m)        Physical Exam   Constitutional: She is oriented to person, place, and time. She appears well-developed and well-nourished.   Eyes: No scleral icterus.   Neck: No JVD present. Carotid bruit is not present.   Cardiovascular: Normal rate and regular rhythm. Exam reveals no gallop.   No murmur heard.  Pulses:       Dorsalis pedis pulses are 2+ on the right side and 2+ on the left side.        Posterior tibial pulses are 2+ on the right side and 2+ on the left side.   Pulmonary/Chest: Breath sounds normal.   Musculoskeletal:         General: No edema.   Neurological: She is alert and oriented to person, place, and time.   Skin: Skin is warm and dry.   Psychiatric: She has a normal mood and affect. Her behavior is normal. Judgment and thought content normal.   Vitals reviewed.   wt down 7 lbs    ECG: NSR, anteroseptal infarct, nonspecific STT abnormality    Note CBC and CMP and lipids 10/19 OK except for mild hypercalcemia    Note stress ECG and stress echocardiogram 10/19 were normal.  LVH noted.  LVEF 70%  Assessment:     1. Essential hypertension    2. Nonspecific abnormal electrocardiogram (ECG) (EKG)    3. Premature atrial contractions    4. Aortic valve insufficiency, etiology of cardiac valve disease " unspecified    5. Chronic bronchitis, unspecified chronic bronchitis type    6. Other dysphagia    7. Dyspnea on exertion      Plan:   Autumn was seen today for follow-up.    Diagnoses and all orders for this visit:    Essential hypertension  -     IN OFFICE EKG 12-LEAD (to [facility])    Nonspecific abnormal electrocardiogram (ECG) (EKG)  -     IN OFFICE EKG 12-LEAD (to [facility])    Premature atrial contractions    Aortic valve insufficiency, etiology of cardiac valve disease unspecified  -     IN OFFICE EKG 12-LEAD (to [facility])    Chronic bronchitis, unspecified chronic bronchitis type    Other dysphagia    Dyspnea on exertion  -     IN OFFICE EKG 12-LEAD (to [place])       Pt sams labs scheduled for dr Vega for before her next visit August 2020    HBP is well controlled    Pt instructed to see gastroenterologist again due to dysphagia.  Pt may need to have her esophagus dilated again.  In the meantime will resume the pantoprazole.    Rest of meds the same    The leg pain when walking could be due to spinal stenosis    Follow up in about 6 months (around 1/30/2021).

## 2020-10-13 RX ORDER — SPIRONOLACTONE 50 MG/1
50 TABLET, FILM COATED ORAL DAILY
Qty: 90 TABLET | Refills: 3 | Status: SHIPPED | OUTPATIENT
Start: 2020-10-13 | End: 2021-04-06

## 2021-03-02 ENCOUNTER — TELEPHONE (OUTPATIENT)
Dept: CARDIOLOGY | Facility: CLINIC | Age: 76
End: 2021-03-02

## 2021-03-08 ENCOUNTER — LAB VISIT (OUTPATIENT)
Dept: LAB | Facility: HOSPITAL | Age: 76
End: 2021-03-08
Attending: STUDENT IN AN ORGANIZED HEALTH CARE EDUCATION/TRAINING PROGRAM
Payer: MEDICARE

## 2021-03-08 DIAGNOSIS — M89.8X9 METABOLIC BONE DISEASE: ICD-10-CM

## 2021-03-08 DIAGNOSIS — E55.9 VITAMIN D DEFICIENCY: ICD-10-CM

## 2021-03-08 DIAGNOSIS — N18.31 STAGE 3A CHRONIC KIDNEY DISEASE: ICD-10-CM

## 2021-03-08 LAB
ALBUMIN SERPL BCP-MCNC: 4.1 G/DL (ref 3.5–5.2)
ANION GAP SERPL CALC-SCNC: 13 MMOL/L (ref 8–16)
BUN SERPL-MCNC: 20 MG/DL (ref 8–23)
CALCIUM SERPL-MCNC: 9.9 MG/DL (ref 8.7–10.5)
CHLORIDE SERPL-SCNC: 110 MMOL/L (ref 95–110)
CK SERPL-CCNC: 59 U/L (ref 20–180)
CO2 SERPL-SCNC: 19 MMOL/L (ref 23–29)
CREAT SERPL-MCNC: 1.4 MG/DL (ref 0.5–1.4)
EST. GFR  (AFRICAN AMERICAN): 42 ML/MIN/1.73 M^2
EST. GFR  (NON AFRICAN AMERICAN): 37 ML/MIN/1.73 M^2
GLUCOSE SERPL-MCNC: 68 MG/DL (ref 70–110)
PHOSPHATE SERPL-MCNC: 3.3 MG/DL (ref 2.7–4.5)
POTASSIUM SERPL-SCNC: 4.5 MMOL/L (ref 3.5–5.1)
PTH-INTACT SERPL-MCNC: 244.5 PG/ML (ref 9–77)
SODIUM SERPL-SCNC: 142 MMOL/L (ref 136–145)
URATE SERPL-MCNC: 6.3 MG/DL (ref 2.4–5.7)

## 2021-03-08 PROCEDURE — 84550 ASSAY OF BLOOD/URIC ACID: CPT | Performed by: STUDENT IN AN ORGANIZED HEALTH CARE EDUCATION/TRAINING PROGRAM

## 2021-03-08 PROCEDURE — 82550 ASSAY OF CK (CPK): CPT | Performed by: STUDENT IN AN ORGANIZED HEALTH CARE EDUCATION/TRAINING PROGRAM

## 2021-03-08 PROCEDURE — 86704 HEP B CORE ANTIBODY TOTAL: CPT | Performed by: STUDENT IN AN ORGANIZED HEALTH CARE EDUCATION/TRAINING PROGRAM

## 2021-03-08 PROCEDURE — 83970 ASSAY OF PARATHORMONE: CPT | Performed by: STUDENT IN AN ORGANIZED HEALTH CARE EDUCATION/TRAINING PROGRAM

## 2021-03-08 PROCEDURE — 82306 VITAMIN D 25 HYDROXY: CPT | Performed by: STUDENT IN AN ORGANIZED HEALTH CARE EDUCATION/TRAINING PROGRAM

## 2021-03-08 PROCEDURE — 87522 HEPATITIS C REVRS TRNSCRPJ: CPT | Performed by: STUDENT IN AN ORGANIZED HEALTH CARE EDUCATION/TRAINING PROGRAM

## 2021-03-08 PROCEDURE — 36415 COLL VENOUS BLD VENIPUNCTURE: CPT | Performed by: STUDENT IN AN ORGANIZED HEALTH CARE EDUCATION/TRAINING PROGRAM

## 2021-03-08 PROCEDURE — 87340 HEPATITIS B SURFACE AG IA: CPT | Performed by: STUDENT IN AN ORGANIZED HEALTH CARE EDUCATION/TRAINING PROGRAM

## 2021-03-08 PROCEDURE — 80069 RENAL FUNCTION PANEL: CPT | Performed by: STUDENT IN AN ORGANIZED HEALTH CARE EDUCATION/TRAINING PROGRAM

## 2021-03-09 LAB
25(OH)D3+25(OH)D2 SERPL-MCNC: 15 NG/ML (ref 30–96)
HBV SURFACE AG SERPL QL IA: NEGATIVE

## 2021-03-10 LAB — HBV CORE AB SERPL QL IA: NEGATIVE

## 2021-03-11 LAB
HCV RNA SERPL NAA+PROBE-LOG IU: <1.08 LOG (10) IU/ML
HCV RNA SERPL QL NAA+PROBE: NOT DETECTED IU/ML
HCV RNA SPEC NAA+PROBE-ACNC: <12 IU/ML

## 2021-03-15 ENCOUNTER — HOSPITAL ENCOUNTER (OUTPATIENT)
Dept: RADIOLOGY | Facility: HOSPITAL | Age: 76
Discharge: HOME | End: 2021-03-15
Attending: STUDENT IN AN ORGANIZED HEALTH CARE EDUCATION/TRAINING PROGRAM
Payer: MEDICARE

## 2021-03-15 DIAGNOSIS — N18.31 STAGE 3A CHRONIC KIDNEY DISEASE: ICD-10-CM

## 2021-03-15 PROCEDURE — 76770 US EXAM ABDO BACK WALL COMP: CPT | Mod: TC

## 2021-03-15 PROCEDURE — 76770 US RETROPERITONEAL COMPLETE: ICD-10-PCS | Mod: 26,,, | Performed by: RADIOLOGY

## 2021-03-15 PROCEDURE — 76770 US EXAM ABDO BACK WALL COMP: CPT | Mod: 26,,, | Performed by: RADIOLOGY

## 2021-04-28 ENCOUNTER — OFFICE VISIT (OUTPATIENT)
Dept: UROLOGY | Facility: CLINIC | Age: 76
End: 2021-04-28
Payer: MEDICARE

## 2021-04-28 ENCOUNTER — LAB VISIT (OUTPATIENT)
Dept: LAB | Facility: HOSPITAL | Age: 76
End: 2021-04-28
Attending: NURSE PRACTITIONER
Payer: MEDICARE

## 2021-04-28 VITALS
BODY MASS INDEX: 24.65 KG/M2 | HEART RATE: 58 BPM | WEIGHT: 139.13 LBS | HEIGHT: 63 IN | DIASTOLIC BLOOD PRESSURE: 74 MMHG | SYSTOLIC BLOOD PRESSURE: 125 MMHG

## 2021-04-28 DIAGNOSIS — N30.01 ACUTE CYSTITIS WITH HEMATURIA: ICD-10-CM

## 2021-04-28 DIAGNOSIS — R30.0 DYSURIA: Primary | ICD-10-CM

## 2021-04-28 DIAGNOSIS — R30.0 DYSURIA: ICD-10-CM

## 2021-04-28 DIAGNOSIS — R35.0 URINARY FREQUENCY: ICD-10-CM

## 2021-04-28 LAB
BILIRUB UR QL STRIP: NEGATIVE
CLARITY UR REFRACT.AUTO: CLEAR
COLOR UR AUTO: YELLOW
GLUCOSE UR QL STRIP: NEGATIVE
HGB UR QL STRIP: NEGATIVE
KETONES UR QL STRIP: NEGATIVE
LEUKOCYTE ESTERASE UR QL STRIP: NEGATIVE
MICROSCOPIC COMMENT: NORMAL
NITRITE UR QL STRIP: NEGATIVE
PH UR STRIP: 5 [PH] (ref 5–8)
PROT UR QL STRIP: NEGATIVE
SP GR UR STRIP: 1.01 (ref 1–1.03)
URN SPEC COLLECT METH UR: NORMAL
UROBILINOGEN UR STRIP-ACNC: NEGATIVE EU/DL

## 2021-04-28 PROCEDURE — 81000 URINALYSIS NONAUTO W/SCOPE: CPT | Performed by: NURSE PRACTITIONER

## 2021-04-28 PROCEDURE — 51798 PR MEAS,POST-VOID RES,US,NON-IMAGING: ICD-10-PCS | Mod: S$GLB,,, | Performed by: NURSE PRACTITIONER

## 2021-04-28 PROCEDURE — 1125F PR PAIN SEVERITY QUANTIFIED, PAIN PRESENT: ICD-10-PCS | Mod: S$GLB,,, | Performed by: NURSE PRACTITIONER

## 2021-04-28 PROCEDURE — 1101F PT FALLS ASSESS-DOCD LE1/YR: CPT | Mod: CPTII,S$GLB,, | Performed by: NURSE PRACTITIONER

## 2021-04-28 PROCEDURE — 1159F PR MEDICATION LIST DOCUMENTED IN MEDICAL RECORD: ICD-10-PCS | Mod: S$GLB,,, | Performed by: NURSE PRACTITIONER

## 2021-04-28 PROCEDURE — 99204 OFFICE O/P NEW MOD 45 MIN: CPT | Mod: S$GLB,,, | Performed by: NURSE PRACTITIONER

## 2021-04-28 PROCEDURE — 3288F PR FALLS RISK ASSESSMENT DOCUMENTED: ICD-10-PCS | Mod: CPTII,S$GLB,, | Performed by: NURSE PRACTITIONER

## 2021-04-28 PROCEDURE — 51798 US URINE CAPACITY MEASURE: CPT | Mod: S$GLB,,, | Performed by: NURSE PRACTITIONER

## 2021-04-28 PROCEDURE — 1101F PR PT FALLS ASSESS DOC 0-1 FALLS W/OUT INJ PAST YR: ICD-10-PCS | Mod: CPTII,S$GLB,, | Performed by: NURSE PRACTITIONER

## 2021-04-28 PROCEDURE — 1125F AMNT PAIN NOTED PAIN PRSNT: CPT | Mod: S$GLB,,, | Performed by: NURSE PRACTITIONER

## 2021-04-28 PROCEDURE — 99999 PR PBB SHADOW E&M-EST. PATIENT-LVL IV: ICD-10-PCS | Mod: PBBFAC,,, | Performed by: NURSE PRACTITIONER

## 2021-04-28 PROCEDURE — 99999 PR PBB SHADOW E&M-EST. PATIENT-LVL IV: CPT | Mod: PBBFAC,,, | Performed by: NURSE PRACTITIONER

## 2021-04-28 PROCEDURE — 99204 PR OFFICE/OUTPT VISIT, NEW, LEVL IV, 45-59 MIN: ICD-10-PCS | Mod: S$GLB,,, | Performed by: NURSE PRACTITIONER

## 2021-04-28 PROCEDURE — 87086 URINE CULTURE/COLONY COUNT: CPT | Performed by: NURSE PRACTITIONER

## 2021-04-28 PROCEDURE — 3288F FALL RISK ASSESSMENT DOCD: CPT | Mod: CPTII,S$GLB,, | Performed by: NURSE PRACTITIONER

## 2021-04-28 PROCEDURE — 1159F MED LIST DOCD IN RCRD: CPT | Mod: S$GLB,,, | Performed by: NURSE PRACTITIONER

## 2021-04-28 RX ORDER — FLUCONAZOLE 150 MG/1
150 TABLET ORAL ONCE
Qty: 1 TABLET | Refills: 1 | Status: SHIPPED | OUTPATIENT
Start: 2021-04-28 | End: 2021-04-28

## 2021-04-29 LAB — BACTERIA UR CULT: NO GROWTH

## 2021-04-30 ENCOUNTER — TELEPHONE (OUTPATIENT)
Dept: UROLOGY | Facility: CLINIC | Age: 76
End: 2021-04-30

## 2021-06-27 ENCOUNTER — HOSPITAL ENCOUNTER (EMERGENCY)
Facility: HOSPITAL | Age: 76
Discharge: HOME | End: 2021-06-27
Attending: EMERGENCY MEDICINE
Payer: MEDICARE

## 2021-06-27 VITALS
TEMPERATURE: 98 F | RESPIRATION RATE: 18 BRPM | BODY MASS INDEX: 25.69 KG/M2 | DIASTOLIC BLOOD PRESSURE: 86 MMHG | HEART RATE: 89 BPM | SYSTOLIC BLOOD PRESSURE: 171 MMHG | WEIGHT: 145 LBS | OXYGEN SATURATION: 98 %

## 2021-06-27 DIAGNOSIS — R55 SYNCOPE: ICD-10-CM

## 2021-06-27 DIAGNOSIS — R41.82 ALTERED MENTAL STATUS: ICD-10-CM

## 2021-06-27 DIAGNOSIS — R41.0 TRANSIENT CONFUSION: Primary | ICD-10-CM

## 2021-06-27 LAB
ALBUMIN SERPL BCP-MCNC: 4.2 G/DL (ref 3.5–5.2)
ALP SERPL-CCNC: 101 U/L (ref 55–135)
ALT SERPL W/O P-5'-P-CCNC: 10 U/L (ref 10–44)
ANION GAP SERPL CALC-SCNC: 14 MMOL/L (ref 8–16)
AST SERPL-CCNC: 17 U/L (ref 10–40)
BASOPHILS # BLD AUTO: 0.1 K/UL (ref 0–0.2)
BASOPHILS NFR BLD: 1 % (ref 0–1.9)
BILIRUB SERPL-MCNC: 0.4 MG/DL (ref 0.1–1)
BILIRUB UR QL STRIP: NEGATIVE
BNP SERPL-MCNC: 296 PG/ML (ref 0–99)
BUN SERPL-MCNC: 19 MG/DL (ref 8–23)
CALCIUM SERPL-MCNC: 9.8 MG/DL (ref 8.7–10.5)
CHLORIDE SERPL-SCNC: 106 MMOL/L (ref 95–110)
CLARITY UR: CLEAR
CO2 SERPL-SCNC: 17 MMOL/L (ref 23–29)
COLOR UR: COLORLESS
CREAT SERPL-MCNC: 1.1 MG/DL (ref 0.5–1.4)
DIFFERENTIAL METHOD: ABNORMAL
EOSINOPHIL # BLD AUTO: 0 K/UL (ref 0–0.5)
EOSINOPHIL NFR BLD: 0.4 % (ref 0–8)
ERYTHROCYTE [DISTWIDTH] IN BLOOD BY AUTOMATED COUNT: 13.7 % (ref 11.5–14.5)
EST. GFR  (AFRICAN AMERICAN): 57 ML/MIN/1.73 M^2
EST. GFR  (NON AFRICAN AMERICAN): 49 ML/MIN/1.73 M^2
GLUCOSE SERPL-MCNC: 103 MG/DL (ref 70–110)
GLUCOSE UR QL STRIP: NEGATIVE
HCT VFR BLD AUTO: 42.1 % (ref 37–48.5)
HGB BLD-MCNC: 14 G/DL (ref 12–16)
HGB UR QL STRIP: NEGATIVE
IMM GRANULOCYTES # BLD AUTO: 0.08 K/UL (ref 0–0.04)
IMM GRANULOCYTES NFR BLD AUTO: 0.8 % (ref 0–0.5)
KETONES UR QL STRIP: NEGATIVE
LACTATE SERPL-SCNC: 1.4 MMOL/L (ref 0.5–2.2)
LEUKOCYTE ESTERASE UR QL STRIP: NEGATIVE
LYMPHOCYTES # BLD AUTO: 1.5 K/UL (ref 1–4.8)
LYMPHOCYTES NFR BLD: 14.9 % (ref 18–48)
MAGNESIUM SERPL-MCNC: 1.8 MG/DL (ref 1.6–2.6)
MCH RBC QN AUTO: 28 PG (ref 27–31)
MCHC RBC AUTO-ENTMCNC: 33.3 G/DL (ref 32–36)
MCV RBC AUTO: 84 FL (ref 82–98)
MONOCYTES # BLD AUTO: 0.6 K/UL (ref 0.3–1)
MONOCYTES NFR BLD: 6.4 % (ref 4–15)
NEUTROPHILS # BLD AUTO: 7.6 K/UL (ref 1.8–7.7)
NEUTROPHILS NFR BLD: 76.5 % (ref 38–73)
NITRITE UR QL STRIP: NEGATIVE
NRBC BLD-RTO: 0 /100 WBC
PH UR STRIP: 5 [PH] (ref 5–8)
PLATELET # BLD AUTO: 241 K/UL (ref 150–450)
PMV BLD AUTO: 10.4 FL (ref 9.2–12.9)
POCT GLUCOSE: 118 MG/DL (ref 70–110)
POTASSIUM SERPL-SCNC: 3.8 MMOL/L (ref 3.5–5.1)
PROT SERPL-MCNC: 7.5 G/DL (ref 6–8.4)
PROT UR QL STRIP: NEGATIVE
RBC # BLD AUTO: 5 M/UL (ref 4–5.4)
SODIUM SERPL-SCNC: 137 MMOL/L (ref 136–145)
SP GR UR STRIP: 1 (ref 1–1.03)
T4 FREE SERPL-MCNC: 0.89 NG/DL (ref 0.71–1.51)
TROPONIN I SERPL DL<=0.01 NG/ML-MCNC: 0.01 NG/ML (ref 0–0.03)
TSH SERPL DL<=0.005 MIU/L-ACNC: 0.24 UIU/ML (ref 0.4–4)
URN SPEC COLLECT METH UR: ABNORMAL
UROBILINOGEN UR STRIP-ACNC: NEGATIVE EU/DL
WBC # BLD AUTO: 9.97 K/UL (ref 3.9–12.7)

## 2021-06-27 PROCEDURE — 93010 EKG 12-LEAD: ICD-10-PCS | Mod: ,,, | Performed by: INTERNAL MEDICINE

## 2021-06-27 PROCEDURE — 85025 COMPLETE CBC W/AUTO DIFF WBC: CPT | Performed by: EMERGENCY MEDICINE

## 2021-06-27 PROCEDURE — 63600175 PHARM REV CODE 636 W HCPCS: Performed by: EMERGENCY MEDICINE

## 2021-06-27 PROCEDURE — 96375 TX/PRO/DX INJ NEW DRUG ADDON: CPT | Mod: 59

## 2021-06-27 PROCEDURE — 99285 EMERGENCY DEPT VISIT HI MDM: CPT | Mod: 25

## 2021-06-27 PROCEDURE — 93005 ELECTROCARDIOGRAM TRACING: CPT

## 2021-06-27 PROCEDURE — 83605 ASSAY OF LACTIC ACID: CPT | Performed by: EMERGENCY MEDICINE

## 2021-06-27 PROCEDURE — 25000003 PHARM REV CODE 250: Performed by: EMERGENCY MEDICINE

## 2021-06-27 PROCEDURE — 84484 ASSAY OF TROPONIN QUANT: CPT | Performed by: EMERGENCY MEDICINE

## 2021-06-27 PROCEDURE — 83735 ASSAY OF MAGNESIUM: CPT | Performed by: EMERGENCY MEDICINE

## 2021-06-27 PROCEDURE — 84443 ASSAY THYROID STIM HORMONE: CPT | Performed by: EMERGENCY MEDICINE

## 2021-06-27 PROCEDURE — 84439 ASSAY OF FREE THYROXINE: CPT | Performed by: EMERGENCY MEDICINE

## 2021-06-27 PROCEDURE — 83880 ASSAY OF NATRIURETIC PEPTIDE: CPT | Performed by: EMERGENCY MEDICINE

## 2021-06-27 PROCEDURE — 51701 INSERT BLADDER CATHETER: CPT

## 2021-06-27 PROCEDURE — 96374 THER/PROPH/DIAG INJ IV PUSH: CPT | Mod: 59

## 2021-06-27 PROCEDURE — 82962 GLUCOSE BLOOD TEST: CPT

## 2021-06-27 PROCEDURE — 93010 ELECTROCARDIOGRAM REPORT: CPT | Mod: ,,, | Performed by: INTERNAL MEDICINE

## 2021-06-27 PROCEDURE — 81003 URINALYSIS AUTO W/O SCOPE: CPT | Performed by: EMERGENCY MEDICINE

## 2021-06-27 PROCEDURE — 80053 COMPREHEN METABOLIC PANEL: CPT | Performed by: EMERGENCY MEDICINE

## 2021-06-27 RX ORDER — HYDRALAZINE HYDROCHLORIDE 20 MG/ML
5 INJECTION INTRAMUSCULAR; INTRAVENOUS
Status: COMPLETED | OUTPATIENT
Start: 2021-06-27 | End: 2021-06-27

## 2021-06-27 RX ORDER — ACETAMINOPHEN 500 MG
1000 TABLET ORAL
Status: COMPLETED | OUTPATIENT
Start: 2021-06-27 | End: 2021-06-27

## 2021-06-27 RX ORDER — ONDANSETRON 2 MG/ML
4 INJECTION INTRAMUSCULAR; INTRAVENOUS
Status: COMPLETED | OUTPATIENT
Start: 2021-06-27 | End: 2021-06-27

## 2021-06-27 RX ADMIN — ACETAMINOPHEN 1000 MG: 500 TABLET ORAL at 05:06

## 2021-06-27 RX ADMIN — ONDANSETRON 4 MG: 2 INJECTION INTRAMUSCULAR; INTRAVENOUS at 04:06

## 2021-06-27 RX ADMIN — HYDRALAZINE HYDROCHLORIDE 5 MG: 20 INJECTION, SOLUTION INTRAMUSCULAR; INTRAVENOUS at 05:06

## 2021-07-13 ENCOUNTER — OFFICE VISIT (OUTPATIENT)
Dept: CARDIOLOGY | Facility: CLINIC | Age: 76
End: 2021-07-13
Payer: MEDICARE

## 2021-07-13 VITALS
SYSTOLIC BLOOD PRESSURE: 121 MMHG | BODY MASS INDEX: 25.08 KG/M2 | OXYGEN SATURATION: 95 % | DIASTOLIC BLOOD PRESSURE: 68 MMHG | HEART RATE: 63 BPM | HEIGHT: 63 IN | WEIGHT: 141.56 LBS

## 2021-07-13 DIAGNOSIS — R94.31 NONSPECIFIC ABNORMAL ELECTROCARDIOGRAM (ECG) (EKG): ICD-10-CM

## 2021-07-13 DIAGNOSIS — R07.9 CHEST PAIN OF UNCERTAIN ETIOLOGY: ICD-10-CM

## 2021-07-13 DIAGNOSIS — I49.1 PREMATURE ATRIAL CONTRACTIONS: ICD-10-CM

## 2021-07-13 DIAGNOSIS — I65.23 CAROTID ARTERY PLAQUE, BILATERAL: ICD-10-CM

## 2021-07-13 DIAGNOSIS — I10 ESSENTIAL HYPERTENSION: ICD-10-CM

## 2021-07-13 DIAGNOSIS — K21.9 GASTROESOPHAGEAL REFLUX DISEASE WITHOUT ESOPHAGITIS: ICD-10-CM

## 2021-07-13 DIAGNOSIS — R06.09 DYSPNEA ON EXERTION: ICD-10-CM

## 2021-07-13 DIAGNOSIS — R55 SYNCOPE, UNSPECIFIED SYNCOPE TYPE: Primary | ICD-10-CM

## 2021-07-13 DIAGNOSIS — I35.1 AORTIC VALVE INSUFFICIENCY, ETIOLOGY OF CARDIAC VALVE DISEASE UNSPECIFIED: ICD-10-CM

## 2021-07-13 PROCEDURE — 1101F PR PT FALLS ASSESS DOC 0-1 FALLS W/OUT INJ PAST YR: ICD-10-PCS | Mod: CPTII,S$GLB,, | Performed by: INTERNAL MEDICINE

## 2021-07-13 PROCEDURE — 1159F MED LIST DOCD IN RCRD: CPT | Mod: S$GLB,,, | Performed by: INTERNAL MEDICINE

## 2021-07-13 PROCEDURE — 99999 PR PBB SHADOW E&M-EST. PATIENT-LVL III: CPT | Mod: PBBFAC,,, | Performed by: INTERNAL MEDICINE

## 2021-07-13 PROCEDURE — 1159F PR MEDICATION LIST DOCUMENTED IN MEDICAL RECORD: ICD-10-PCS | Mod: S$GLB,,, | Performed by: INTERNAL MEDICINE

## 2021-07-13 PROCEDURE — 1126F PR PAIN SEVERITY QUANTIFIED, NO PAIN PRESENT: ICD-10-PCS | Mod: S$GLB,,, | Performed by: INTERNAL MEDICINE

## 2021-07-13 PROCEDURE — 3288F PR FALLS RISK ASSESSMENT DOCUMENTED: ICD-10-PCS | Mod: CPTII,S$GLB,, | Performed by: INTERNAL MEDICINE

## 2021-07-13 PROCEDURE — 99215 OFFICE O/P EST HI 40 MIN: CPT | Mod: 25,S$GLB,, | Performed by: INTERNAL MEDICINE

## 2021-07-13 PROCEDURE — 3074F PR MOST RECENT SYSTOLIC BLOOD PRESSURE < 130 MM HG: ICD-10-PCS | Mod: CPTII,S$GLB,, | Performed by: INTERNAL MEDICINE

## 2021-07-13 PROCEDURE — 1101F PT FALLS ASSESS-DOCD LE1/YR: CPT | Mod: CPTII,S$GLB,, | Performed by: INTERNAL MEDICINE

## 2021-07-13 PROCEDURE — 3078F DIAST BP <80 MM HG: CPT | Mod: CPTII,S$GLB,, | Performed by: INTERNAL MEDICINE

## 2021-07-13 PROCEDURE — 3288F FALL RISK ASSESSMENT DOCD: CPT | Mod: CPTII,S$GLB,, | Performed by: INTERNAL MEDICINE

## 2021-07-13 PROCEDURE — 99215 PR OFFICE/OUTPT VISIT, EST, LEVL V, 40-54 MIN: ICD-10-PCS | Mod: 25,S$GLB,, | Performed by: INTERNAL MEDICINE

## 2021-07-13 PROCEDURE — 93000 ELECTROCARDIOGRAM COMPLETE: CPT | Mod: S$GLB,,, | Performed by: INTERNAL MEDICINE

## 2021-07-13 PROCEDURE — 99999 PR PBB SHADOW E&M-EST. PATIENT-LVL III: ICD-10-PCS | Mod: PBBFAC,,, | Performed by: INTERNAL MEDICINE

## 2021-07-13 PROCEDURE — 93000 EKG 12-LEAD: ICD-10-PCS | Mod: S$GLB,,, | Performed by: INTERNAL MEDICINE

## 2021-07-13 PROCEDURE — 1126F AMNT PAIN NOTED NONE PRSNT: CPT | Mod: S$GLB,,, | Performed by: INTERNAL MEDICINE

## 2021-07-13 PROCEDURE — 3078F PR MOST RECENT DIASTOLIC BLOOD PRESSURE < 80 MM HG: ICD-10-PCS | Mod: CPTII,S$GLB,, | Performed by: INTERNAL MEDICINE

## 2021-07-13 PROCEDURE — 3074F SYST BP LT 130 MM HG: CPT | Mod: CPTII,S$GLB,, | Performed by: INTERNAL MEDICINE

## 2021-07-13 RX ORDER — TIZANIDINE 4 MG/1
4 TABLET ORAL EVERY 6 HOURS PRN
COMMUNITY
End: 2021-11-16

## 2021-07-13 RX ORDER — FLUCONAZOLE 150 MG/1
150 TABLET ORAL ONCE
Status: ON HOLD | COMMUNITY
Start: 2021-04-28 | End: 2021-10-15 | Stop reason: CLARIF

## 2021-07-13 RX ORDER — SPIRONOLACTONE 25 MG/1
25 TABLET ORAL DAILY
Qty: 90 TABLET | Refills: 3 | Status: ON HOLD | OUTPATIENT
Start: 2021-07-13 | End: 2021-10-15 | Stop reason: CLARIF

## 2021-07-13 RX ORDER — ERGOCALCIFEROL 1.25 MG/1
50000 CAPSULE ORAL
COMMUNITY

## 2021-07-19 ENCOUNTER — HOSPITAL ENCOUNTER (OUTPATIENT)
Dept: RADIOLOGY | Facility: HOSPITAL | Age: 76
Discharge: HOME | End: 2021-07-19
Attending: INTERNAL MEDICINE
Payer: MEDICARE

## 2021-07-19 ENCOUNTER — TELEPHONE (OUTPATIENT)
Dept: CARDIOLOGY | Facility: CLINIC | Age: 76
End: 2021-07-19

## 2021-07-19 DIAGNOSIS — I65.23 CAROTID ARTERY PLAQUE, BILATERAL: ICD-10-CM

## 2021-07-19 PROCEDURE — 93880 EXTRACRANIAL BILAT STUDY: CPT | Mod: 26,,, | Performed by: RADIOLOGY

## 2021-07-19 PROCEDURE — 93880 EXTRACRANIAL BILAT STUDY: CPT | Mod: TC

## 2021-07-19 PROCEDURE — 93880 US CAROTID BILATERAL: ICD-10-PCS | Mod: 26,,, | Performed by: RADIOLOGY

## 2021-07-21 ENCOUNTER — HOSPITAL ENCOUNTER (OUTPATIENT)
Dept: CARDIOLOGY | Facility: HOSPITAL | Age: 76
Discharge: HOME | End: 2021-07-21
Attending: INTERNAL MEDICINE
Payer: MEDICARE

## 2021-07-21 DIAGNOSIS — R55 SYNCOPE, UNSPECIFIED SYNCOPE TYPE: ICD-10-CM

## 2021-07-21 PROCEDURE — 93227 XTRNL ECG REC<48 HR R&I: CPT | Mod: ,,, | Performed by: INTERNAL MEDICINE

## 2021-07-21 PROCEDURE — 93226 XTRNL ECG REC<48 HR SCAN A/R: CPT

## 2021-07-21 PROCEDURE — 93227 HOLTER MONITOR - 48 HOUR (CUPID ONLY): ICD-10-PCS | Mod: ,,, | Performed by: INTERNAL MEDICINE

## 2021-07-27 LAB
OHS CV EVENT MONITOR DAY: 0
OHS CV HOLTER LENGTH DECIMAL HOURS: 48
OHS CV HOLTER LENGTH HOURS: 48
OHS CV HOLTER LENGTH MINUTES: 0

## 2021-07-28 ENCOUNTER — PATIENT MESSAGE (OUTPATIENT)
Dept: CARDIOLOGY | Facility: CLINIC | Age: 76
End: 2021-07-28

## 2021-07-28 ENCOUNTER — TELEPHONE (OUTPATIENT)
Dept: CARDIOLOGY | Facility: CLINIC | Age: 76
End: 2021-07-28

## 2021-07-28 DIAGNOSIS — R55 SYNCOPE AND COLLAPSE: Primary | ICD-10-CM

## 2021-08-18 ENCOUNTER — TELEPHONE (OUTPATIENT)
Dept: NEUROLOGY | Facility: CLINIC | Age: 76
End: 2021-08-18

## 2021-09-15 ENCOUNTER — TELEPHONE (OUTPATIENT)
Dept: CARDIOLOGY | Facility: HOSPITAL | Age: 76
End: 2021-09-15

## 2021-09-17 ENCOUNTER — OFFICE VISIT (OUTPATIENT)
Dept: CARDIOLOGY | Facility: CLINIC | Age: 76
End: 2021-09-17
Payer: MEDICARE

## 2021-09-17 VITALS
DIASTOLIC BLOOD PRESSURE: 70 MMHG | SYSTOLIC BLOOD PRESSURE: 155 MMHG | HEIGHT: 63 IN | WEIGHT: 136.25 LBS | BODY MASS INDEX: 24.14 KG/M2 | HEART RATE: 103 BPM

## 2021-09-17 DIAGNOSIS — I49.8 OTHER SPECIFIED CARDIAC ARRHYTHMIAS: ICD-10-CM

## 2021-09-17 DIAGNOSIS — I49.1 PREMATURE ATRIAL CONTRACTIONS: ICD-10-CM

## 2021-09-17 DIAGNOSIS — R55 SYNCOPE AND COLLAPSE: ICD-10-CM

## 2021-09-17 DIAGNOSIS — R55 SYNCOPE, UNSPECIFIED SYNCOPE TYPE: Primary | ICD-10-CM

## 2021-09-17 DIAGNOSIS — I10 ESSENTIAL HYPERTENSION: ICD-10-CM

## 2021-09-17 PROCEDURE — 99205 OFFICE O/P NEW HI 60 MIN: CPT | Mod: S$GLB,,, | Performed by: INTERNAL MEDICINE

## 2021-09-17 PROCEDURE — 3288F PR FALLS RISK ASSESSMENT DOCUMENTED: ICD-10-PCS | Mod: CPTII,S$GLB,, | Performed by: INTERNAL MEDICINE

## 2021-09-17 PROCEDURE — 1159F PR MEDICATION LIST DOCUMENTED IN MEDICAL RECORD: ICD-10-PCS | Mod: CPTII,S$GLB,, | Performed by: INTERNAL MEDICINE

## 2021-09-17 PROCEDURE — 3288F FALL RISK ASSESSMENT DOCD: CPT | Mod: CPTII,S$GLB,, | Performed by: INTERNAL MEDICINE

## 2021-09-17 PROCEDURE — 3077F PR MOST RECENT SYSTOLIC BLOOD PRESSURE >= 140 MM HG: ICD-10-PCS | Mod: CPTII,S$GLB,, | Performed by: INTERNAL MEDICINE

## 2021-09-17 PROCEDURE — 93010 RHYTHM STRIP: ICD-10-PCS | Mod: S$GLB,,, | Performed by: INTERNAL MEDICINE

## 2021-09-17 PROCEDURE — 3078F DIAST BP <80 MM HG: CPT | Mod: CPTII,S$GLB,, | Performed by: INTERNAL MEDICINE

## 2021-09-17 PROCEDURE — 99205 PR OFFICE/OUTPT VISIT, NEW, LEVL V, 60-74 MIN: ICD-10-PCS | Mod: S$GLB,,, | Performed by: INTERNAL MEDICINE

## 2021-09-17 PROCEDURE — 99999 PR PBB SHADOW E&M-EST. PATIENT-LVL IV: CPT | Mod: PBBFAC,,, | Performed by: INTERNAL MEDICINE

## 2021-09-17 PROCEDURE — 1101F PR PT FALLS ASSESS DOC 0-1 FALLS W/OUT INJ PAST YR: ICD-10-PCS | Mod: CPTII,S$GLB,, | Performed by: INTERNAL MEDICINE

## 2021-09-17 PROCEDURE — 93005 ELECTROCARDIOGRAM TRACING: CPT | Mod: S$GLB,,, | Performed by: INTERNAL MEDICINE

## 2021-09-17 PROCEDURE — 1160F RVW MEDS BY RX/DR IN RCRD: CPT | Mod: CPTII,S$GLB,, | Performed by: INTERNAL MEDICINE

## 2021-09-17 PROCEDURE — 1101F PT FALLS ASSESS-DOCD LE1/YR: CPT | Mod: CPTII,S$GLB,, | Performed by: INTERNAL MEDICINE

## 2021-09-17 PROCEDURE — 1160F PR REVIEW ALL MEDS BY PRESCRIBER/CLIN PHARMACIST DOCUMENTED: ICD-10-PCS | Mod: CPTII,S$GLB,, | Performed by: INTERNAL MEDICINE

## 2021-09-17 PROCEDURE — 3077F SYST BP >= 140 MM HG: CPT | Mod: CPTII,S$GLB,, | Performed by: INTERNAL MEDICINE

## 2021-09-17 PROCEDURE — 99999 PR PBB SHADOW E&M-EST. PATIENT-LVL IV: ICD-10-PCS | Mod: PBBFAC,,, | Performed by: INTERNAL MEDICINE

## 2021-09-17 PROCEDURE — 3078F PR MOST RECENT DIASTOLIC BLOOD PRESSURE < 80 MM HG: ICD-10-PCS | Mod: CPTII,S$GLB,, | Performed by: INTERNAL MEDICINE

## 2021-09-17 PROCEDURE — 1159F MED LIST DOCD IN RCRD: CPT | Mod: CPTII,S$GLB,, | Performed by: INTERNAL MEDICINE

## 2021-09-17 PROCEDURE — 93010 ELECTROCARDIOGRAM REPORT: CPT | Mod: S$GLB,,, | Performed by: INTERNAL MEDICINE

## 2021-09-17 PROCEDURE — 93005 RHYTHM STRIP: ICD-10-PCS | Mod: S$GLB,,, | Performed by: INTERNAL MEDICINE

## 2021-09-20 DIAGNOSIS — I49.8 OTHER SPECIFIED CARDIAC ARRHYTHMIAS: Primary | ICD-10-CM

## 2021-09-29 DIAGNOSIS — I49.1 PREMATURE ATRIAL CONTRACTION: Primary | ICD-10-CM

## 2021-09-29 DIAGNOSIS — R94.31 NONSPECIFIC ABNORMAL ELECTROCARDIOGRAM (ECG) (EKG): ICD-10-CM

## 2021-10-06 ENCOUNTER — OFFICE VISIT (OUTPATIENT)
Dept: NEUROLOGY | Facility: CLINIC | Age: 76
End: 2021-10-06
Payer: MEDICARE

## 2021-10-06 ENCOUNTER — TELEPHONE (OUTPATIENT)
Dept: ELECTROPHYSIOLOGY | Facility: CLINIC | Age: 76
End: 2021-10-06

## 2021-10-06 VITALS
HEART RATE: 81 BPM | HEIGHT: 63 IN | WEIGHT: 129.44 LBS | SYSTOLIC BLOOD PRESSURE: 183 MMHG | DIASTOLIC BLOOD PRESSURE: 151 MMHG | BODY MASS INDEX: 22.93 KG/M2

## 2021-10-06 DIAGNOSIS — R41.0 TRANSIENT CONFUSION: ICD-10-CM

## 2021-10-06 DIAGNOSIS — R55 SYNCOPE, UNSPECIFIED SYNCOPE TYPE: Primary | ICD-10-CM

## 2021-10-06 DIAGNOSIS — R56.9 CONVULSIONS, UNSPECIFIED CONVULSION TYPE: ICD-10-CM

## 2021-10-06 PROCEDURE — 1159F PR MEDICATION LIST DOCUMENTED IN MEDICAL RECORD: ICD-10-PCS | Mod: CPTII,S$GLB,, | Performed by: PSYCHIATRY & NEUROLOGY

## 2021-10-06 PROCEDURE — 1160F PR REVIEW ALL MEDS BY PRESCRIBER/CLIN PHARMACIST DOCUMENTED: ICD-10-PCS | Mod: CPTII,S$GLB,, | Performed by: PSYCHIATRY & NEUROLOGY

## 2021-10-06 PROCEDURE — 3080F PR MOST RECENT DIASTOLIC BLOOD PRESSURE >= 90 MM HG: ICD-10-PCS | Mod: CPTII,S$GLB,, | Performed by: PSYCHIATRY & NEUROLOGY

## 2021-10-06 PROCEDURE — 3288F PR FALLS RISK ASSESSMENT DOCUMENTED: ICD-10-PCS | Mod: CPTII,S$GLB,, | Performed by: PSYCHIATRY & NEUROLOGY

## 2021-10-06 PROCEDURE — 1126F PR PAIN SEVERITY QUANTIFIED, NO PAIN PRESENT: ICD-10-PCS | Mod: CPTII,S$GLB,, | Performed by: PSYCHIATRY & NEUROLOGY

## 2021-10-06 PROCEDURE — 3077F SYST BP >= 140 MM HG: CPT | Mod: CPTII,S$GLB,, | Performed by: PSYCHIATRY & NEUROLOGY

## 2021-10-06 PROCEDURE — 3080F DIAST BP >= 90 MM HG: CPT | Mod: CPTII,S$GLB,, | Performed by: PSYCHIATRY & NEUROLOGY

## 2021-10-06 PROCEDURE — 1159F MED LIST DOCD IN RCRD: CPT | Mod: CPTII,S$GLB,, | Performed by: PSYCHIATRY & NEUROLOGY

## 2021-10-06 PROCEDURE — 1160F RVW MEDS BY RX/DR IN RCRD: CPT | Mod: CPTII,S$GLB,, | Performed by: PSYCHIATRY & NEUROLOGY

## 2021-10-06 PROCEDURE — 1126F AMNT PAIN NOTED NONE PRSNT: CPT | Mod: CPTII,S$GLB,, | Performed by: PSYCHIATRY & NEUROLOGY

## 2021-10-06 PROCEDURE — 99204 PR OFFICE/OUTPT VISIT, NEW, LEVL IV, 45-59 MIN: ICD-10-PCS | Mod: S$GLB,,, | Performed by: PSYCHIATRY & NEUROLOGY

## 2021-10-06 PROCEDURE — 1101F PT FALLS ASSESS-DOCD LE1/YR: CPT | Mod: CPTII,S$GLB,, | Performed by: PSYCHIATRY & NEUROLOGY

## 2021-10-06 PROCEDURE — 1101F PR PT FALLS ASSESS DOC 0-1 FALLS W/OUT INJ PAST YR: ICD-10-PCS | Mod: CPTII,S$GLB,, | Performed by: PSYCHIATRY & NEUROLOGY

## 2021-10-06 PROCEDURE — 99204 OFFICE O/P NEW MOD 45 MIN: CPT | Mod: S$GLB,,, | Performed by: PSYCHIATRY & NEUROLOGY

## 2021-10-06 PROCEDURE — 3288F FALL RISK ASSESSMENT DOCD: CPT | Mod: CPTII,S$GLB,, | Performed by: PSYCHIATRY & NEUROLOGY

## 2021-10-06 PROCEDURE — 99999 PR PBB SHADOW E&M-EST. PATIENT-LVL IV: CPT | Mod: PBBFAC,,, | Performed by: PSYCHIATRY & NEUROLOGY

## 2021-10-06 PROCEDURE — 99999 PR PBB SHADOW E&M-EST. PATIENT-LVL IV: ICD-10-PCS | Mod: PBBFAC,,, | Performed by: PSYCHIATRY & NEUROLOGY

## 2021-10-06 PROCEDURE — 3077F PR MOST RECENT SYSTOLIC BLOOD PRESSURE >= 140 MM HG: ICD-10-PCS | Mod: CPTII,S$GLB,, | Performed by: PSYCHIATRY & NEUROLOGY

## 2021-10-11 ENCOUNTER — HOSPITAL ENCOUNTER (OUTPATIENT)
Dept: RADIOLOGY | Facility: HOSPITAL | Age: 76
Discharge: HOME | End: 2021-10-11
Attending: PSYCHIATRY & NEUROLOGY
Payer: MEDICARE

## 2021-10-11 DIAGNOSIS — R55 SYNCOPE, UNSPECIFIED SYNCOPE TYPE: ICD-10-CM

## 2021-10-11 DIAGNOSIS — R56.9 CONVULSIONS, UNSPECIFIED CONVULSION TYPE: ICD-10-CM

## 2021-10-11 DIAGNOSIS — R41.0 TRANSIENT CONFUSION: ICD-10-CM

## 2021-10-11 PROCEDURE — 70551 MRI BRAIN WITHOUT CONTRAST: ICD-10-PCS | Mod: 26,,, | Performed by: RADIOLOGY

## 2021-10-11 PROCEDURE — 70551 MRI BRAIN STEM W/O DYE: CPT | Mod: TC

## 2021-10-11 PROCEDURE — 70551 MRI BRAIN STEM W/O DYE: CPT | Mod: 26,,, | Performed by: RADIOLOGY

## 2021-10-12 ENCOUNTER — HOSPITAL ENCOUNTER (OUTPATIENT)
Dept: NEUROLOGY | Facility: CLINIC | Age: 76
Discharge: HOME | End: 2021-10-12
Payer: MEDICARE

## 2021-10-12 DIAGNOSIS — R55 SYNCOPE, UNSPECIFIED SYNCOPE TYPE: ICD-10-CM

## 2021-10-12 DIAGNOSIS — R41.0 TRANSIENT CONFUSION: ICD-10-CM

## 2021-10-12 PROCEDURE — 95819 PR EEG,W/AWAKE & ASLEEP RECORD: ICD-10-PCS | Mod: S$GLB,,, | Performed by: PSYCHIATRY & NEUROLOGY

## 2021-10-12 PROCEDURE — 95819 EEG AWAKE AND ASLEEP: CPT | Mod: S$GLB,,, | Performed by: PSYCHIATRY & NEUROLOGY

## 2021-10-13 ENCOUNTER — CLINICAL SUPPORT (OUTPATIENT)
Dept: URGENT CARE | Facility: CLINIC | Age: 76
End: 2021-10-13
Payer: MEDICARE

## 2021-10-13 ENCOUNTER — PATIENT MESSAGE (OUTPATIENT)
Dept: NEUROLOGY | Facility: CLINIC | Age: 76
End: 2021-10-13
Payer: MEDICARE

## 2021-10-13 DIAGNOSIS — Z20.822 ENCOUNTER FOR LABORATORY TESTING FOR COVID-19 VIRUS: Primary | ICD-10-CM

## 2021-10-13 DIAGNOSIS — Z01.818 PRE-OP TESTING: ICD-10-CM

## 2021-10-13 LAB
SARS-COV-2 RNA RESP QL NAA+PROBE: NOT DETECTED
SARS-COV-2- CYCLE NUMBER: NORMAL

## 2021-10-13 PROCEDURE — U0005 INFEC AGEN DETEC AMPLI PROBE: HCPCS | Performed by: INTERNAL MEDICINE

## 2021-10-13 PROCEDURE — U0003 INFECTIOUS AGENT DETECTION BY NUCLEIC ACID (DNA OR RNA); SEVERE ACUTE RESPIRATORY SYNDROME CORONAVIRUS 2 (SARS-COV-2) (CORONAVIRUS DISEASE [COVID-19]), AMPLIFIED PROBE TECHNIQUE, MAKING USE OF HIGH THROUGHPUT TECHNOLOGIES AS DESCRIBED BY CMS-2020-01-R: HCPCS | Performed by: INTERNAL MEDICINE

## 2021-10-14 ENCOUNTER — TELEPHONE (OUTPATIENT)
Dept: ELECTROPHYSIOLOGY | Facility: CLINIC | Age: 76
End: 2021-10-14

## 2021-10-15 ENCOUNTER — HOSPITAL ENCOUNTER (OUTPATIENT)
Facility: HOSPITAL | Age: 76
Discharge: HOME | End: 2021-10-15
Attending: INTERNAL MEDICINE | Admitting: INTERNAL MEDICINE
Payer: MEDICARE

## 2021-10-15 VITALS
SYSTOLIC BLOOD PRESSURE: 157 MMHG | TEMPERATURE: 98 F | HEART RATE: 75 BPM | HEIGHT: 63 IN | OXYGEN SATURATION: 96 % | RESPIRATION RATE: 21 BRPM | BODY MASS INDEX: 22.86 KG/M2 | DIASTOLIC BLOOD PRESSURE: 72 MMHG | WEIGHT: 129 LBS

## 2021-10-15 DIAGNOSIS — R55 SYNCOPE, UNSPECIFIED SYNCOPE TYPE: Primary | ICD-10-CM

## 2021-10-15 DIAGNOSIS — R94.31 NONSPECIFIC ABNORMAL ELECTROCARDIOGRAM (ECG) (EKG): ICD-10-CM

## 2021-10-15 DIAGNOSIS — I49.1 PREMATURE ATRIAL CONTRACTION: ICD-10-CM

## 2021-10-15 DIAGNOSIS — Z01.818 PRE-OP TESTING: ICD-10-CM

## 2021-10-15 PROCEDURE — 63600175 PHARM REV CODE 636 W HCPCS: Performed by: INTERNAL MEDICINE

## 2021-10-15 PROCEDURE — C1764 EVENT RECORDER, CARDIAC: HCPCS | Performed by: INTERNAL MEDICINE

## 2021-10-15 PROCEDURE — 33285 INSJ SUBQ CAR RHYTHM MNTR: CPT | Mod: ,,, | Performed by: INTERNAL MEDICINE

## 2021-10-15 PROCEDURE — 33285 PR INSERTION,SUBQ CARDIAC RHYTHM MONITOR, W/PRGRMG: ICD-10-PCS | Mod: ,,, | Performed by: INTERNAL MEDICINE

## 2021-10-15 PROCEDURE — 33285 INSJ SUBQ CAR RHYTHM MNTR: CPT | Performed by: INTERNAL MEDICINE

## 2021-10-15 PROCEDURE — 25000003 PHARM REV CODE 250: Performed by: INTERNAL MEDICINE

## 2021-10-15 DEVICE — SYS LINQ ICM MONITOR: Type: IMPLANTABLE DEVICE | Site: CHEST | Status: FUNCTIONAL

## 2021-10-15 RX ORDER — LIDOCAINE HYDROCHLORIDE AND EPINEPHRINE 5; 5 MG/ML; UG/ML
INJECTION, SOLUTION INFILTRATION; PERINEURAL
Status: DISCONTINUED | OUTPATIENT
Start: 2021-10-15 | End: 2021-10-15 | Stop reason: HOSPADM

## 2021-10-15 RX ORDER — TRIAMCINOLONE ACETONIDE 1 MG/G
CREAM TOPICAL 2 TIMES DAILY
COMMUNITY
Start: 2021-10-06 | End: 2024-03-21

## 2021-10-15 RX ORDER — VANCOMYCIN HYDROCHLORIDE 1 G/20ML
INJECTION, POWDER, LYOPHILIZED, FOR SOLUTION INTRAVENOUS
Status: DISCONTINUED | OUTPATIENT
Start: 2021-10-15 | End: 2021-10-15 | Stop reason: HOSPADM

## 2021-10-18 ENCOUNTER — TELEPHONE (OUTPATIENT)
Dept: CARDIOLOGY | Facility: HOSPITAL | Age: 76
End: 2021-10-18

## 2021-10-18 DIAGNOSIS — R00.2 PALPITATIONS: Primary | ICD-10-CM

## 2021-10-25 ENCOUNTER — CLINICAL SUPPORT (OUTPATIENT)
Dept: CARDIOLOGY | Facility: HOSPITAL | Age: 76
End: 2021-10-25
Attending: INTERNAL MEDICINE
Payer: MEDICARE

## 2021-10-25 DIAGNOSIS — R00.2 PALPITATIONS: ICD-10-CM

## 2021-10-25 PROCEDURE — 93285 PRGRMG DEV EVAL SCRMS IP: CPT | Mod: 26,,, | Performed by: INTERNAL MEDICINE

## 2021-10-25 PROCEDURE — 93285 CARDIAC DEVICE CHECK - IN CLINIC & HOSPITAL: ICD-10-PCS | Mod: 26,,, | Performed by: INTERNAL MEDICINE

## 2021-11-14 ENCOUNTER — CLINICAL SUPPORT (OUTPATIENT)
Dept: CARDIOLOGY | Facility: HOSPITAL | Age: 76
End: 2021-11-14
Payer: MEDICARE

## 2021-11-14 DIAGNOSIS — Z95.818 PRESENCE OF OTHER CARDIAC IMPLANTS AND GRAFTS: ICD-10-CM

## 2021-11-14 PROCEDURE — G2066 INTER DEVC REMOTE 30D: HCPCS | Performed by: INTERNAL MEDICINE

## 2021-11-14 PROCEDURE — 93298 REM INTERROG DEV EVAL SCRMS: CPT | Mod: ,,, | Performed by: INTERNAL MEDICINE

## 2021-11-14 PROCEDURE — 93298 CARDIAC DEVICE CHECK - REMOTE: ICD-10-PCS | Mod: ,,, | Performed by: INTERNAL MEDICINE

## 2021-11-16 ENCOUNTER — OFFICE VISIT (OUTPATIENT)
Dept: CARDIOLOGY | Facility: CLINIC | Age: 76
End: 2021-11-16
Payer: MEDICARE

## 2021-11-16 VITALS
SYSTOLIC BLOOD PRESSURE: 114 MMHG | DIASTOLIC BLOOD PRESSURE: 70 MMHG | HEIGHT: 63 IN | HEART RATE: 88 BPM | WEIGHT: 132.06 LBS | OXYGEN SATURATION: 98 % | BODY MASS INDEX: 23.4 KG/M2

## 2021-11-16 DIAGNOSIS — I95.2 HYPOTENSION DUE TO DRUGS: ICD-10-CM

## 2021-11-16 DIAGNOSIS — I49.1 PREMATURE ATRIAL CONTRACTIONS: ICD-10-CM

## 2021-11-16 DIAGNOSIS — R55 SYNCOPE, UNSPECIFIED SYNCOPE TYPE: Primary | ICD-10-CM

## 2021-11-16 DIAGNOSIS — I35.1 AORTIC VALVE INSUFFICIENCY, ETIOLOGY OF CARDIAC VALVE DISEASE UNSPECIFIED: ICD-10-CM

## 2021-11-16 DIAGNOSIS — R06.09 DYSPNEA ON EXERTION: ICD-10-CM

## 2021-11-16 DIAGNOSIS — I10 ESSENTIAL HYPERTENSION: ICD-10-CM

## 2021-11-16 DIAGNOSIS — J42 CHRONIC BRONCHITIS, UNSPECIFIED CHRONIC BRONCHITIS TYPE: ICD-10-CM

## 2021-11-16 PROCEDURE — 1160F PR REVIEW ALL MEDS BY PRESCRIBER/CLIN PHARMACIST DOCUMENTED: ICD-10-PCS | Mod: CPTII,S$GLB,, | Performed by: INTERNAL MEDICINE

## 2021-11-16 PROCEDURE — 99999 PR PBB SHADOW E&M-EST. PATIENT-LVL IV: ICD-10-PCS | Mod: PBBFAC,,, | Performed by: INTERNAL MEDICINE

## 2021-11-16 PROCEDURE — 99214 OFFICE O/P EST MOD 30 MIN: CPT | Mod: 25,S$GLB,, | Performed by: INTERNAL MEDICINE

## 2021-11-16 PROCEDURE — 93000 EKG 12-LEAD: ICD-10-PCS | Mod: S$GLB,,, | Performed by: INTERNAL MEDICINE

## 2021-11-16 PROCEDURE — 3074F PR MOST RECENT SYSTOLIC BLOOD PRESSURE < 130 MM HG: ICD-10-PCS | Mod: CPTII,S$GLB,, | Performed by: INTERNAL MEDICINE

## 2021-11-16 PROCEDURE — 99214 PR OFFICE/OUTPT VISIT, EST, LEVL IV, 30-39 MIN: ICD-10-PCS | Mod: 25,S$GLB,, | Performed by: INTERNAL MEDICINE

## 2021-11-16 PROCEDURE — 3288F PR FALLS RISK ASSESSMENT DOCUMENTED: ICD-10-PCS | Mod: CPTII,S$GLB,, | Performed by: INTERNAL MEDICINE

## 2021-11-16 PROCEDURE — 1126F PR PAIN SEVERITY QUANTIFIED, NO PAIN PRESENT: ICD-10-PCS | Mod: CPTII,S$GLB,, | Performed by: INTERNAL MEDICINE

## 2021-11-16 PROCEDURE — 3074F SYST BP LT 130 MM HG: CPT | Mod: CPTII,S$GLB,, | Performed by: INTERNAL MEDICINE

## 2021-11-16 PROCEDURE — 1101F PT FALLS ASSESS-DOCD LE1/YR: CPT | Mod: CPTII,S$GLB,, | Performed by: INTERNAL MEDICINE

## 2021-11-16 PROCEDURE — 3288F FALL RISK ASSESSMENT DOCD: CPT | Mod: CPTII,S$GLB,, | Performed by: INTERNAL MEDICINE

## 2021-11-16 PROCEDURE — 99999 PR PBB SHADOW E&M-EST. PATIENT-LVL IV: CPT | Mod: PBBFAC,,, | Performed by: INTERNAL MEDICINE

## 2021-11-16 PROCEDURE — 3078F PR MOST RECENT DIASTOLIC BLOOD PRESSURE < 80 MM HG: ICD-10-PCS | Mod: CPTII,S$GLB,, | Performed by: INTERNAL MEDICINE

## 2021-11-16 PROCEDURE — 1159F PR MEDICATION LIST DOCUMENTED IN MEDICAL RECORD: ICD-10-PCS | Mod: CPTII,S$GLB,, | Performed by: INTERNAL MEDICINE

## 2021-11-16 PROCEDURE — 1101F PR PT FALLS ASSESS DOC 0-1 FALLS W/OUT INJ PAST YR: ICD-10-PCS | Mod: CPTII,S$GLB,, | Performed by: INTERNAL MEDICINE

## 2021-11-16 PROCEDURE — 1126F AMNT PAIN NOTED NONE PRSNT: CPT | Mod: CPTII,S$GLB,, | Performed by: INTERNAL MEDICINE

## 2021-11-16 PROCEDURE — 93000 ELECTROCARDIOGRAM COMPLETE: CPT | Mod: S$GLB,,, | Performed by: INTERNAL MEDICINE

## 2021-11-16 PROCEDURE — 3078F DIAST BP <80 MM HG: CPT | Mod: CPTII,S$GLB,, | Performed by: INTERNAL MEDICINE

## 2021-11-16 PROCEDURE — 1159F MED LIST DOCD IN RCRD: CPT | Mod: CPTII,S$GLB,, | Performed by: INTERNAL MEDICINE

## 2021-11-16 PROCEDURE — 1160F RVW MEDS BY RX/DR IN RCRD: CPT | Mod: CPTII,S$GLB,, | Performed by: INTERNAL MEDICINE

## 2021-11-25 ENCOUNTER — HOSPITAL ENCOUNTER (EMERGENCY)
Facility: HOSPITAL | Age: 76
Discharge: HOME | End: 2021-11-26
Attending: EMERGENCY MEDICINE
Payer: MEDICARE

## 2021-11-25 DIAGNOSIS — N39.0 UTI (URINARY TRACT INFECTION), UNCOMPLICATED: ICD-10-CM

## 2021-11-25 DIAGNOSIS — E16.2 HYPOGLYCEMIA: Primary | ICD-10-CM

## 2021-11-25 DIAGNOSIS — R41.82 AMS (ALTERED MENTAL STATUS): ICD-10-CM

## 2021-11-25 LAB
ALBUMIN SERPL BCP-MCNC: 3.9 G/DL (ref 3.5–5.2)
ALLENS TEST: ABNORMAL
ALP SERPL-CCNC: 69 U/L (ref 55–135)
ALT SERPL W/O P-5'-P-CCNC: 12 U/L (ref 10–44)
ANION GAP SERPL CALC-SCNC: 11 MMOL/L (ref 8–16)
AST SERPL-CCNC: 22 U/L (ref 10–40)
BACTERIA #/AREA URNS HPF: ABNORMAL /HPF
BASOPHILS # BLD AUTO: 0.08 K/UL (ref 0–0.2)
BASOPHILS NFR BLD: 0.8 % (ref 0–1.9)
BILIRUB SERPL-MCNC: 0.6 MG/DL (ref 0.1–1)
BILIRUB UR QL STRIP: NEGATIVE
BNP SERPL-MCNC: 180 PG/ML (ref 0–99)
BUN SERPL-MCNC: 19 MG/DL (ref 8–23)
CALCIUM SERPL-MCNC: 9.6 MG/DL (ref 8.7–10.5)
CHLORIDE SERPL-SCNC: 103 MMOL/L (ref 95–110)
CLARITY UR: ABNORMAL
CO2 SERPL-SCNC: 20 MMOL/L (ref 23–29)
COLOR UR: YELLOW
CREAT SERPL-MCNC: 1.3 MG/DL (ref 0.5–1.4)
DELSYS: ABNORMAL
DIFFERENTIAL METHOD: ABNORMAL
EOSINOPHIL # BLD AUTO: 0 K/UL (ref 0–0.5)
EOSINOPHIL NFR BLD: 0.3 % (ref 0–8)
ERYTHROCYTE [DISTWIDTH] IN BLOOD BY AUTOMATED COUNT: 16.5 % (ref 11.5–14.5)
EST. GFR  (AFRICAN AMERICAN): 46 ML/MIN/1.73 M^2
EST. GFR  (NON AFRICAN AMERICAN): 39.9 ML/MIN/1.73 M^2
GLUCOSE SERPL-MCNC: 106 MG/DL (ref 70–110)
GLUCOSE SERPL-MCNC: 107 MG/DL (ref 70–110)
GLUCOSE SERPL-MCNC: 134 MG/DL (ref 70–110)
GLUCOSE SERPL-MCNC: 68 MG/DL (ref 70–110)
GLUCOSE SERPL-MCNC: 73 MG/DL (ref 70–110)
GLUCOSE SERPL-MCNC: 92 MG/DL (ref 70–110)
GLUCOSE UR QL STRIP: NEGATIVE
HCO3 UR-SCNC: 21.7 MMOL/L (ref 24–28)
HCT VFR BLD AUTO: 36.4 % (ref 37–48.5)
HGB BLD-MCNC: 10.9 G/DL (ref 12–16)
HGB UR QL STRIP: ABNORMAL
HYALINE CASTS #/AREA URNS LPF: 1 /LPF
IMM GRANULOCYTES # BLD AUTO: 0.12 K/UL (ref 0–0.04)
IMM GRANULOCYTES NFR BLD AUTO: 1.1 % (ref 0–0.5)
KETONES UR QL STRIP: NEGATIVE
LEUKOCYTE ESTERASE UR QL STRIP: ABNORMAL
LYMPHOCYTES # BLD AUTO: 0.8 K/UL (ref 1–4.8)
LYMPHOCYTES NFR BLD: 7.3 % (ref 18–48)
MAGNESIUM SERPL-MCNC: 1.7 MG/DL (ref 1.6–2.6)
MCH RBC QN AUTO: 23.5 PG (ref 27–31)
MCHC RBC AUTO-ENTMCNC: 29.9 G/DL (ref 32–36)
MCV RBC AUTO: 78 FL (ref 82–98)
MICROSCOPIC COMMENT: ABNORMAL
MONOCYTES # BLD AUTO: 0.7 K/UL (ref 0.3–1)
MONOCYTES NFR BLD: 6.3 % (ref 4–15)
NEUTROPHILS # BLD AUTO: 8.9 K/UL (ref 1.8–7.7)
NEUTROPHILS NFR BLD: 84.2 % (ref 38–73)
NITRITE UR QL STRIP: POSITIVE
NRBC BLD-RTO: 0 /100 WBC
PCO2 BLDA: 43.1 MMHG (ref 35–45)
PH SMN: 7.31 [PH] (ref 7.35–7.45)
PH UR STRIP: 6 [PH] (ref 5–8)
PHOSPHATE SERPL-MCNC: 2.3 MG/DL (ref 2.7–4.5)
PLATELET # BLD AUTO: 255 K/UL (ref 150–450)
PMV BLD AUTO: 9.8 FL (ref 9.2–12.9)
PO2 BLDA: 34 MMHG (ref 40–60)
POC BE: -5 MMOL/L
POC SATURATED O2: 59 % (ref 95–100)
POC TCO2: 23 MMOL/L (ref 24–29)
POTASSIUM SERPL-SCNC: 3.6 MMOL/L (ref 3.5–5.1)
PROT SERPL-MCNC: 6.9 G/DL (ref 6–8.4)
PROT UR QL STRIP: NEGATIVE
RBC # BLD AUTO: 4.64 M/UL (ref 4–5.4)
RBC #/AREA URNS HPF: 1 /HPF (ref 0–4)
SAMPLE: ABNORMAL
SITE: ABNORMAL
SODIUM SERPL-SCNC: 134 MMOL/L (ref 136–145)
SP GR UR STRIP: 1.01 (ref 1–1.03)
SQUAMOUS #/AREA URNS HPF: 0 /HPF
TROPONIN I SERPL DL<=0.01 NG/ML-MCNC: <0.03 NG/ML
TSH SERPL DL<=0.005 MIU/L-ACNC: 1.32 UIU/ML (ref 0.34–5.6)
URN SPEC COLLECT METH UR: ABNORMAL
UROBILINOGEN UR STRIP-ACNC: NEGATIVE EU/DL
WBC # BLD AUTO: 10.56 K/UL (ref 3.9–12.7)
WBC #/AREA URNS HPF: >100 /HPF (ref 0–5)

## 2021-11-25 PROCEDURE — 83036 HEMOGLOBIN GLYCOSYLATED A1C: CPT | Performed by: STUDENT IN AN ORGANIZED HEALTH CARE EDUCATION/TRAINING PROGRAM

## 2021-11-25 PROCEDURE — 93010 EKG 12-LEAD: ICD-10-PCS | Mod: ,,, | Performed by: INTERNAL MEDICINE

## 2021-11-25 PROCEDURE — 82803 BLOOD GASES ANY COMBINATION: CPT

## 2021-11-25 PROCEDURE — 87077 CULTURE AEROBIC IDENTIFY: CPT | Performed by: STUDENT IN AN ORGANIZED HEALTH CARE EDUCATION/TRAINING PROGRAM

## 2021-11-25 PROCEDURE — 83880 ASSAY OF NATRIURETIC PEPTIDE: CPT | Performed by: STUDENT IN AN ORGANIZED HEALTH CARE EDUCATION/TRAINING PROGRAM

## 2021-11-25 PROCEDURE — 81001 URINALYSIS AUTO W/SCOPE: CPT | Performed by: STUDENT IN AN ORGANIZED HEALTH CARE EDUCATION/TRAINING PROGRAM

## 2021-11-25 PROCEDURE — 25500020 PHARM REV CODE 255: Performed by: EMERGENCY MEDICINE

## 2021-11-25 PROCEDURE — 99285 EMERGENCY DEPT VISIT HI MDM: CPT | Mod: 25

## 2021-11-25 PROCEDURE — 84443 ASSAY THYROID STIM HORMONE: CPT | Performed by: STUDENT IN AN ORGANIZED HEALTH CARE EDUCATION/TRAINING PROGRAM

## 2021-11-25 PROCEDURE — 94799 UNLISTED PULMONARY SVC/PX: CPT

## 2021-11-25 PROCEDURE — 84100 ASSAY OF PHOSPHORUS: CPT | Performed by: STUDENT IN AN ORGANIZED HEALTH CARE EDUCATION/TRAINING PROGRAM

## 2021-11-25 PROCEDURE — 93010 ELECTROCARDIOGRAM REPORT: CPT | Mod: ,,, | Performed by: INTERNAL MEDICINE

## 2021-11-25 PROCEDURE — 85025 COMPLETE CBC W/AUTO DIFF WBC: CPT | Performed by: STUDENT IN AN ORGANIZED HEALTH CARE EDUCATION/TRAINING PROGRAM

## 2021-11-25 PROCEDURE — 83735 ASSAY OF MAGNESIUM: CPT | Performed by: STUDENT IN AN ORGANIZED HEALTH CARE EDUCATION/TRAINING PROGRAM

## 2021-11-25 PROCEDURE — 99900031 HC PATIENT EDUCATION (STAT)

## 2021-11-25 PROCEDURE — 25000003 PHARM REV CODE 250: Performed by: STUDENT IN AN ORGANIZED HEALTH CARE EDUCATION/TRAINING PROGRAM

## 2021-11-25 PROCEDURE — 87186 SC STD MICRODIL/AGAR DIL: CPT | Performed by: STUDENT IN AN ORGANIZED HEALTH CARE EDUCATION/TRAINING PROGRAM

## 2021-11-25 PROCEDURE — 99900035 HC TECH TIME PER 15 MIN (STAT)

## 2021-11-25 PROCEDURE — 87086 URINE CULTURE/COLONY COUNT: CPT | Performed by: STUDENT IN AN ORGANIZED HEALTH CARE EDUCATION/TRAINING PROGRAM

## 2021-11-25 PROCEDURE — 93005 ELECTROCARDIOGRAM TRACING: CPT | Performed by: INTERNAL MEDICINE

## 2021-11-25 PROCEDURE — 84484 ASSAY OF TROPONIN QUANT: CPT | Performed by: STUDENT IN AN ORGANIZED HEALTH CARE EDUCATION/TRAINING PROGRAM

## 2021-11-25 PROCEDURE — 80053 COMPREHEN METABOLIC PANEL: CPT | Performed by: STUDENT IN AN ORGANIZED HEALTH CARE EDUCATION/TRAINING PROGRAM

## 2021-11-25 PROCEDURE — 82962 GLUCOSE BLOOD TEST: CPT | Mod: 91

## 2021-11-25 RX ORDER — ONDANSETRON 4 MG/1
8 TABLET, ORALLY DISINTEGRATING ORAL
Status: COMPLETED | OUTPATIENT
Start: 2021-11-25 | End: 2021-11-25

## 2021-11-25 RX ORDER — SPIRONOLACTONE 25 MG/1
25 TABLET ORAL DAILY
COMMUNITY
End: 2022-05-09

## 2021-11-25 RX ORDER — SODIUM,POTASSIUM PHOSPHATES 280-250MG
1 POWDER IN PACKET (EA) ORAL ONCE
Status: COMPLETED | OUTPATIENT
Start: 2021-11-25 | End: 2021-11-26

## 2021-11-25 RX ADMIN — IOHEXOL 70 ML: 350 INJECTION, SOLUTION INTRAVENOUS at 11:11

## 2021-11-25 RX ADMIN — ONDANSETRON 8 MG: 4 TABLET, ORALLY DISINTEGRATING ORAL at 10:11

## 2021-11-26 VITALS
BODY MASS INDEX: 20.02 KG/M2 | TEMPERATURE: 99 F | OXYGEN SATURATION: 96 % | HEART RATE: 80 BPM | RESPIRATION RATE: 16 BRPM | SYSTOLIC BLOOD PRESSURE: 156 MMHG | DIASTOLIC BLOOD PRESSURE: 82 MMHG | WEIGHT: 113 LBS

## 2021-11-26 LAB
ESTIMATED AVG GLUCOSE: 111 MG/DL (ref 68–131)
GLUCOSE SERPL-MCNC: 102 MG/DL (ref 70–110)
GLUCOSE SERPL-MCNC: 109 MG/DL (ref 70–110)
GLUCOSE SERPL-MCNC: 114 MG/DL (ref 70–110)
GLUCOSE SERPL-MCNC: 81 MG/DL (ref 70–110)
GLUCOSE SERPL-MCNC: 93 MG/DL (ref 70–110)
GLUCOSE SERPL-MCNC: 97 MG/DL (ref 70–110)
HBA1C MFR BLD: 5.5 % (ref 4.5–6.2)
SARS-COV-2 RDRP RESP QL NAA+PROBE: NEGATIVE

## 2021-11-26 PROCEDURE — U0002 COVID-19 LAB TEST NON-CDC: HCPCS | Performed by: EMERGENCY MEDICINE

## 2021-11-26 PROCEDURE — 82962 GLUCOSE BLOOD TEST: CPT | Mod: 91

## 2021-11-26 PROCEDURE — 25000003 PHARM REV CODE 250: Performed by: STUDENT IN AN ORGANIZED HEALTH CARE EDUCATION/TRAINING PROGRAM

## 2021-11-26 RX ORDER — CEPHALEXIN 250 MG/1
500 CAPSULE ORAL
Status: DISCONTINUED | OUTPATIENT
Start: 2021-11-26 | End: 2021-11-26

## 2021-11-26 RX ORDER — CIPROFLOXACIN 250 MG/1
250 TABLET, FILM COATED ORAL 2 TIMES DAILY
Qty: 6 TABLET | Refills: 0 | Status: SHIPPED | OUTPATIENT
Start: 2021-11-26 | End: 2021-11-29

## 2021-11-26 RX ORDER — CIPROFLOXACIN 250 MG/1
250 TABLET, FILM COATED ORAL
Status: COMPLETED | OUTPATIENT
Start: 2021-11-26 | End: 2021-11-26

## 2021-11-26 RX ORDER — INSULIN PUMP SYRINGE, 3 ML
EACH MISCELLANEOUS
Qty: 1 EACH | Refills: 0 | Status: SHIPPED | OUTPATIENT
Start: 2021-11-26 | End: 2024-03-28

## 2021-11-26 RX ADMIN — POTASSIUM, SODIUM PHOSPHATES 280 MG-160 MG-250 MG ORAL POWDER PACKET 1 PACKET: POWDER IN PACKET at 12:11

## 2021-11-26 RX ADMIN — CIPROFLOXACIN 250 MG: 250 TABLET ORAL at 01:11

## 2021-11-27 LAB — BACTERIA UR CULT: ABNORMAL

## 2021-12-14 ENCOUNTER — CLINICAL SUPPORT (OUTPATIENT)
Dept: CARDIOLOGY | Facility: HOSPITAL | Age: 76
End: 2021-12-14
Payer: MEDICARE

## 2021-12-14 DIAGNOSIS — Z95.818 PRESENCE OF OTHER CARDIAC IMPLANTS AND GRAFTS: ICD-10-CM

## 2021-12-14 PROCEDURE — G2066 INTER DEVC REMOTE 30D: HCPCS | Performed by: INTERNAL MEDICINE

## 2021-12-14 PROCEDURE — 93298 CARDIAC DEVICE CHECK - REMOTE: ICD-10-PCS | Mod: ,,, | Performed by: INTERNAL MEDICINE

## 2021-12-14 PROCEDURE — 93298 REM INTERROG DEV EVAL SCRMS: CPT | Mod: ,,, | Performed by: INTERNAL MEDICINE

## 2022-01-13 ENCOUNTER — CLINICAL SUPPORT (OUTPATIENT)
Dept: CARDIOLOGY | Facility: HOSPITAL | Age: 77
End: 2022-01-13
Payer: MEDICARE

## 2022-01-13 DIAGNOSIS — Z95.818 PRESENCE OF OTHER CARDIAC IMPLANTS AND GRAFTS: ICD-10-CM

## 2022-01-13 PROCEDURE — G2066 INTER DEVC REMOTE 30D: HCPCS | Performed by: INTERNAL MEDICINE

## 2022-01-13 PROCEDURE — 93298 REM INTERROG DEV EVAL SCRMS: CPT | Mod: ,,, | Performed by: INTERNAL MEDICINE

## 2022-01-13 PROCEDURE — 93298 CARDIAC DEVICE CHECK - REMOTE: ICD-10-PCS | Mod: ,,, | Performed by: INTERNAL MEDICINE

## 2022-02-04 ENCOUNTER — OFFICE VISIT (OUTPATIENT)
Dept: URGENT CARE | Facility: CLINIC | Age: 77
End: 2022-02-04
Payer: MEDICARE

## 2022-02-04 VITALS
BODY MASS INDEX: 24.8 KG/M2 | WEIGHT: 140 LBS | DIASTOLIC BLOOD PRESSURE: 88 MMHG | TEMPERATURE: 98 F | HEIGHT: 63 IN | RESPIRATION RATE: 20 BRPM | OXYGEN SATURATION: 97 % | HEART RATE: 88 BPM | SYSTOLIC BLOOD PRESSURE: 138 MMHG

## 2022-02-04 DIAGNOSIS — J06.9 VIRAL URI WITH COUGH: ICD-10-CM

## 2022-02-04 DIAGNOSIS — Z11.52 ENCOUNTER FOR SCREENING LABORATORY TESTING FOR COVID-19 VIRUS: Primary | ICD-10-CM

## 2022-02-04 LAB
CTP QC/QA: YES
SARS-COV-2 RDRP RESP QL NAA+PROBE: NEGATIVE

## 2022-02-04 PROCEDURE — 3075F PR MOST RECENT SYSTOLIC BLOOD PRESS GE 130-139MM HG: ICD-10-PCS | Mod: CPTII,S$GLB,,

## 2022-02-04 PROCEDURE — U0002: ICD-10-PCS | Mod: QW,S$GLB,,

## 2022-02-04 PROCEDURE — 1159F PR MEDICATION LIST DOCUMENTED IN MEDICAL RECORD: ICD-10-PCS | Mod: CPTII,S$GLB,,

## 2022-02-04 PROCEDURE — 99203 PR OFFICE/OUTPT VISIT, NEW, LEVL III, 30-44 MIN: ICD-10-PCS | Mod: S$GLB,CS,,

## 2022-02-04 PROCEDURE — 99203 OFFICE O/P NEW LOW 30 MIN: CPT | Mod: S$GLB,CS,,

## 2022-02-04 PROCEDURE — U0002 COVID-19 LAB TEST NON-CDC: HCPCS | Mod: QW,S$GLB,,

## 2022-02-04 PROCEDURE — 1159F MED LIST DOCD IN RCRD: CPT | Mod: CPTII,S$GLB,,

## 2022-02-04 PROCEDURE — 3079F PR MOST RECENT DIASTOLIC BLOOD PRESSURE 80-89 MM HG: ICD-10-PCS | Mod: CPTII,S$GLB,,

## 2022-02-04 PROCEDURE — 3075F SYST BP GE 130 - 139MM HG: CPT | Mod: CPTII,S$GLB,,

## 2022-02-04 PROCEDURE — 1160F PR REVIEW ALL MEDS BY PRESCRIBER/CLIN PHARMACIST DOCUMENTED: ICD-10-PCS | Mod: CPTII,S$GLB,,

## 2022-02-04 PROCEDURE — 3079F DIAST BP 80-89 MM HG: CPT | Mod: CPTII,S$GLB,,

## 2022-02-04 PROCEDURE — 1160F RVW MEDS BY RX/DR IN RCRD: CPT | Mod: CPTII,S$GLB,,

## 2022-02-04 RX ORDER — BENZONATATE 200 MG/1
200 CAPSULE ORAL 3 TIMES DAILY PRN
Qty: 30 CAPSULE | Refills: 0 | Status: SHIPPED | OUTPATIENT
Start: 2022-02-04 | End: 2022-02-14

## 2022-02-04 RX ORDER — GUAIFENESIN 600 MG/1
1200 TABLET, EXTENDED RELEASE ORAL 2 TIMES DAILY
Qty: 28 TABLET | Refills: 0 | Status: SHIPPED | OUTPATIENT
Start: 2022-02-04 | End: 2022-02-11

## 2022-02-04 RX ORDER — LEVOCETIRIZINE DIHYDROCHLORIDE 5 MG/1
5 TABLET, FILM COATED ORAL NIGHTLY
Qty: 30 TABLET | Refills: 11 | Status: SHIPPED | OUTPATIENT
Start: 2022-02-04 | End: 2024-03-21

## 2022-02-04 NOTE — PROGRESS NOTES
"Subjective:       Patient ID: Autumn Harris is a 76 y.o. female.    Vitals:  height is 5' 3" (1.6 m) and weight is 63.5 kg (140 lb). Her oral temperature is 97.5 °F (36.4 °C). Her blood pressure is 138/88 and her pulse is 88. Her respiration is 20 and oxygen saturation is 97%.     Chief Complaint: Sinus Problem    Patient presents for congestion and cough x3 days. She denies covid positive exposure. She states that she is not vaccinated against covid. She has been taking tylenol and aleve for symptoms which has helped. Her main complaint is that she is tired all of the time.     Sinus Problem  This is a new problem. The current episode started in the past 7 days (3 days ago). The problem has been gradually worsening since onset. There has been no fever. She is experiencing no pain. Associated symptoms include chills, congestion, coughing, headaches, sinus pressure and a sore throat. Pertinent negatives include no ear pain, shortness of breath or sneezing. Past treatments include acetaminophen. The treatment provided mild relief.       Constitution: Positive for chills. Negative for fatigue.   HENT: Positive for congestion, sinus pressure and sore throat. Negative for ear pain and postnasal drip.    Neck: Negative for neck stiffness.   Eyes: Negative for eye pain and eye redness.   Respiratory: Positive for cough. Negative for shortness of breath.    Gastrointestinal: Negative for nausea, vomiting, constipation and diarrhea.   Musculoskeletal: Positive for muscle ache.   Skin: Negative for pale and rash.   Allergic/Immunologic: Negative for sneezing.   Neurological: Positive for headaches. Negative for disorientation and altered mental status.   Psychiatric/Behavioral: Negative for altered mental status, disorientation and confusion.       Objective:      Physical Exam   Constitutional: She is oriented to person, place, and time. She appears well-developed and well-nourished. She is cooperative.  Non-toxic " appearance. She does not have a sickly appearance. She does not appear ill. No distress.      Comments:Patient sits comfortably in exam chair. Answers questions in complete sentences. Does not show any signs of distress or discoloration.      HENT:   Head: Normocephalic and atraumatic.   Ears:   Right Ear: Hearing, tympanic membrane, external ear and ear canal normal.   Left Ear: Hearing, tympanic membrane, external ear and ear canal normal.   Nose: Rhinorrhea present. No mucosal edema or nasal deformity. No epistaxis. Right sinus exhibits no maxillary sinus tenderness and no frontal sinus tenderness. Left sinus exhibits no maxillary sinus tenderness and no frontal sinus tenderness.   Mouth/Throat: Uvula is midline and mucous membranes are normal. No trismus in the jaw. Normal dentition. No uvula swelling. Posterior oropharyngeal erythema present. No oropharyngeal exudate or posterior oropharyngeal edema.   Eyes: Conjunctivae and lids are normal. No scleral icterus.   Neck: Trachea normal and phonation normal. Neck supple. No edema present. No erythema present. No neck rigidity present.   Cardiovascular: Normal rate, regular rhythm, normal heart sounds, intact distal pulses and normal pulses.   Pulmonary/Chest: Effort normal and breath sounds normal. No stridor. No respiratory distress. She has no decreased breath sounds. She has no wheezes. She has no rhonchi. She has no rales.   Abdominal: Normal appearance.   Musculoskeletal: Normal range of motion.         General: No deformity or edema. Normal range of motion.   Neurological: She is alert and oriented to person, place, and time. She exhibits normal muscle tone. Coordination normal.   Skin: Skin is warm, dry, intact, not diaphoretic and not pale.   Psychiatric: She has a normal mood and affect. Her speech is normal and behavior is normal. Judgment and thought content normal. Cognition and memory  Nursing note and vitals reviewed.        Results for orders placed  or performed in visit on 02/04/22   POCT COVID-19 Rapid Screening   Result Value Ref Range    POC Rapid COVID Negative Negative     Acceptable Yes        Assessment:       1. Encounter for screening laboratory testing for COVID-19 virus    2. Viral URI with cough          Plan:         Encounter for screening laboratory testing for COVID-19 virus  -     POCT COVID-19 Rapid Screening    Viral URI with cough  -     levocetirizine (XYZAL) 5 MG tablet; Take 1 tablet (5 mg total) by mouth every evening.  Dispense: 30 tablet; Refill: 11  -     guaiFENesin (MUCINEX) 600 mg 12 hr tablet; Take 2 tablets (1,200 mg total) by mouth 2 (two) times daily. for 7 days  Dispense: 28 tablet; Refill: 0  -     benzonatate (TESSALON) 200 MG capsule; Take 1 capsule (200 mg total) by mouth 3 (three) times daily as needed for Cough.  Dispense: 30 capsule; Refill: 0                 Patient Instructions   - Rest.    - Drink plenty of fluids.    - Acetaminophen (tylenol) or Ibuprofen (advil,motrin) as directed as needed for fever/pain. Avoid tylenol if you have a history of liver disease. Do not take ibuprofen if you have a history of GI bleeding, kidney disease, or if you take blood thinners.     - You must understand that you have received an Urgent Care treatment only and that you may be released before all of your medical problems are known or treated.   - You, the patient, will arrange for follow up care as instructed.   - If your condition worsens or fails to improve we recommend that you receive another evaluation at the ER immediately or contact your PCP to discuss your concerns or return here.   - Follow up with your PCP or specialty clinic as directed in the next 1-2 weeks if not improved or as needed.  You can call [phone] to schedule an appointment with the appropriate provider.      If your symptoms do not improve or worsen, go to the emergency room immediately.    Patient Education       Cough, Runny Nose,  and the Common Cold Discharge Instructions   About this topic   The common cold is an infection in the nose and throat. A tiny germ, called a virus, causes this infection. It often affects your nose, throat, ears, and sinuses. A cold can easily spread from person to person. Coughing, sneezing, or touching something with the germ on it spreads the cold.  Most colds go away on their own without treatment. Antibiotics will not help a cold. Your doctor may order drugs to help with the signs of a cold. Even though you may feel very sick, the common cold is not a health emergency.         What care is needed at home?   · Ask your doctor what you need to do when you go home. Make sure you ask questions if you do not understand what the doctor says.  · To help you feel better:  ? Use a cool mist humidifier to avoid breathing dry air.  ? Use hard candy or cough drops to soothe sore throat and cough.  ? Gargle with salt water. Mix 1/2 teaspoon (2.5 grams) salt with a cup (240 mL) of warm water a few times a day.  ? Spray saltwater mist in each nostril. Any normal saline spray works.  ? Sip warm liquids to keep your throat moist.  ? Take warm, steamy showers to help soothe the cough.  · Do not smoke or be in smoke-filled places. Avoid things that may cause breathing problems like vaping, fumes, pollution, dust, and other common allergens.  · You may want to use over-the-counter medicines for allergies or acid reflux if your cough is due to one of these problems.  · You can also use an over-the-counter cough medicine.  · Drink plenty of fluids whenever you are thirsty.  What follow-up care is needed?   Your doctor may ask you to make visits to the office to check on your progress. Be sure to keep these visits.  What drugs may be needed?   Your doctor may order drugs to:  · Help a stuffy nose  · Lower a fever  · Help with pain  · Treat an allergy  · Help with cough  Always talk to your doctor before you take any drugs. This  includes over-the-counter (OTC) drugs and herbal supplements. This is very important if you have high blood pressure.  Will physical activity be limited?   Get lots of rest. Sleep when you are feeling tired. Avoid doing tiring activities.  What changes to diet are needed?   · Chicken soup may be helpful. Warm fluids help thin mucus and help the body get rid of it easier.  What problems could happen?   · Colds may cause signs of asthma for people who have asthma.  · Sinus or ear infection  · Lung infections  · Bronchitis  What can be done to prevent this health problem?   · Wash your hands often with soap and water for at least 20 seconds, especially after coughing or sneezing. Alcohol-based hand sanitizers also work to kill the virus.  · If you are sick, cover your mouth and nose with tissue when you cough or sneeze. You can also cough into your elbow. Throw away tissues in the trash and wash your hands after touching used tissues.  · Clean items and surfaces you most often touch like door handles, remotes, phones, or toys. Wipe them with a disinfectant. This can help reduce the spread of infection.  · Do not get too close (kissing, hugging) to people who are sick.  · Do not share towels or hankies with anyone who is sick.  · Stay away from crowded places.  · Keep your hands away from your eyes and nose because the virus can enter easily to those body areas.  · Get a flu shot each year.  When do I need to call the doctor?   · You have chest pain when you cough or trouble breathing.  · You start to cough up blood or yellow or green mucus.  · You have a fever of 100.4°F (38°C) or higher or chills.  · You cough so hard you throw up.  · You are still coughing in 10 days.  Helpful tips   · It is normal that mucus from your runny nose may become thicker and change color. Do not take an antibiotic. Instead, drink more water-based fluids, especially hot tea and soups.  · Most colds go away within a week. If you are still  sick after 14 days, see your doctor.  Teach Back: Helping You Understand   The Teach Back Method helps you understand the information we are giving you. After you talk with the staff, tell them in your own words what you learned. This helps to make sure the staff has described each thing clearly. It also helps to explain things that may have been confusing. Before going home, make sure you can do these:  · I can tell you about my condition.  · I can tell you what may help ease my breathing.  · I can tell you what I can do to help avoid passing the infection to others.  · I can tell you what I will do if I have a fever, chills, or trouble breathing.  Where can I learn more?   American Academy of Family Physicians  https://familydoctor.org/condition/colds-and-the-flu/   American Lung Association  http://www.lung.org/lung-health-and-diseases/lung-disease-lookup/influenza/facts-about-the-common-cold.html   Centers for Disease Control and Prevention  https://www.cdc.gov/features/rhinoviruses/   Kids Health  http://kidshealth.org/en/parents/cold.html?ref=search&WT.ac=frv-j-npcf-ib-jzfgzj-nlf   Last Reviewed Date   2021-06-09  Consumer Information Use and Disclaimer   This information is not specific medical advice and does not replace information you receive from your health care provider. This is only a brief summary of general information. It does NOT include all information about conditions, illnesses, injuries, tests, procedures, treatments, therapies, discharge instructions or life-style choices that may apply to you. You must talk with your health care provider for complete information about your health and treatment options. This information should not be used to decide whether or not to accept your health care providers advice, instructions or recommendations. Only your health care provider has the knowledge and training to provide advice that is right for you.  Copyright   Copyright © 2021 [employer] and its  affiliates and/or licensors. All rights reserved.

## 2022-02-04 NOTE — PATIENT INSTRUCTIONS

## 2022-02-12 ENCOUNTER — CLINICAL SUPPORT (OUTPATIENT)
Dept: CARDIOLOGY | Facility: HOSPITAL | Age: 77
End: 2022-02-12
Payer: MEDICARE

## 2022-02-12 DIAGNOSIS — Z95.818 PRESENCE OF OTHER CARDIAC IMPLANTS AND GRAFTS: ICD-10-CM

## 2022-02-12 PROCEDURE — G2066 INTER DEVC REMOTE 30D: HCPCS | Performed by: INTERNAL MEDICINE

## 2022-03-14 ENCOUNTER — CLINICAL SUPPORT (OUTPATIENT)
Dept: CARDIOLOGY | Facility: HOSPITAL | Age: 77
End: 2022-03-14
Payer: MEDICARE

## 2022-03-14 DIAGNOSIS — Z95.818 PRESENCE OF OTHER CARDIAC IMPLANTS AND GRAFTS: ICD-10-CM

## 2022-03-14 PROCEDURE — 93298 CARDIAC DEVICE CHECK - REMOTE: ICD-10-PCS | Mod: ,,, | Performed by: INTERNAL MEDICINE

## 2022-03-14 PROCEDURE — G2066 INTER DEVC REMOTE 30D: HCPCS | Performed by: INTERNAL MEDICINE

## 2022-03-14 PROCEDURE — 93298 REM INTERROG DEV EVAL SCRMS: CPT | Mod: ,,, | Performed by: INTERNAL MEDICINE

## 2022-03-28 ENCOUNTER — HOSPITAL ENCOUNTER (EMERGENCY)
Facility: HOSPITAL | Age: 77
Discharge: HOME | End: 2022-03-29
Attending: EMERGENCY MEDICINE
Payer: MEDICARE

## 2022-03-28 DIAGNOSIS — J10.1 INFLUENZA A: Primary | ICD-10-CM

## 2022-03-28 DIAGNOSIS — R05.9 COUGH: ICD-10-CM

## 2022-03-28 LAB
ALBUMIN SERPL BCP-MCNC: 3.6 G/DL (ref 3.5–5.2)
ALP SERPL-CCNC: 81 U/L (ref 55–135)
ALT SERPL W/O P-5'-P-CCNC: 11 U/L (ref 10–44)
ANION GAP SERPL CALC-SCNC: 10 MMOL/L (ref 8–16)
AST SERPL-CCNC: 22 U/L (ref 10–40)
BASOPHILS # BLD AUTO: 0.03 K/UL (ref 0–0.2)
BASOPHILS NFR BLD: 0.7 % (ref 0–1.9)
BILIRUB SERPL-MCNC: 0.3 MG/DL (ref 0.1–1)
BILIRUB UR QL STRIP: NEGATIVE
BUN SERPL-MCNC: 13 MG/DL (ref 8–23)
CALCIUM SERPL-MCNC: 9.9 MG/DL (ref 8.7–10.5)
CHLORIDE SERPL-SCNC: 99 MMOL/L (ref 95–110)
CLARITY UR: CLEAR
CO2 SERPL-SCNC: 23 MMOL/L (ref 23–29)
COLOR UR: YELLOW
CREAT SERPL-MCNC: 0.9 MG/DL (ref 0.5–1.4)
CTP QC/QA: YES
DIFFERENTIAL METHOD: ABNORMAL
EOSINOPHIL # BLD AUTO: 0 K/UL (ref 0–0.5)
EOSINOPHIL NFR BLD: 0.5 % (ref 0–8)
ERYTHROCYTE [DISTWIDTH] IN BLOOD BY AUTOMATED COUNT: 15.7 % (ref 11.5–14.5)
EST. GFR  (AFRICAN AMERICAN): >60 ML/MIN/1.73 M^2
EST. GFR  (NON AFRICAN AMERICAN): >60 ML/MIN/1.73 M^2
GLUCOSE SERPL-MCNC: 93 MG/DL (ref 70–110)
GLUCOSE UR QL STRIP: NEGATIVE
HCT VFR BLD AUTO: 37.6 % (ref 37–48.5)
HGB BLD-MCNC: 11.5 G/DL (ref 12–16)
HGB UR QL STRIP: NEGATIVE
IMM GRANULOCYTES # BLD AUTO: 0.02 K/UL (ref 0–0.04)
IMM GRANULOCYTES NFR BLD AUTO: 0.5 % (ref 0–0.5)
INFLUENZA A, MOLECULAR: POSITIVE
INFLUENZA B, MOLECULAR: NEGATIVE
KETONES UR QL STRIP: ABNORMAL
LEUKOCYTE ESTERASE UR QL STRIP: ABNORMAL
LYMPHOCYTES # BLD AUTO: 0.8 K/UL (ref 1–4.8)
LYMPHOCYTES NFR BLD: 19.4 % (ref 18–48)
MCH RBC QN AUTO: 24.9 PG (ref 27–31)
MCHC RBC AUTO-ENTMCNC: 30.6 G/DL (ref 32–36)
MCV RBC AUTO: 81 FL (ref 82–98)
MICROSCOPIC COMMENT: NORMAL
MONOCYTES # BLD AUTO: 0.7 K/UL (ref 0.3–1)
MONOCYTES NFR BLD: 15.9 % (ref 4–15)
NEUTROPHILS # BLD AUTO: 2.7 K/UL (ref 1.8–7.7)
NEUTROPHILS NFR BLD: 63 % (ref 38–73)
NITRITE UR QL STRIP: NEGATIVE
NRBC BLD-RTO: 0 /100 WBC
PH UR STRIP: 7 [PH] (ref 5–8)
PLATELET # BLD AUTO: 210 K/UL (ref 150–450)
PMV BLD AUTO: 11.2 FL (ref 9.2–12.9)
POCT GLUCOSE: 103 MG/DL (ref 70–110)
POTASSIUM SERPL-SCNC: 4.2 MMOL/L (ref 3.5–5.1)
PROT SERPL-MCNC: 7.5 G/DL (ref 6–8.4)
PROT UR QL STRIP: ABNORMAL
RBC # BLD AUTO: 4.62 M/UL (ref 4–5.4)
SARS-COV-2 RDRP RESP QL NAA+PROBE: NEGATIVE
SODIUM SERPL-SCNC: 132 MMOL/L (ref 136–145)
SP GR UR STRIP: 1.01 (ref 1–1.03)
SPECIMEN SOURCE: ABNORMAL
URN SPEC COLLECT METH UR: ABNORMAL
UROBILINOGEN UR STRIP-ACNC: ABNORMAL EU/DL
WBC # BLD AUTO: 4.34 K/UL (ref 3.9–12.7)
WBC #/AREA URNS HPF: 2 /HPF (ref 0–5)

## 2022-03-28 PROCEDURE — 87502 INFLUENZA DNA AMP PROBE: CPT | Performed by: EMERGENCY MEDICINE

## 2022-03-28 PROCEDURE — 63600175 PHARM REV CODE 636 W HCPCS: Performed by: EMERGENCY MEDICINE

## 2022-03-28 PROCEDURE — 81000 URINALYSIS NONAUTO W/SCOPE: CPT | Performed by: EMERGENCY MEDICINE

## 2022-03-28 PROCEDURE — 85025 COMPLETE CBC W/AUTO DIFF WBC: CPT | Performed by: EMERGENCY MEDICINE

## 2022-03-28 PROCEDURE — 82962 GLUCOSE BLOOD TEST: CPT

## 2022-03-28 PROCEDURE — U0002 COVID-19 LAB TEST NON-CDC: HCPCS | Performed by: EMERGENCY MEDICINE

## 2022-03-28 PROCEDURE — 96374 THER/PROPH/DIAG INJ IV PUSH: CPT

## 2022-03-28 PROCEDURE — 99284 EMERGENCY DEPT VISIT MOD MDM: CPT | Mod: 25

## 2022-03-28 PROCEDURE — 80053 COMPREHEN METABOLIC PANEL: CPT | Performed by: EMERGENCY MEDICINE

## 2022-03-28 RX ORDER — KETOROLAC TROMETHAMINE 30 MG/ML
15 INJECTION, SOLUTION INTRAMUSCULAR; INTRAVENOUS
Status: COMPLETED | OUTPATIENT
Start: 2022-03-28 | End: 2022-03-28

## 2022-03-28 RX ADMIN — KETOROLAC TROMETHAMINE 15 MG: 30 INJECTION, SOLUTION INTRAMUSCULAR at 10:03

## 2022-03-29 VITALS
RESPIRATION RATE: 18 BRPM | OXYGEN SATURATION: 100 % | DIASTOLIC BLOOD PRESSURE: 64 MMHG | WEIGHT: 145 LBS | HEART RATE: 94 BPM | BODY MASS INDEX: 25.69 KG/M2 | SYSTOLIC BLOOD PRESSURE: 126 MMHG | TEMPERATURE: 98 F

## 2022-03-29 RX ORDER — OSELTAMIVIR PHOSPHATE 75 MG/1
75 CAPSULE ORAL
Status: DISCONTINUED | OUTPATIENT
Start: 2022-03-29 | End: 2022-03-29 | Stop reason: HOSPADM

## 2022-03-29 RX ORDER — BENZONATATE 100 MG/1
100 CAPSULE ORAL 3 TIMES DAILY PRN
Qty: 20 CAPSULE | Refills: 0 | Status: SHIPPED | OUTPATIENT
Start: 2022-03-29 | End: 2022-04-08

## 2022-03-29 RX ORDER — OSELTAMIVIR PHOSPHATE 75 MG/1
75 CAPSULE ORAL 2 TIMES DAILY
Qty: 10 CAPSULE | Refills: 0 | Status: SHIPPED | OUTPATIENT
Start: 2022-03-29 | End: 2022-04-03

## 2022-03-29 NOTE — DISCHARGE INSTRUCTIONS
You have symptoms that are consistent with influenza. Take your medications as prescribed. You can take ibuprofen 600 mg every 6 hours as needed for fever or body aches and alternate with tylenol 1000 mg every 6 hours as needed for fever and body aches. Drink plenty of fluids. Get plenty of rest. Return to the Emergency Department if you have difficulty breathing, shortness of breath, fever that does not respond to medications or any other concerns. Please refer to the additional material provided for further information.

## 2022-03-29 NOTE — ED PROVIDER NOTES
Chief Complaint: weakness, cough, feeling bad    History of Present Illness:    Autumn Harris 76 y.o. with a  has a past medical history of Anxiety, CKD (chronic kidney disease) stage 3, GFR 30-59 ml/min, COPD (chronic obstructive pulmonary disease), Depression, Encounter for blood transfusion, Fibromyalgia, Hematuria, High cholesterol, Hypertension, Oral cancer, Proteinuria, Sore throat (7/3/2019), Urinary tract infection, and Vitamin D deficiency. who presents to the emergency department today with a complaint of weakness, cough, feeling bad. Cough started 2 days ago. + sputum. Some sore throat. Felt warm at home. Has some pain with urination. She has pain to the upper back to flank bilaterally. No injury. No unilateral leg weakness. Feels fatigued. No vomiting, no diarrhea.     ROS    Constitutional: See above.   Eyes: No discharge. No pain.  HENT: No nasal drainage. No ear ache.   Cardiovascular: No chest pain, no palpitations.  Respiratory: See above.   Gastrointestinal: No abdominal pain, no vomiting. No diarrhea.  Genitourinary: See above.   Musculoskeletal: See above.  Skin: No rashes, no lesions.  Neurological: No focal weakness.    Otherwise remaining ROS negative     The history is provided by the patient      ALLERGIES REVIEWED  MEDICATIONS REVIEWED  PMH/PSH/SOC/FH REVIEWED :  Autumn Harris  has a past medical history of Anxiety, CKD (chronic kidney disease) stage 3, GFR 30-59 ml/min, COPD (chronic obstructive pulmonary disease), Depression, Encounter for blood transfusion, Fibromyalgia, Hematuria, High cholesterol, Hypertension, Oral cancer, Proteinuria, Sore throat (7/3/2019), Urinary tract infection, and Vitamin D deficiency. and   has a past surgical history that includes Hysterectomy; Stomach Ulcer Surgery; Abdominal surgery; Oophorectomy; and Insertion of implantable loop recorder (N/A, 10/15/2021). with  reports that she quit smoking about 26 years ago. She has a 67.50 pack-year smoking history.  She has never used smokeless tobacco. She reports that she does not drink alcohol and does not use drugs. and a family history includes Heart disease in her brother, maternal aunt, and mother; Lung cancer in her brother; Pacemaker/defibrilator in her brother. She was adopted.      Nursing/Ancillary staff note reviewed.  VS reviewed         Physical Exam     /64 (BP Location: Left arm, Patient Position: Sitting)   Pulse 94   Temp 98.2 °F (36.8 °C) (Oral)   Resp 18   Wt 65.8 kg (145 lb)   LMP  (LMP Unknown)   SpO2 100%   BMI 25.69 kg/m²     Physical Exam    General Appearance: The patient is alert, has no immediate need for airway protection and no signs of toxicity. No acute distress. Lying in bed but able to sit up without difficulty.   HEENT: Eyes: Pupils equal and round no pallor or injection. Extra ocular movements intact. No drainage.       Mouth: Mucous membranes are moist. Oropharynx clear.   Neck: Neck is supple non-tender. No lymphadenopathy. No stridor.   Respiratory: There are no retractions, lungs are clear to auscultation. No wheezing, no crackles. Chest wall nontender to palpation.   Cardiovascular: Regular rate and rhythm. No murmurs, rubs or gallops.  Gastrointestinal:  Abdomen is soft and non-tender, no masses, bowel sounds normal. No guarding, no rebound.  No pulsatile mass.   Neurological: Alert and oriented x 4. CN II-XII grossly intact. No focal weakness. Strength intact 5/5 bilaterally in upper and lower extremities.   Skin: Warm and dry, no rashes.  Musculoskeletal: Extremities are non-tender, non-swollen and have full range of motion. Back NTTP along the midline.     DIFFERENTIAL DIAGNOSIS: After history and physical exam a differential diagnosis was considered, but was not limited to, bronchitis, URI, cough, laryngitis, tracheitis, asthma, sinusitis, pneumonia, viral, COPD, medication side effect.        I independently interpreted the lab results and it showed the following: Flu  A +,       Labs Reviewed   INFLUENZA A & B BY MOLECULAR - Abnormal; Notable for the following components:       Result Value    Influenza A, Molecular Positive (*)     All other components within normal limits   CBC W/ AUTO DIFFERENTIAL - Abnormal; Notable for the following components:    Hemoglobin 11.5 (*)     MCV 81 (*)     MCH 24.9 (*)     MCHC 30.6 (*)     RDW 15.7 (*)     Lymph # 0.8 (*)     Mono % 15.9 (*)     All other components within normal limits   COMPREHENSIVE METABOLIC PANEL - Abnormal; Notable for the following components:    Sodium 132 (*)     All other components within normal limits   URINALYSIS, REFLEX TO URINE CULTURE - Abnormal; Notable for the following components:    Protein, UA Trace (*)     Ketones, UA 1+ (*)     Urobilinogen, UA 2.0-3.0 (*)     Leukocytes, UA Trace (*)     All other components within normal limits    Narrative:     Specimen Source->Urine   URINALYSIS MICROSCOPIC    Narrative:     Specimen Source->Urine   SARS-COV-2 RDRP GENE   POCT GLUCOSE         Reviewed by myself, read by radiology.     X-Ray Chest 1 View   Final Result      No acute cardiopulmonary abnormality.         Electronically signed by: Louis Scott   Date:    03/29/2022   Time:    00:12                ED Course     Medications   ketorolac injection 15 mg (15 mg Intravenous Given 3/28/22 2242)         ED Course as of 03/29/22 0539   Mon Mar 28, 2022   2331 SARS-CoV-2 RNA, Amplification, Qual: Negative [JA]   2331 Influenza A, Molecular(!): Positive [JA]      ED Course User Index  [JA] Tammie Sanford MD              Medical Decision Making:   History:   I obtained history from:  The patient  Old Medical Records: I decided to obtain old medical records.       Clinical Tests:   I have ordered and independently interpreted Lab Tests: Ordered and Reviewed  Radiological Study: Ordered and Reviewed          ED Management:  Autumn Harris  presents to the emergency Department today with flu like symptoms. She has  flu A. No sign of pneumonia on CXR. Feeling improved. Will write her for tamiflu. Discussed symptom control. Discussed warning signs for return. The pt is comfortable with this plan and comfortable going home at this time. After taking into careful account the historical factors and physical exam findings of the patient's presentation today, in conjunction with the empirical and objective data obtained on ED workup, no acute emergent medical condition requiring admission has been identified. The patient appears to be low risk for an emergent medical condition and I feel it is safe and appropriate at this time for the patient to be discharged to follow-up as detailed in their discharge instructions for reevaluation and possible continued outpatient workup and management. Regardless, an unremarkable evaluation in the ED does not preclude the development or presence of a serious or life threatening condition. As such, patient was instructed to return immediately for any worsening or change in current symptoms. Precautions for return discussed at length.  Discharge and follow-up instructions discussed with the patient who expressed understanding and willingness to comply with my recommendations.    Voice recognition software utilized in this note.              Impression      The primary encounter diagnosis was Influenza A. A diagnosis of Cough was also pertinent to this visit.                Discharge Medication List as of 3/29/2022 12:21 AM      START taking these medications    Details   benzonatate (TESSALON) 100 MG capsule Take 1 capsule (100 mg total) by mouth 3 (three) times daily as needed for Cough., Starting Tue 3/29/2022, Until Fri 4/8/2022 at 2359, Normal      oseltamivir (TAMIFLU) 75 MG capsule Take 1 capsule (75 mg total) by mouth 2 (two) times daily. for 5 days, Starting Tue 3/29/2022, Until Sun 4/3/2022, Normal              Follow-up Information     Tonya Vega MD. Schedule an appointment as soon  as possible for a visit in 2 days.    Specialty: Endocrinology  Contact information:  [address] [place] [address] [id number] [phone]                                      Tammie Sanford MD  03/29/22 8917

## 2022-04-13 ENCOUNTER — CLINICAL SUPPORT (OUTPATIENT)
Dept: CARDIOLOGY | Facility: HOSPITAL | Age: 77
End: 2022-04-13
Payer: MEDICARE

## 2022-04-13 DIAGNOSIS — Z95.818 PRESENCE OF OTHER CARDIAC IMPLANTS AND GRAFTS: ICD-10-CM

## 2022-04-13 PROCEDURE — G2066 INTER DEVC REMOTE 30D: HCPCS | Performed by: INTERNAL MEDICINE

## 2022-04-21 ENCOUNTER — TELEPHONE (OUTPATIENT)
Dept: ELECTROPHYSIOLOGY | Facility: CLINIC | Age: 77
End: 2022-04-21
Payer: MEDICARE

## 2022-04-21 NOTE — TELEPHONE ENCOUNTER
[employer] alert received for tachy event lasting 5 secs on 4/20/22 @ 1044 AM. EGM shows possible SR w/ nsVT vs abberancy. Left VM on phone number listed in chart with clinic phone number for patient to return call.

## 2022-04-25 NOTE — TELEPHONE ENCOUNTER
[facility] contacted [facility] on this am and stated she was returning a call.  (please see telephone note from 4/21/22). Pt stated that she is unable to recall what day last week but she felt what she calls is chest pain on the left side of her heart that happened a few weeks in a row. Pt stated she had oral cancer in 1995 and just recently had to have something biopsied and she is due to see the doctor this week to discuss the findings and that has had her understandably anxious and unsure of much else.  Informed the pt that the report in question reveals a ST that lasted 5 secs.  Understanding was verbalized.  Pt appreciated the call.

## 2022-05-13 ENCOUNTER — CLINICAL SUPPORT (OUTPATIENT)
Dept: CARDIOLOGY | Facility: HOSPITAL | Age: 77
End: 2022-05-13
Payer: MEDICARE

## 2022-05-13 DIAGNOSIS — Z95.818 PRESENCE OF OTHER CARDIAC IMPLANTS AND GRAFTS: ICD-10-CM

## 2022-05-13 PROCEDURE — G2066 INTER DEVC REMOTE 30D: HCPCS | Performed by: INTERNAL MEDICINE

## 2022-05-13 PROCEDURE — 93298 CARDIAC DEVICE CHECK - REMOTE: ICD-10-PCS | Mod: ,,, | Performed by: INTERNAL MEDICINE

## 2022-05-13 PROCEDURE — 93298 REM INTERROG DEV EVAL SCRMS: CPT | Mod: ,,, | Performed by: INTERNAL MEDICINE

## 2022-06-12 ENCOUNTER — CLINICAL SUPPORT (OUTPATIENT)
Dept: CARDIOLOGY | Facility: HOSPITAL | Age: 77
End: 2022-06-12
Payer: MEDICARE

## 2022-06-12 DIAGNOSIS — Z95.818 PRESENCE OF OTHER CARDIAC IMPLANTS AND GRAFTS: ICD-10-CM

## 2022-06-12 PROCEDURE — G2066 INTER DEVC REMOTE 30D: HCPCS | Performed by: INTERNAL MEDICINE

## 2022-07-12 ENCOUNTER — CLINICAL SUPPORT (OUTPATIENT)
Dept: CARDIOLOGY | Facility: HOSPITAL | Age: 77
End: 2022-07-12
Payer: MEDICARE

## 2022-07-12 DIAGNOSIS — Z95.818 PRESENCE OF OTHER CARDIAC IMPLANTS AND GRAFTS: ICD-10-CM

## 2022-07-12 PROCEDURE — 93298 REM INTERROG DEV EVAL SCRMS: CPT | Mod: ,,, | Performed by: INTERNAL MEDICINE

## 2022-07-12 PROCEDURE — 93298 CARDIAC DEVICE CHECK - REMOTE: ICD-10-PCS | Mod: ,,, | Performed by: INTERNAL MEDICINE

## 2022-07-12 PROCEDURE — G2066 INTER DEVC REMOTE 30D: HCPCS | Performed by: INTERNAL MEDICINE

## 2022-07-19 ENCOUNTER — PATIENT MESSAGE (OUTPATIENT)
Dept: RESEARCH | Facility: CLINIC | Age: 77
End: 2022-07-19
Payer: MEDICARE

## 2022-08-11 ENCOUNTER — CLINICAL SUPPORT (OUTPATIENT)
Dept: CARDIOLOGY | Facility: HOSPITAL | Age: 77
End: 2022-08-11
Payer: MEDICARE

## 2022-08-11 DIAGNOSIS — Z95.818 PRESENCE OF OTHER CARDIAC IMPLANTS AND GRAFTS: ICD-10-CM

## 2022-08-11 PROCEDURE — G2066 INTER DEVC REMOTE 30D: HCPCS | Performed by: INTERNAL MEDICINE

## 2022-09-10 ENCOUNTER — CLINICAL SUPPORT (OUTPATIENT)
Dept: CARDIOLOGY | Facility: HOSPITAL | Age: 77
End: 2022-09-10
Payer: MEDICARE

## 2022-09-10 DIAGNOSIS — Z95.818 PRESENCE OF OTHER CARDIAC IMPLANTS AND GRAFTS: ICD-10-CM

## 2022-09-10 PROCEDURE — G2066 INTER DEVC REMOTE 30D: HCPCS | Performed by: INTERNAL MEDICINE

## 2022-09-10 PROCEDURE — 93298 CARDIAC DEVICE CHECK - REMOTE: ICD-10-PCS | Mod: ,,, | Performed by: INTERNAL MEDICINE

## 2022-09-10 PROCEDURE — 93298 REM INTERROG DEV EVAL SCRMS: CPT | Mod: ,,, | Performed by: INTERNAL MEDICINE

## 2022-10-10 ENCOUNTER — CLINICAL SUPPORT (OUTPATIENT)
Dept: CARDIOLOGY | Facility: HOSPITAL | Age: 77
End: 2022-10-10
Payer: MEDICARE

## 2022-10-10 DIAGNOSIS — Z95.818 PRESENCE OF OTHER CARDIAC IMPLANTS AND GRAFTS: ICD-10-CM

## 2022-10-10 PROCEDURE — G2066 INTER DEVC REMOTE 30D: HCPCS | Performed by: INTERNAL MEDICINE

## 2022-11-08 ENCOUNTER — HOSPITAL ENCOUNTER (INPATIENT)
Facility: HOSPITAL | Age: 77
LOS: 1 days | Discharge: HOME HEALTH | DRG: 516 | End: 2022-11-10
Attending: EMERGENCY MEDICINE | Admitting: PODIATRIST
Payer: MEDICARE

## 2022-11-08 DIAGNOSIS — S92.911B: ICD-10-CM

## 2022-11-08 DIAGNOSIS — S92.521B OPEN DISPLACED FRACTURE OF MIDDLE PHALANX OF LESSER TOE OF RIGHT FOOT, INITIAL ENCOUNTER: Primary | ICD-10-CM

## 2022-11-08 DIAGNOSIS — Z98.890 POSTOPERATIVE STATE: ICD-10-CM

## 2022-11-08 DIAGNOSIS — M79.673 FOOT PAIN: ICD-10-CM

## 2022-11-08 LAB
ALBUMIN SERPL BCP-MCNC: 4 G/DL (ref 3.5–5.2)
ALP SERPL-CCNC: 95 U/L (ref 55–135)
ALT SERPL W/O P-5'-P-CCNC: 11 U/L (ref 10–44)
ANION GAP SERPL CALC-SCNC: 11 MMOL/L (ref 8–16)
AST SERPL-CCNC: 29 U/L (ref 10–40)
BASOPHILS # BLD AUTO: 0.12 K/UL (ref 0–0.2)
BASOPHILS NFR BLD: 1.6 % (ref 0–1.9)
BILIRUB SERPL-MCNC: 0.3 MG/DL (ref 0.1–1)
BUN SERPL-MCNC: 18 MG/DL (ref 8–23)
CALCIUM SERPL-MCNC: 10.1 MG/DL (ref 8.7–10.5)
CHLORIDE SERPL-SCNC: 110 MMOL/L (ref 95–110)
CO2 SERPL-SCNC: 14 MMOL/L (ref 23–29)
CREAT SERPL-MCNC: 1.4 MG/DL (ref 0.5–1.4)
CRP SERPL-MCNC: 12.9 MG/L (ref 0–8.2)
DIFFERENTIAL METHOD: ABNORMAL
EOSINOPHIL # BLD AUTO: 0.3 K/UL (ref 0–0.5)
EOSINOPHIL NFR BLD: 3.6 % (ref 0–8)
ERYTHROCYTE [DISTWIDTH] IN BLOOD BY AUTOMATED COUNT: 15.9 % (ref 11.5–14.5)
ERYTHROCYTE [SEDIMENTATION RATE] IN BLOOD BY PHOTOMETRIC METHOD: 49 MM/HR (ref 0–36)
EST. GFR  (NO RACE VARIABLE): 39 ML/MIN/1.73 M^2
GLUCOSE SERPL-MCNC: 126 MG/DL (ref 70–110)
HCT VFR BLD AUTO: 38.4 % (ref 37–48.5)
HGB BLD-MCNC: 11.8 G/DL (ref 12–16)
IMM GRANULOCYTES # BLD AUTO: 0.01 K/UL (ref 0–0.04)
IMM GRANULOCYTES NFR BLD AUTO: 0.1 % (ref 0–0.5)
LYMPHOCYTES # BLD AUTO: 1.6 K/UL (ref 1–4.8)
LYMPHOCYTES NFR BLD: 21.4 % (ref 18–48)
MCH RBC QN AUTO: 25.1 PG (ref 27–31)
MCHC RBC AUTO-ENTMCNC: 30.7 G/DL (ref 32–36)
MCV RBC AUTO: 82 FL (ref 82–98)
MONOCYTES # BLD AUTO: 0.7 K/UL (ref 0.3–1)
MONOCYTES NFR BLD: 9.1 % (ref 4–15)
NEUTROPHILS # BLD AUTO: 4.8 K/UL (ref 1.8–7.7)
NEUTROPHILS NFR BLD: 64.2 % (ref 38–73)
NRBC BLD-RTO: 0 /100 WBC
PLATELET # BLD AUTO: 287 K/UL (ref 150–450)
PMV BLD AUTO: 10.7 FL (ref 9.2–12.9)
POTASSIUM SERPL-SCNC: 4.8 MMOL/L (ref 3.5–5.1)
PROT SERPL-MCNC: 8.2 G/DL (ref 6–8.4)
RBC # BLD AUTO: 4.71 M/UL (ref 4–5.4)
SODIUM SERPL-SCNC: 135 MMOL/L (ref 136–145)
WBC # BLD AUTO: 7.4 K/UL (ref 3.9–12.7)

## 2022-11-08 PROCEDURE — 85652 RBC SED RATE AUTOMATED: CPT | Performed by: EMERGENCY MEDICINE

## 2022-11-08 PROCEDURE — 25000003 PHARM REV CODE 250: Performed by: EMERGENCY MEDICINE

## 2022-11-08 PROCEDURE — 86140 C-REACTIVE PROTEIN: CPT | Performed by: EMERGENCY MEDICINE

## 2022-11-08 PROCEDURE — 96375 TX/PRO/DX INJ NEW DRUG ADDON: CPT

## 2022-11-08 PROCEDURE — 80053 COMPREHEN METABOLIC PANEL: CPT | Performed by: EMERGENCY MEDICINE

## 2022-11-08 PROCEDURE — 96365 THER/PROPH/DIAG IV INF INIT: CPT

## 2022-11-08 PROCEDURE — 85025 COMPLETE CBC W/AUTO DIFF WBC: CPT | Performed by: EMERGENCY MEDICINE

## 2022-11-08 PROCEDURE — 96360 HYDRATION IV INFUSION INIT: CPT | Mod: 59

## 2022-11-08 PROCEDURE — 99285 EMERGENCY DEPT VISIT HI MDM: CPT | Mod: 25

## 2022-11-08 PROCEDURE — 63600175 PHARM REV CODE 636 W HCPCS: Performed by: EMERGENCY MEDICINE

## 2022-11-08 PROCEDURE — 96361 HYDRATE IV INFUSION ADD-ON: CPT

## 2022-11-08 RX ORDER — BENZONATATE 100 MG/1
1 CAPSULE ORAL 3 TIMES DAILY PRN
COMMUNITY
Start: 2022-03-29 | End: 2022-11-08

## 2022-11-08 RX ORDER — CLINDAMYCIN PHOSPHATE 900 MG/50ML
900 INJECTION, SOLUTION INTRAVENOUS
Status: COMPLETED | OUTPATIENT
Start: 2022-11-08 | End: 2022-11-08

## 2022-11-08 RX ORDER — CLINDAMYCIN PHOSPHATE 900 MG/50ML
900 INJECTION, SOLUTION INTRAVENOUS
Status: DISCONTINUED | OUTPATIENT
Start: 2022-11-09 | End: 2022-11-10 | Stop reason: HOSPADM

## 2022-11-08 RX ORDER — METHYLPREDNISOLONE 4 MG/1
TABLET ORAL
COMMUNITY
Start: 2022-07-13 | End: 2022-11-08

## 2022-11-08 RX ORDER — TRAMADOL HYDROCHLORIDE 50 MG/1
50 TABLET ORAL EVERY 6 HOURS PRN
COMMUNITY
End: 2022-11-08 | Stop reason: ALTCHOICE

## 2022-11-08 RX ORDER — MORPHINE SULFATE 2 MG/ML
2 INJECTION, SOLUTION INTRAMUSCULAR; INTRAVENOUS EVERY 4 HOURS PRN
Status: COMPLETED | OUTPATIENT
Start: 2022-11-08 | End: 2022-11-09

## 2022-11-08 RX ORDER — TIZANIDINE 4 MG/1
4 TABLET ORAL EVERY 6 HOURS PRN
Status: ON HOLD | COMMUNITY
Start: 2022-08-02 | End: 2022-11-10 | Stop reason: HOSPADM

## 2022-11-08 RX ORDER — SODIUM CHLORIDE, SODIUM LACTATE, POTASSIUM CHLORIDE, CALCIUM CHLORIDE 600; 310; 30; 20 MG/100ML; MG/100ML; MG/100ML; MG/100ML
INJECTION, SOLUTION INTRAVENOUS CONTINUOUS
Status: DISCONTINUED | OUTPATIENT
Start: 2022-11-08 | End: 2022-11-10 | Stop reason: HOSPADM

## 2022-11-08 RX ORDER — MORPHINE SULFATE 4 MG/ML
4 INJECTION, SOLUTION INTRAMUSCULAR; INTRAVENOUS EVERY 4 HOURS PRN
Status: DISCONTINUED | OUTPATIENT
Start: 2022-11-08 | End: 2022-11-08

## 2022-11-08 RX ORDER — ACARBOSE 25 MG/1
25 TABLET ORAL 3 TIMES DAILY
COMMUNITY
Start: 2022-10-18 | End: 2022-11-09

## 2022-11-08 RX ORDER — SODIUM, POTASSIUM,MAG SULFATES 17.5-3.13G
SOLUTION, RECONSTITUTED, ORAL ORAL
COMMUNITY
Start: 2022-05-24 | End: 2022-11-08

## 2022-11-08 RX ORDER — LORATADINE 10 MG/1
10 TABLET ORAL DAILY PRN
COMMUNITY
Start: 2022-07-13 | End: 2022-11-08

## 2022-11-08 RX ORDER — MECLIZINE HCL 25MG 25 MG/1
25 TABLET, CHEWABLE ORAL 2 TIMES DAILY PRN
COMMUNITY
Start: 2022-08-16 | End: 2022-11-08

## 2022-11-08 RX ORDER — LIDOCAINE HYDROCHLORIDE 10 MG/ML
5 INJECTION, SOLUTION EPIDURAL; INFILTRATION; INTRACAUDAL; PERINEURAL
Status: COMPLETED | OUTPATIENT
Start: 2022-11-08 | End: 2022-11-08

## 2022-11-08 RX ORDER — ERYTHROMYCIN 5 MG/G
OINTMENT OPHTHALMIC 2 TIMES DAILY
COMMUNITY
Start: 2022-10-20 | End: 2022-11-09

## 2022-11-08 RX ORDER — FENTANYL CITRATE 50 UG/ML
50 INJECTION, SOLUTION INTRAMUSCULAR; INTRAVENOUS
Status: DISCONTINUED | OUTPATIENT
Start: 2022-11-08 | End: 2022-11-08

## 2022-11-08 RX ADMIN — FENTANYL CITRATE 50 MCG: 50 INJECTION INTRAMUSCULAR; INTRAVENOUS at 05:11

## 2022-11-08 RX ADMIN — SODIUM CHLORIDE, SODIUM LACTATE, POTASSIUM CHLORIDE, AND CALCIUM CHLORIDE: .6; .31; .03; .02 INJECTION, SOLUTION INTRAVENOUS at 10:11

## 2022-11-08 RX ADMIN — MORPHINE SULFATE 2 MG: 2 INJECTION, SOLUTION INTRAMUSCULAR; INTRAVENOUS at 10:11

## 2022-11-08 RX ADMIN — LIDOCAINE HYDROCHLORIDE 50 MG: 10 INJECTION, SOLUTION EPIDURAL; INFILTRATION; INTRACAUDAL; PERINEURAL at 03:11

## 2022-11-08 RX ADMIN — CLINDAMYCIN IN 5 PERCENT DEXTROSE 900 MG: 18 INJECTION, SOLUTION INTRAVENOUS at 03:11

## 2022-11-08 NOTE — CONSULTS
[facility] Consult Note     Chief Complaint:  open fracture right 2nd phalanx, toe     HPI:  Orthopedic surgery was consulted for open toe fracture. This patient is a 76yo F who presented with a right 2nd toe fracture secondary to hitting toe on a thick rug on Sunday evening. She initially presented to an urgent care but was referred to [facility] for podiatry evaluation. She states she has been undergoing workup for diabetes with her primary care physician, she does have multiple other medical comorbidities listed below. She denies fevers, chills.      PMH:    Past Medical History:   Diagnosis Date    Anxiety     CKD (chronic kidney disease) stage 3, GFR 30-59 ml/min     COPD (chronic obstructive pulmonary disease)     Depression     Encounter for blood transfusion     Fibromyalgia     Hematuria     High cholesterol     Hypertension     Oral cancer     Proteinuria     Sore throat 7/3/2019    Urinary tract infection     Vitamin D deficiency      PSH:    Past Surgical History:   Procedure Laterality Date    ABDOMINAL SURGERY      [facility]    HYSTERECTOMY      INSERTION OF IMPLANTABLE LOOP RECORDER N/A 10/15/2021    Procedure: Insertion, Implantable Loop Recorder;  Surgeon: Cristhian Alvares MD;  Location: [facility] CATH LAB/EP;  Service: Cardiology;  Laterality: N/A;    OOPHORECTOMY      only one removed    Stomach Ulcer Surgery       FH:  Noncontributory  SH: former smoker  Denies drug use    Meds:    No current facility-administered medications on file prior to encounter.     Current Outpatient Medications on File Prior to Encounter   Medication Sig Dispense Refill    albuterol (VENTOLIN HFA) 90 mcg/actuation inhaler Inhale 2 puffs into the lungs every 6 (six) hours as needed for Wheezing. Rescue 18 g 0    azelastine (ASTELIN) 137 mcg (0.1 %) nasal spray 2 SPRAY(S) INTO EACH NOSTRIL TWICE A DAY  5    blood sugar diagnostic Strp 1 strip by Misc.(Non-Drug; Combo Route) route 3 (three) times daily as needed. 30 each 3     blood-glucose meter kit Use as instructed 1 each 0    busPIRone (BUSPAR) 5 MG Tab Take 5 mg by mouth 2 (two) times daily.      clorazepate (TRANXENE) 3.75 MG Tab Take 7.5 mg by mouth 2 (two) times daily.       diltiaZEM (CARDIZEM) 120 MG tablet Take 120 mg by mouth once daily.      ergocalciferol (ERGOCALCIFEROL) 50,000 unit Cap Take 50,000 Units by mouth every 7 days.      fluticasone (FLONASE) 50 mcg/actuation nasal spray 2 sprays by Each Nostril route Daily.  0    levocetirizine (XYZAL) 5 MG tablet Take 1 tablet (5 mg total) by mouth every evening. 30 tablet 11    montelukast (SINGULAIR) 10 mg tablet Take 10 mg by mouth once daily.  11    pantoprazole (PROTONIX) 40 MG tablet TAKE 1 TABLET BY MOUTH EVERY DAY 90 tablet 3    PROCTOSOL HC 2.5 % rectal cream 2 (two) times daily as needed.       spironolactone (ALDACTONE) 25 MG tablet TAKE 1 TABLET BY MOUTH EVERY DAY 90 tablet 3    tramadol-acetaminophen 37.5-325 mg (ULTRACET) 37.5-325 mg Tab Take 1 tablet by mouth 3 (three) times daily.      triamcinolone acetonide 0.1% (KENALOG) 0.1 % cream APPLY TO AFFECTED AREA TWICE A DAY TO RASH ON HAND AND ANKLE AS NEEDED         Allergies:    Review of patient's allergies indicates:   Allergen Reactions    Pcn [penicillins]     Sulfa (sulfonamide antibiotics)         ROS:  otherwise negative except indicated in HPI      Exam:  Vitals:  /85 (BP Location: Right arm, Patient Position: Sitting)   Pulse 97   Temp 98.3 °F (36.8 °C) (Oral)   Resp 18   Wt 65.8 kg (145 lb)   LMP  (LMP Unknown)   SpO2 97%   BMI 25.69 kg/m²   Gen:  Awake and alert, NAD  Resp: No increased WOB    RLE  Open wound overlying middle phalanx of second toe with ecchymotic skin, venous congestion, exposed bone with dried blood and gross debris  Able to feel light touch along 4 other toes, midfoot, ankle  Able to dorsiflex and plantarflex the ankle, EHL/FHL intact gross motor  Limited 2nd toe ROM 2/2 known fracture and pain  DP pulse palpable          Labs:  Recent Labs   Lab 11/08/22  1418   WBC 7.40   HGB 11.8*   HCT 38.4      MCV 82   RDW 15.9*   *   K 4.8      CO2 14*   BUN 18   CREATININE 1.4   *   PROT 8.2   ALBUMIN 4.0   BILITOT 0.3   AST 29   ALKPHOS 95   ALT 11       Imaging:  XR right foot demonstrates displaced 2nd toe middle phalanx fracture with overlying soft tissue disruption     Assessment/Plan:  76yo F with questionable history of diabetes, CKD III, COPD, htn, oral cancer, who presents with right second middle toe phalanx open fracture secondary to mechanical fall on rug on 11/6/22.    - recommend formal podiatry evaluation for toe salvage procedure versus amputation, recommend broad spectrum antibiotics  -no further orthopedic surgery intervention  - NW[facility] ADA [facility] per MARY West MD

## 2022-11-08 NOTE — FIRST PROVIDER EVALUATION
Emergency Department TeleTriage Encounter Note      CHIEF COMPLAINT    Chief Complaint   Patient presents with    Toe Injury     Right 2nd toe pain swelling and wound present after hitting toe on thick rug on sun, + pain and swelling noted        VITAL SIGNS   Initial Vitals [11/08/22 1323]   BP Pulse Resp Temp SpO2   139/85 97 18 98.3 °F (36.8 °C) 97 %      MAP       --            ALLERGIES    Review of patient's allergies indicates:   Allergen Reactions    Pcn [penicillins]     Sulfa (sulfonamide antibiotics)        PROVIDER TRIAGE NOTE  This is a teletriage evaluation of a 77 y.o. female presenting to the ED complaining of right toe injury on Saturday - went to urgent care but patient reports no xray was taken.     Initial orders will be placed and care will be transferred to an alternate provider when patient is roomed for a full evaluation. Any additional orders and the final disposition will be determined by that provider.         ORDERS  Labs Reviewed - No data to display    ED Orders (720h ago, onward)      Start Ordered     Status Ordering Provider    11/08/22 1330 11/08/22 1329  X-Ray Foot Complete Right  1 time imaging         Ordered PRASANNA GASCA              Virtual Visit Note: The provider triage portion of this emergency department evaluation and documentation was performed via [employer], a HIPAA-compliant telemedicine application, in concert with a tele-presenter in the room. A face to face patient evaluation with one of my colleagues will occur once the patient is placed in an emergency department room.      DISCLAIMER: This note was prepared with [employer] voice recognition transcription software. Garbled syntax, mangled pronouns, and other bizarre constructions may be attributed to that software system.

## 2022-11-09 ENCOUNTER — ANESTHESIA EVENT (OUTPATIENT)
Dept: SURGERY | Facility: HOSPITAL | Age: 77
DRG: 516 | End: 2022-11-09
Payer: MEDICARE

## 2022-11-09 ENCOUNTER — ANESTHESIA (OUTPATIENT)
Dept: SURGERY | Facility: HOSPITAL | Age: 77
DRG: 516 | End: 2022-11-09
Payer: MEDICARE

## 2022-11-09 ENCOUNTER — CLINICAL SUPPORT (OUTPATIENT)
Dept: CARDIOLOGY | Facility: HOSPITAL | Age: 77
End: 2022-11-09
Payer: MEDICARE

## 2022-11-09 DIAGNOSIS — Z95.818 PRESENCE OF OTHER CARDIAC IMPLANTS AND GRAFTS: ICD-10-CM

## 2022-11-09 PROBLEM — S92.911B: Status: ACTIVE | Noted: 2022-11-09

## 2022-11-09 PROBLEM — S92.521B: Status: ACTIVE | Noted: 2022-11-09

## 2022-11-09 PROCEDURE — 25000003 PHARM REV CODE 250: Performed by: NURSE ANESTHETIST, CERTIFIED REGISTERED

## 2022-11-09 PROCEDURE — 87206 SMEAR FLUORESCENT/ACID STAI: CPT | Performed by: PODIATRIST

## 2022-11-09 PROCEDURE — 71000015 HC POSTOP RECOV 1ST HR: Performed by: PODIATRIST

## 2022-11-09 PROCEDURE — 93298 REM INTERROG DEV EVAL SCRMS: CPT | Mod: ,,, | Performed by: INTERNAL MEDICINE

## 2022-11-09 PROCEDURE — 88311 PR  DECALCIFY TISSUE: ICD-10-PCS | Mod: 26,,, | Performed by: PATHOLOGY

## 2022-11-09 PROCEDURE — 63600175 PHARM REV CODE 636 W HCPCS: Performed by: EMERGENCY MEDICINE

## 2022-11-09 PROCEDURE — 96376 TX/PRO/DX INJ SAME DRUG ADON: CPT

## 2022-11-09 PROCEDURE — 25000003 PHARM REV CODE 250: Performed by: PODIATRIST

## 2022-11-09 PROCEDURE — 87116 MYCOBACTERIA CULTURE: CPT | Performed by: PODIATRIST

## 2022-11-09 PROCEDURE — D9220A PRA ANESTHESIA: ICD-10-PCS | Mod: ANES,,, | Performed by: STUDENT IN AN ORGANIZED HEALTH CARE EDUCATION/TRAINING PROGRAM

## 2022-11-09 PROCEDURE — G0378 HOSPITAL OBSERVATION PER HR: HCPCS

## 2022-11-09 PROCEDURE — 37000008 HC ANESTHESIA 1ST 15 MINUTES: Performed by: PODIATRIST

## 2022-11-09 PROCEDURE — 99204 PR OFFICE/OUTPT VISIT, NEW, LEVL IV, 45-59 MIN: ICD-10-PCS | Mod: 57,,, | Performed by: PODIATRIST

## 2022-11-09 PROCEDURE — D9220A PRA ANESTHESIA: Mod: ANES,,, | Performed by: STUDENT IN AN ORGANIZED HEALTH CARE EDUCATION/TRAINING PROGRAM

## 2022-11-09 PROCEDURE — 96365 THER/PROPH/DIAG IV INF INIT: CPT | Mod: 59

## 2022-11-09 PROCEDURE — 93298 CARDIAC DEVICE CHECK - REMOTE: ICD-10-PCS | Mod: ,,, | Performed by: INTERNAL MEDICINE

## 2022-11-09 PROCEDURE — 88305 TISSUE EXAM BY PATHOLOGIST: CPT | Performed by: PATHOLOGY

## 2022-11-09 PROCEDURE — 88311 DECALCIFY TISSUE: CPT | Performed by: PATHOLOGY

## 2022-11-09 PROCEDURE — 88311 DECALCIFY TISSUE: CPT | Mod: 26,,, | Performed by: PATHOLOGY

## 2022-11-09 PROCEDURE — 87070 CULTURE OTHR SPECIMN AEROBIC: CPT | Performed by: PODIATRIST

## 2022-11-09 PROCEDURE — 63600175 PHARM REV CODE 636 W HCPCS: Performed by: NURSE ANESTHETIST, CERTIFIED REGISTERED

## 2022-11-09 PROCEDURE — 37000009 HC ANESTHESIA EA ADD 15 MINS: Performed by: PODIATRIST

## 2022-11-09 PROCEDURE — 51798 US URINE CAPACITY MEASURE: CPT

## 2022-11-09 PROCEDURE — G2066 INTER DEVC REMOTE 30D: HCPCS | Performed by: INTERNAL MEDICINE

## 2022-11-09 PROCEDURE — 99900035 HC TECH TIME PER 15 MIN (STAT)

## 2022-11-09 PROCEDURE — 63600175 PHARM REV CODE 636 W HCPCS: Performed by: PODIATRIST

## 2022-11-09 PROCEDURE — 87102 FUNGUS ISOLATION CULTURE: CPT | Performed by: PODIATRIST

## 2022-11-09 PROCEDURE — 96366 THER/PROPH/DIAG IV INF ADDON: CPT

## 2022-11-09 PROCEDURE — D9220A PRA ANESTHESIA: Mod: CRNA,,, | Performed by: NURSE ANESTHETIST, CERTIFIED REGISTERED

## 2022-11-09 PROCEDURE — 96361 HYDRATE IV INFUSION ADD-ON: CPT

## 2022-11-09 PROCEDURE — 36000707: Performed by: PODIATRIST

## 2022-11-09 PROCEDURE — 87176 TISSUE HOMOGENIZATION CULTR: CPT | Performed by: PODIATRIST

## 2022-11-09 PROCEDURE — 94760 N-INVAS EAR/PLS OXIMETRY 1: CPT

## 2022-11-09 PROCEDURE — 87186 SC STD MICRODIL/AGAR DIL: CPT | Performed by: PODIATRIST

## 2022-11-09 PROCEDURE — 87077 CULTURE AEROBIC IDENTIFY: CPT | Performed by: PODIATRIST

## 2022-11-09 PROCEDURE — 25000003 PHARM REV CODE 250: Performed by: EMERGENCY MEDICINE

## 2022-11-09 PROCEDURE — 88305 TISSUE EXAM BY PATHOLOGIST: ICD-10-PCS | Mod: 26,,, | Performed by: PATHOLOGY

## 2022-11-09 PROCEDURE — 28160 PR RESEC TOE AT I-P JT,SINGLE,EA: ICD-10-PCS | Mod: T6,,, | Performed by: PODIATRIST

## 2022-11-09 PROCEDURE — D9220A PRA ANESTHESIA: ICD-10-PCS | Mod: CRNA,,, | Performed by: NURSE ANESTHETIST, CERTIFIED REGISTERED

## 2022-11-09 PROCEDURE — 28160 PARTIAL REMOVAL OF TOE: CPT | Mod: T6,,, | Performed by: PODIATRIST

## 2022-11-09 PROCEDURE — 99204 OFFICE O/P NEW MOD 45 MIN: CPT | Mod: 57,,, | Performed by: PODIATRIST

## 2022-11-09 PROCEDURE — 88305 TISSUE EXAM BY PATHOLOGIST: CPT | Mod: 26,,, | Performed by: PATHOLOGY

## 2022-11-09 PROCEDURE — 87075 CULTR BACTERIA EXCEPT BLOOD: CPT | Performed by: PODIATRIST

## 2022-11-09 PROCEDURE — 36000706: Performed by: PODIATRIST

## 2022-11-09 PROCEDURE — 87205 SMEAR GRAM STAIN: CPT | Performed by: PODIATRIST

## 2022-11-09 RX ORDER — ACETAMINOPHEN 325 MG/1
650 TABLET ORAL EVERY 6 HOURS PRN
Status: DISCONTINUED | OUTPATIENT
Start: 2022-11-09 | End: 2022-11-10 | Stop reason: HOSPADM

## 2022-11-09 RX ORDER — LIDOCAINE HYDROCHLORIDE 20 MG/ML
INJECTION INTRAVENOUS
Status: DISCONTINUED | OUTPATIENT
Start: 2022-11-09 | End: 2022-11-09

## 2022-11-09 RX ORDER — HYDROCODONE BITARTRATE AND ACETAMINOPHEN 5; 325 MG/1; MG/1
1 TABLET ORAL EVERY 4 HOURS PRN
Status: DISCONTINUED | OUTPATIENT
Start: 2022-11-10 | End: 2022-11-10 | Stop reason: HOSPADM

## 2022-11-09 RX ORDER — LIDOCAINE HYDROCHLORIDE 10 MG/ML
INJECTION INFILTRATION; PERINEURAL
Status: DISCONTINUED | OUTPATIENT
Start: 2022-11-09 | End: 2022-11-09 | Stop reason: HOSPADM

## 2022-11-09 RX ORDER — SODIUM CHLORIDE 0.9 % (FLUSH) 0.9 %
10 SYRINGE (ML) INJECTION
Status: DISCONTINUED | OUTPATIENT
Start: 2022-11-09 | End: 2022-11-10 | Stop reason: HOSPADM

## 2022-11-09 RX ORDER — HYDROCODONE BITARTRATE AND ACETAMINOPHEN 10; 325 MG/1; MG/1
1 TABLET ORAL EVERY 6 HOURS PRN
Status: DISCONTINUED | OUTPATIENT
Start: 2022-11-09 | End: 2022-11-09

## 2022-11-09 RX ORDER — PROPOFOL 10 MG/ML
VIAL (ML) INTRAVENOUS CONTINUOUS PRN
Status: DISCONTINUED | OUTPATIENT
Start: 2022-11-09 | End: 2022-11-09

## 2022-11-09 RX ORDER — CLORAZEPATE DIPOTASSIUM 3.75 MG/1
7.5 TABLET ORAL 2 TIMES DAILY
Status: DISCONTINUED | OUTPATIENT
Start: 2022-11-09 | End: 2022-11-10 | Stop reason: HOSPADM

## 2022-11-09 RX ORDER — BUPIVACAINE HYDROCHLORIDE 2.5 MG/ML
INJECTION, SOLUTION EPIDURAL; INFILTRATION; INTRACAUDAL
Status: DISCONTINUED | OUTPATIENT
Start: 2022-11-09 | End: 2022-11-09 | Stop reason: HOSPADM

## 2022-11-09 RX ORDER — ALBUTEROL SULFATE 90 UG/1
2 AEROSOL, METERED RESPIRATORY (INHALATION) EVERY 6 HOURS PRN
Status: DISCONTINUED | OUTPATIENT
Start: 2022-11-09 | End: 2022-11-10 | Stop reason: HOSPADM

## 2022-11-09 RX ORDER — MONTELUKAST SODIUM 10 MG/1
10 TABLET ORAL DAILY
Status: DISCONTINUED | OUTPATIENT
Start: 2022-11-10 | End: 2022-11-10 | Stop reason: HOSPADM

## 2022-11-09 RX ORDER — PANTOPRAZOLE SODIUM 40 MG/1
40 TABLET, DELAYED RELEASE ORAL DAILY
Status: DISCONTINUED | OUTPATIENT
Start: 2022-11-10 | End: 2022-11-10 | Stop reason: HOSPADM

## 2022-11-09 RX ORDER — HYDROMORPHONE HYDROCHLORIDE 1 MG/ML
1 INJECTION, SOLUTION INTRAMUSCULAR; INTRAVENOUS; SUBCUTANEOUS
Status: DISCONTINUED | OUTPATIENT
Start: 2022-11-10 | End: 2022-11-10

## 2022-11-09 RX ORDER — FLUTICASONE PROPIONATE 50 MCG
2 SPRAY, SUSPENSION (ML) NASAL DAILY
Status: DISCONTINUED | OUTPATIENT
Start: 2022-11-09 | End: 2022-11-10 | Stop reason: HOSPADM

## 2022-11-09 RX ORDER — BUSPIRONE HYDROCHLORIDE 5 MG/1
5 TABLET ORAL 2 TIMES DAILY
Status: DISCONTINUED | OUTPATIENT
Start: 2022-11-09 | End: 2022-11-10 | Stop reason: HOSPADM

## 2022-11-09 RX ORDER — FENTANYL CITRATE 50 UG/ML
25 INJECTION, SOLUTION INTRAMUSCULAR; INTRAVENOUS EVERY 5 MIN PRN
Status: CANCELLED | OUTPATIENT
Start: 2022-11-09

## 2022-11-09 RX ORDER — HYDROMORPHONE HYDROCHLORIDE 2 MG/ML
0.2 INJECTION, SOLUTION INTRAMUSCULAR; INTRAVENOUS; SUBCUTANEOUS EVERY 5 MIN PRN
Status: CANCELLED | OUTPATIENT
Start: 2022-11-09

## 2022-11-09 RX ORDER — CIPROFLOXACIN 500 MG/1
500 TABLET ORAL EVERY 12 HOURS
Status: DISCONTINUED | OUTPATIENT
Start: 2022-11-09 | End: 2022-11-10 | Stop reason: HOSPADM

## 2022-11-09 RX ORDER — AZELASTINE 1 MG/ML
2 SPRAY, METERED NASAL 2 TIMES DAILY
Status: DISCONTINUED | OUTPATIENT
Start: 2022-11-09 | End: 2022-11-10 | Stop reason: HOSPADM

## 2022-11-09 RX ORDER — FENTANYL CITRATE 50 UG/ML
INJECTION, SOLUTION INTRAMUSCULAR; INTRAVENOUS
Status: DISCONTINUED | OUTPATIENT
Start: 2022-11-09 | End: 2022-11-09

## 2022-11-09 RX ORDER — VANCOMYCIN HYDROCHLORIDE 1 G/20ML
INJECTION, POWDER, LYOPHILIZED, FOR SOLUTION INTRAVENOUS
Status: DISCONTINUED | OUTPATIENT
Start: 2022-11-09 | End: 2022-11-09 | Stop reason: HOSPADM

## 2022-11-09 RX ORDER — HYDROCODONE BITARTRATE AND ACETAMINOPHEN 5; 325 MG/1; MG/1
1 TABLET ORAL EVERY 6 HOURS PRN
Status: DISCONTINUED | OUTPATIENT
Start: 2022-11-09 | End: 2022-11-09

## 2022-11-09 RX ORDER — HYDROCODONE BITARTRATE AND ACETAMINOPHEN 10; 325 MG/1; MG/1
1 TABLET ORAL EVERY 4 HOURS PRN
Status: DISCONTINUED | OUTPATIENT
Start: 2022-11-10 | End: 2022-11-10 | Stop reason: HOSPADM

## 2022-11-09 RX ORDER — ONDANSETRON 2 MG/ML
4 INJECTION INTRAMUSCULAR; INTRAVENOUS EVERY 8 HOURS PRN
Status: DISCONTINUED | OUTPATIENT
Start: 2022-11-09 | End: 2022-11-10 | Stop reason: HOSPADM

## 2022-11-09 RX ORDER — HALOPERIDOL 5 MG/ML
0.5 INJECTION INTRAMUSCULAR EVERY 10 MIN PRN
Status: CANCELLED | OUTPATIENT
Start: 2022-11-09

## 2022-11-09 RX ADMIN — CLINDAMYCIN IN 5 PERCENT DEXTROSE 900 MG: 18 INJECTION, SOLUTION INTRAVENOUS at 05:11

## 2022-11-09 RX ADMIN — CLINDAMYCIN IN 5 PERCENT DEXTROSE 900 MG: 18 INJECTION, SOLUTION INTRAVENOUS at 09:11

## 2022-11-09 RX ADMIN — BUSPIRONE HYDROCHLORIDE 5 MG: 5 TABLET ORAL at 08:11

## 2022-11-09 RX ADMIN — MORPHINE SULFATE 2 MG: 2 INJECTION, SOLUTION INTRAMUSCULAR; INTRAVENOUS at 10:11

## 2022-11-09 RX ADMIN — CLINDAMYCIN IN 5 PERCENT DEXTROSE 900 MG: 18 INJECTION, SOLUTION INTRAVENOUS at 02:11

## 2022-11-09 RX ADMIN — CLINDAMYCIN IN 5 PERCENT DEXTROSE 900 MG: 18 INJECTION, SOLUTION INTRAVENOUS at 04:11

## 2022-11-09 RX ADMIN — HYDROCODONE BITARTRATE AND ACETAMINOPHEN 1 TABLET: 5; 325 TABLET ORAL at 08:11

## 2022-11-09 RX ADMIN — MORPHINE SULFATE 2 MG: 2 INJECTION, SOLUTION INTRAMUSCULAR; INTRAVENOUS at 03:11

## 2022-11-09 RX ADMIN — SODIUM CHLORIDE, SODIUM LACTATE, POTASSIUM CHLORIDE, AND CALCIUM CHLORIDE: .6; .31; .03; .02 INJECTION, SOLUTION INTRAVENOUS at 05:11

## 2022-11-09 RX ADMIN — AZELASTINE HYDROCHLORIDE 274 MCG: 137 SPRAY, METERED NASAL at 09:11

## 2022-11-09 RX ADMIN — PROPOFOL 50 MCG/KG/MIN: 10 INJECTION, EMULSION INTRAVENOUS at 05:11

## 2022-11-09 RX ADMIN — FENTANYL CITRATE 50 MCG: 50 INJECTION, SOLUTION INTRAMUSCULAR; INTRAVENOUS at 05:11

## 2022-11-09 RX ADMIN — LIDOCAINE HYDROCHLORIDE 100 MG: 20 INJECTION, SOLUTION INTRAVENOUS at 05:11

## 2022-11-09 RX ADMIN — MORPHINE SULFATE 2 MG: 2 INJECTION, SOLUTION INTRAMUSCULAR; INTRAVENOUS at 06:11

## 2022-11-09 RX ADMIN — CIPROFLOXACIN 500 MG: 500 TABLET, FILM COATED ORAL at 08:11

## 2022-11-09 RX ADMIN — CLORAZEPATE DIPOTASSIUM 7.5 MG: 3.75 TABLET ORAL at 09:11

## 2022-11-09 NOTE — ED NOTES
Report given to Melany from surgery, pt updated that she will be moved to surgery soon. Pt notified her daughter. Call bell within reach.

## 2022-11-09 NOTE — H&P
[facility] - Emergency Dept  Podiatry  Consult Note     Patient Name: Autumn Harris  MRN: [id number]  Admission Date: 11/8/2022  Hospital Length of Stay: 0 days  Attending Physician: No att. providers found  Primary Care Provider: Tonya Vega MD      Consults  Subjective:      History of Present Illness:  76 y/o female seen in ED for open fracture to right second toe that occurred 2 days ago. States she caught her toe on a rug which led to the injury. Patient relates moderate persistent pain to the toe.         Scheduled Meds:   clindamycin (CLEOCIN) IVPB  900 mg Intravenous Q8H      Continuous Infusions:   lactated ringers 125 mL/hr at 11/08/22 2226      PRN Meds:morphine          Review of patient's allergies indicates:   Allergen Reactions    Pcn [penicillins]      Sulfa (sulfonamide antibiotics)                Past Medical History:   Diagnosis Date    Anxiety      CKD (chronic kidney disease) stage 3, GFR 30-59 ml/min      COPD (chronic obstructive pulmonary disease)      Depression      Encounter for blood transfusion      Fibromyalgia      Hematuria      High cholesterol      Hypertension      Oral cancer      Proteinuria      Sore throat 7/3/2019    Urinary tract infection      Vitamin D deficiency              Past Surgical History:   Procedure Laterality Date    ABDOMINAL SURGERY         [facility]    HYSTERECTOMY        INSERTION OF IMPLANTABLE LOOP RECORDER N/A 10/15/2021     Procedure: Insertion, Implantable Loop Recorder;  Surgeon: Cristhian Alvares MD;  Location: [facility] CATH LAB/EP;  Service: Cardiology;  Laterality: N/A;    OOPHORECTOMY         only one removed    Stomach Ulcer Surgery             Family History         Problem Relation (Age of Onset)     Heart disease Mother, Brother, Maternal Aunt     Lung cancer Brother     Pacemaker/defibrilator Brother                   Tobacco Use    Smoking status: Former       Packs/day: 1.50       Years: 45.00       Pack years: 67.50       Types: Cigarettes        Quit date: 1995       Years since quittin.2    Smokeless tobacco: Never    Tobacco comments:       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   HENT:  Negative for congestion and hearing loss.    Respiratory:  Negative for cough.    Gastrointestinal:  Negative for nausea and vomiting.   Skin:  Positive for color change and wound.   Neurological:  Negative for dizziness and numbness.   Psychiatric/Behavioral:  Negative for agitation.    Objective:      Vital Signs (Most Recent):  Temp: 98 °F (36.7 °C) (22 0618)  Pulse: 82 (22 0732)  Resp: (!) 21 (22 1057)  BP: 121/69 (22 0732)  SpO2: 95 % (22 0702)    Vital Signs (24h Range):  Temp:  [97.8 °F (36.6 °C)-98.3 °F (36.8 °C)] 98 °F (36.7 °C)  Pulse:  [] 82  Resp:  [16-21] 21  SpO2:  [95 %-100 %] 95 %  BP: (120-171)/(67-89) 121/69      Weight: 65.8 kg (145 lb)  Body mass index is 25.69 kg/m².     Foot Exam     General  General Appearance: appears stated age and healthy   Orientation: alert and oriented to person, place, and time   Affect: appropriate         Right Foot/Ankle      Neurovascular  Dorsalis pedis: 2+  Posterior tibial: 2+              Laboratory:  CBC:       Recent Labs   Lab 22  1418   WBC 7.40   RBC 4.71   HGB 11.8*   HCT 38.4      MCV 82   MCH 25.1*   MCHC 30.7*      CMP:       Recent Labs   Lab 22  1418   *   CALCIUM 10.1   ALBUMIN 4.0   PROT 8.2   *   K 4.8   CO2 14*      BUN 18   CREATININE 1.4   ALKPHOS 95   ALT 11   AST 29   BILITOT 0.3      CRP:       Recent Labs   Lab 22  1418   CRP 12.9*      ESR:       Recent Labs   Lab 22  1416   SEDRATE 49*         Diagnostic Results:  I have reviewed all pertinent imaging results/findings within the past 24 hours.           X-Ray Foot  Complete Right  Order: [id number]  Status: Final result     Visible to patient: Yes (not seen)     Next appt: 11/22/2022 at 02:30 PM in Cardiology (Arjun Horn MD)     Dx: Foot pain     0 Result Notes  Details     Reading Physician Reading Date Result Priority   Valerio Astorga MD  [phone]  [phone] 11/8/2022 STAT      Narrative & Impression  EXAMINATION:  XR FOOT COMPLETE 3 VIEW RIGHT     CLINICAL HISTORY:  . Pain in unspecified foot     TECHNIQUE:  AP, lateral, and oblique views of the right foot were performed.     COMPARISON:  None     FINDINGS:  Three views right foot.     There is osteopenia.  There is fracture of the middle phalanx of the 2nd toe.  Fracture plane approaches and may involve the D IP articular surface.  No dislocation.  There is edema about the site.  There are degenerative changes of the foot.     Impression:     1. Second toe fracture as above.        Electronically signed by: Valerio Astorga MD  Date:                                            11/08/2022  Time:                                           14:48         Clinical Findings:  There was a wound dorsal right second toe PIPJ with overlying dry crusting and exposed bone centrally with surrounding erythema and ecchymosis. Moderate pain with attempted palpation and examination. No active bleeding. No odor.              Assessment/Plan:      Open fracture dislocation of interphalangeal joint of single toe, right, initial encounter  Discussed I&D with washout vs amputation right second toe. Patient elected to proceed with irrigation and debridement of the exposed bone. Will obtain bone biopsy and recommend broad spectrum antibiotic coverage per admitting team. Plan for OR today. Associated risks, benefits and postop course discussed. No guarantees given or implied. This procedure has been fully reviewed with the patient and written informed consent has been obtained.              Thank you for your consult. I will  follow-up with patient. Please contact us if you have any additional questions.     Alfa Quintero DPM  Podiatry  [facility] - Emergency Dept          Revision History

## 2022-11-09 NOTE — ANESTHESIA PREPROCEDURE EVALUATION
11/09/2022  Autumn Harris is a 77 y.o., female.  [facility]  Anesthesia Pre-Operative Evaluation       Patient Name: Autumn Harris  YOB: 1945  MRN: [id number]  [facility]: [id number]      Code Status: Prior   Date of Procedure: 11/9/2022  Anesthesia: Local MAC Procedure: Procedure(s) (LRB):  IRRIGATION AND DEBRIDEMENT, LOWER EXTREMITY (Right)  Pre-Operative Diagnosis: Open fracture dislocation of interphalangeal joint of single toe, right, initial encounter [S92.911B]  Proceduralist: Surgeon(s) and Role:     * Alfa Quintero DPM - Primary        SUBJECTIVE:   Autumn Harris is a 77 y.o. female who  has a past medical history of Anxiety, CKD (chronic kidney disease) stage 3, GFR 30-59 ml/min, COPD (chronic obstructive pulmonary disease), Depression, Encounter for blood transfusion, Fibromyalgia, Hematuria, High cholesterol, Hypertension, Oral cancer, Proteinuria, Sore throat (7/3/2019), Urinary tract infection, and Vitamin D deficiency. 76 y/o female seen in ED for open fracture to right second toe that occurred 2 days ago. States she caught her toe on a rug which led to the injury. Patient relates moderate persistent pain to the toe.     she has a current medication list which includes the following long-term medication(s): albuterol, azelastine, buspirone, clorazepate, levocetirizine, pantoprazole, triamcinolone acetonide 0.1%, acarbose, blood-glucose meter, diltiazem, and spironolactone.   ALLERGIES:     Review of patient's allergies indicates:   Allergen Reactions    Pcn [penicillins]     Sulfa (sulfonamide antibiotics)      LDA:      Lines/Drains/Airways     Peripheral Intravenous Line  Duration                Peripheral IV - Single Lumen 11/09/22 0250 22 G Right Hand <1 day              MEDICATIONS:     Antibiotics (From admission, onward)    Start     Stop Route Frequency  Ordered    11/09/22 0030  clindamycin in D5W 900 mg/50 mL IVPB 900 mg         -- IV Every 8 hours (non-standard times) 11/08/22 2140        VTE Risk Mitigation (From admission, onward)    None          History:     Active Hospital Problems    Diagnosis  POA    Open fracture dislocation of interphalangeal joint of single toe, right, initial encounter [S92.911B]  Unknown      Resolved Hospital Problems   No resolved problems to display.     Patient Active Problem List   Diagnosis    Essential hypertension    Chronic bronchitis    Anxiety    Nonspecific abnormal electrocardiogram (ECG) (EKG)    Premature atrial contractions    Dyspnea on exertion    Aortic valve insufficiency    Chest pain of uncertain etiology    Gastroesophageal reflux disease without esophagitis    Syncope    Hyponatremia    Hypokalemia    Hypomagnesemia    Acute abdominal pain    Generalized abdominal pain    Chronic gastritis without bleeding    History of squamous cell carcinoma    Sore throat    Other dysphagia    Transient confusion    Hypotension due to drugs    Open fracture dislocation of interphalangeal joint of single toe, right, initial encounter     Past Medical History:   Diagnosis Date    Anxiety     CKD (chronic kidney disease) stage 3, GFR 30-59 ml/min     COPD (chronic obstructive pulmonary disease)     Depression     Encounter for blood transfusion     Fibromyalgia     Hematuria     High cholesterol     Hypertension     Oral cancer     Proteinuria     Sore throat 7/3/2019    Urinary tract infection     Vitamin D deficiency        Surgical History:    has a past surgical history that includes Hysterectomy; Stomach Ulcer Surgery; Abdominal surgery; Oophorectomy; and Insertion of implantable loop recorder (N/A, 10/15/2021).   Social History:     reports that she quit smoking about 27 years ago. She has a 67.50 pack-year smoking history. She has never used smokeless tobacco. She reports that she  does not drink alcohol and does not use drugs.     OBJECTIVE:     Vital Signs (Most Recent):  Temp: 36.7 °C (98 °F) (11/09/22 0618)  Pulse: 88 (11/09/22 1240)  Resp: 18 (11/09/22 1511)  BP: (!) 160/93 (11/09/22 1240)  SpO2: 95 % (11/09/22 1240) Vital Signs Range (Last 24H):  Temp:  [36.6 °C (97.8 °F)-36.7 °C (98 °F)]   Pulse:  []   Resp:  [16-21]   BP: (120-171)/(67-93)   SpO2:  [93 %-100 %]        Body mass index is 25.69 kg/m².   Wt Readings from Last 4 Encounters:   11/08/22 65.8 kg (145 lb)   03/28/22 65.8 kg (145 lb)   02/04/22 63.5 kg (140 lb)   11/25/21 51.3 kg (113 lb)     Significant Labs:  Lab Results   Component Value Date    WBC 7.40 11/08/2022    HGB 11.8 (L) 11/08/2022    HCT 38.4 11/08/2022     11/08/2022     (L) 11/08/2022    K 4.8 11/08/2022     11/08/2022    CREATININE 1.4 11/08/2022    BUN 18 11/08/2022    CO2 14 (L) 11/08/2022     (H) 11/08/2022    CALCIUM 10.1 11/08/2022    MG 1.7 11/25/2021    PHOS 2.3 (L) 11/25/2021    ALKPHOS 95 11/08/2022    ALT 11 11/08/2022    AST 29 11/08/2022    ALBUMIN 4.0 11/08/2022    INR 1.0 10/13/2017    APTT 35.5 10/13/2017    HGBA1C 5.5 11/25/2021    CPK 59 03/08/2021    CPKMB 0.6 11/22/2010    TROPONINI <0.030 11/25/2021    MB 1.6 11/22/2010     (H) 11/25/2021     No LMP recorded (lmp unknown). Patient has had a hysterectomy.    EKG:   Results for orders placed or performed during the hospital encounter of 11/25/21   EKG 12-lead    Collection Time: 11/25/21  9:03 PM    Narrative    Test Reason : R41.82,    Vent. Rate : 073 BPM     Atrial Rate : 073 BPM     P-R Int : 182 ms          QRS Dur : 086 ms      QT Int : 456 ms       P-R-T Axes : 055 028 075 degrees     QTc Int : 502 ms    Normal sinus rhythm  Anteroseptal infarct (cited on or before 27-MAR-2018)  Abnormal ECG  When compared with ECG of 16-NOV-2021 14:08,  QT has lengthened  Confirmed by Sofia GARG, See FONSECA ([id number]) on 12/2/2021 10:57:42 AM    Referred By: JB    SELF           Confirmed By:See Black MD       TTE:  No results found for this or any previous visit.  No results found for: EF   Results for orders placed or performed during the hospital encounter of 07/12/16   Echo doppler color flow   Result Value Ref Range    EF + QEF 65 55 - 65    Diastolic Dysfunction Yes (A)     Aortic Valve Regurgitation MILD     Mitral Valve Mobility NORMAL     Tricuspid Valve Regurgitation TRIVIAL      ASSESSMENT/PLAN:         Pre-op Assessment    I have reviewed the Patient Summary Reports.     I have reviewed the Nursing Notes. I have reviewed the NPO Status.   I have reviewed the Medications.     Review of Systems  Anesthesia Hx:  History of prior surgery of interest to airway management or planning:  Denies Personal Hx of Anesthesia complications.          Anesthesia Plan  Type of Anesthesia, risks & benefits discussed:    Anesthesia Type: Gen Natural Airway  Intra-op Monitoring Plan: Standard ASA Monitors  Post Op Pain Control Plan: multimodal analgesia and IV/PO Opioids PRN  Induction:  IV  Informed Consent: Informed consent signed with the Patient and all parties understand the risks and agree with anesthesia plan.  All questions answered.   ASA Score: 3  Day of Surgery Review of History & Physical: H&P Update referred to the surgeon/provider.    Ready For Surgery From Anesthesia Perspective.     .

## 2022-11-09 NOTE — SUBJECTIVE & OBJECTIVE
Scheduled Meds:   clindamycin (CLEOCIN) IVPB  900 mg Intravenous Q8H     Continuous Infusions:   lactated ringers 125 mL/hr at 22 2226     PRN Meds:morphine    Review of patient's allergies indicates:   Allergen Reactions    Pcn [penicillins]     Sulfa (sulfonamide antibiotics)         Past Medical History:   Diagnosis Date    Anxiety     CKD (chronic kidney disease) stage 3, GFR 30-59 ml/min     COPD (chronic obstructive pulmonary disease)     Depression     Encounter for blood transfusion     Fibromyalgia     Hematuria     High cholesterol     Hypertension     Oral cancer     Proteinuria     Sore throat 7/3/2019    Urinary tract infection     Vitamin D deficiency      Past Surgical History:   Procedure Laterality Date    ABDOMINAL SURGERY      [facility]    HYSTERECTOMY      INSERTION OF IMPLANTABLE LOOP RECORDER N/A 10/15/2021    Procedure: Insertion, Implantable Loop Recorder;  Surgeon: Cristhian Alvares MD;  Location: [facility] CATH LAB/EP;  Service: Cardiology;  Laterality: N/A;    OOPHORECTOMY      only one removed    Stomach Ulcer Surgery         Family History       Problem Relation (Age of Onset)    Heart disease Mother, Brother, Maternal Aunt    Lung cancer Brother    Pacemaker/defibrilator Brother          Tobacco Use    Smoking status: Former     Packs/day: 1.50     Years: 45.00     Pack years: 67.50     Types: Cigarettes     Quit date: 1995     Years since quittin.2    Smokeless tobacco: Never    Tobacco comments:     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   Substance and Sexual Activity    Alcohol use: No    Drug use: Never    Sexual activity: Not on file     Review of Systems   Constitutional:  Negative for chills and fever.   HENT:  Negative for congestion and hearing loss.    Respiratory:  Negative for cough.    Gastrointestinal:  Negative for nausea and vomiting.   Skin:  Positive for color change and wound.   Neurological:  Negative for dizziness and numbness.    Psychiatric/Behavioral:  Negative for agitation.    Objective:     Vital Signs (Most Recent):  Temp: 98 °F (36.7 °C) (11/09/22 0618)  Pulse: 82 (11/09/22 0732)  Resp: (!) 21 (11/09/22 1057)  BP: 121/69 (11/09/22 0732)  SpO2: 95 % (11/09/22 0702)   Vital Signs (24h Range):  Temp:  [97.8 °F (36.6 °C)-98.3 °F (36.8 °C)] 98 °F (36.7 °C)  Pulse:  [] 82  Resp:  [16-21] 21  SpO2:  [95 %-100 %] 95 %  BP: (120-171)/(67-89) 121/69     Weight: 65.8 kg (145 lb)  Body mass index is 25.69 kg/m².    Foot Exam    General  General Appearance: appears stated age and healthy   Orientation: alert and oriented to person, place, and time   Affect: appropriate       Right Foot/Ankle     Neurovascular  Dorsalis pedis: 2+  Posterior tibial: 2+          Laboratory:  CBC:   Recent Labs   Lab 11/08/22  1418   WBC 7.40   RBC 4.71   HGB 11.8*   HCT 38.4      MCV 82   MCH 25.1*   MCHC 30.7*     CMP:   Recent Labs   Lab 11/08/22  1418   *   CALCIUM 10.1   ALBUMIN 4.0   PROT 8.2   *   K 4.8   CO2 14*      BUN 18   CREATININE 1.4   ALKPHOS 95   ALT 11   AST 29   BILITOT 0.3     CRP:   Recent Labs   Lab 11/08/22  1418   CRP 12.9*     ESR:   Recent Labs   Lab 11/08/22  1416   SEDRATE 49*       Diagnostic Results:  I have reviewed all pertinent imaging results/findings within the past 24 hours.         X-Ray Foot Complete Right  Order: [id number]  Status: Final result     Visible to patient: Yes (not seen)     Next appt: 11/22/2022 at 02:30 PM in Cardiology (Arjun Horn MD)     Dx: Foot pain     0 Result Notes  Details    Reading Physician Reading Date Result Priority   Valerio Astorga MD  [phone] 11/8/2022 STAT     Narrative & Impression  EXAMINATION:  XR FOOT COMPLETE 3 VIEW RIGHT     CLINICAL HISTORY:  . Pain in unspecified foot     TECHNIQUE:  AP, lateral, and oblique views of the right foot were performed.     COMPARISON:  None     FINDINGS:  Three views right foot.     There is  osteopenia.  There is fracture of the middle phalanx of the 2nd toe.  Fracture plane approaches and may involve the D IP articular surface.  No dislocation.  There is edema about the site.  There are degenerative changes of the foot.     Impression:     1. Second toe fracture as above.        Electronically signed by: Valerio Astorga MD  Date:                                            11/08/2022  Time:                                           14:48       Clinical Findings:  There was a wound dorsal right second toe PIPJ with overlying dry crusting and exposed bone centrally with surrounding erythema and ecchymosis. Moderate pain with attempted palpation and examination. No active bleeding. No odor.

## 2022-11-09 NOTE — ASSESSMENT & PLAN NOTE
Discussed I&D with washout vs amputation right second toe. Patient elected to proceed with irrigation and debridement of the exposed bone. Will obtain bone biopsy and recommend broad spectrum antibiotic coverage per admitting team. Plan for OR today. Associated risks, benefits and postop course discussed. No guarantees given or implied. This procedure has been fully reviewed with the patient and written informed consent has been obtained.

## 2022-11-09 NOTE — ED NOTES
Report rec'd, pt care assumed. Pt resting quietly in bed. Plan of care reviewed, call bell within reach. Pt aware of NPO status.

## 2022-11-09 NOTE — HPI
76 y/o female seen in ED for open fracture to right second toe that occurred 2 days ago. States she caught her toe on a rug which led to the injury. Patient relates moderate persistent pain to the toe.

## 2022-11-09 NOTE — CONSULTS
[facility] - Emergency Dept  Podiatry  Consult Note    Patient Name: Autumn Harris  MRN: [id number]  Admission Date: 2022  Hospital Length of Stay: 0 days  Attending Physician: No att. providers found  Primary Care Provider: Tonya Vega MD     Consults  Subjective:     History of Present Illness:  78 y/o female seen in ED for open fracture to right second toe that occurred 2 days ago. States she caught her toe on a rug which led to the injury. Patient relates moderate persistent pain to the toe.       Scheduled Meds:   clindamycin (CLEOCIN) IVPB  900 mg Intravenous Q8H     Continuous Infusions:   lactated ringers 125 mL/hr at 22 2226     PRN Meds:morphine    Review of patient's allergies indicates:   Allergen Reactions    Pcn [penicillins]     Sulfa (sulfonamide antibiotics)         Past Medical History:   Diagnosis Date    Anxiety     CKD (chronic kidney disease) stage 3, GFR 30-59 ml/min     COPD (chronic obstructive pulmonary disease)     Depression     Encounter for blood transfusion     Fibromyalgia     Hematuria     High cholesterol     Hypertension     Oral cancer     Proteinuria     Sore throat 7/3/2019    Urinary tract infection     Vitamin D deficiency      Past Surgical History:   Procedure Laterality Date    ABDOMINAL SURGERY      [facility]    HYSTERECTOMY      INSERTION OF IMPLANTABLE LOOP RECORDER N/A 10/15/2021    Procedure: Insertion, Implantable Loop Recorder;  Surgeon: Cristhian Alvares MD;  Location: [facility] CATH LAB/EP;  Service: Cardiology;  Laterality: N/A;    OOPHORECTOMY      only one removed    Stomach Ulcer Surgery         Family History       Problem Relation (Age of Onset)    Heart disease Mother, Brother, Maternal Aunt    Lung cancer Brother    Pacemaker/defibrilator Brother          Tobacco Use    Smoking status: Former     Packs/day: 1.50     Years: 45.00     Pack years: 67.50     Types: Cigarettes     Quit date: 1995     Years since quittin.2    Smokeless tobacco: Never     Tobacco comments:     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   Substance and Sexual Activity    Alcohol use: No    Drug use: Never    Sexual activity: Not on file     Review of Systems   Constitutional:  Negative for chills and fever.   HENT:  Negative for congestion and hearing loss.    Respiratory:  Negative for cough.    Gastrointestinal:  Negative for nausea and vomiting.   Skin:  Positive for color change and wound.   Neurological:  Negative for dizziness and numbness.   Psychiatric/Behavioral:  Negative for agitation.    Objective:     Vital Signs (Most Recent):  Temp: 98 °F (36.7 °C) (11/09/22 0618)  Pulse: 82 (11/09/22 0732)  Resp: (!) 21 (11/09/22 1057)  BP: 121/69 (11/09/22 0732)  SpO2: 95 % (11/09/22 0702)   Vital Signs (24h Range):  Temp:  [97.8 °F (36.6 °C)-98.3 °F (36.8 °C)] 98 °F (36.7 °C)  Pulse:  [] 82  Resp:  [16-21] 21  SpO2:  [95 %-100 %] 95 %  BP: (120-171)/(67-89) 121/69     Weight: 65.8 kg (145 lb)  Body mass index is 25.69 kg/m².    Foot Exam    General  General Appearance: appears stated age and healthy   Orientation: alert and oriented to person, place, and time   Affect: appropriate       Right Foot/Ankle     Neurovascular  Dorsalis pedis: 2+  Posterior tibial: 2+          Laboratory:  CBC:   Recent Labs   Lab 11/08/22  1418   WBC 7.40   RBC 4.71   HGB 11.8*   HCT 38.4      MCV 82   MCH 25.1*   MCHC 30.7*     CMP:   Recent Labs   Lab 11/08/22  1418   *   CALCIUM 10.1   ALBUMIN 4.0   PROT 8.2   *   K 4.8   CO2 14*      BUN 18   CREATININE 1.4   ALKPHOS 95   ALT 11   AST 29   BILITOT 0.3     CRP:   Recent Labs   Lab 11/08/22  1418   CRP 12.9*     ESR:   Recent Labs   Lab 11/08/22  1416   SEDRATE 49*       Diagnostic Results:  I have reviewed all pertinent imaging results/findings within the past 24 hours.         X-Ray Foot Complete Right  Order: [id number]  Status: Final result     Visible to patient: Yes (not seen)     Next appt: 11/22/2022 at  02:30 PM in Cardiology (Arjun Horn MD)     Dx: Foot pain     0 Result Notes  Details    Reading Physician Reading Date Result Priority   Valerio Astorga MD  [phone] 11/8/2022 STAT     Narrative & Impression  EXAMINATION:  XR FOOT COMPLETE 3 VIEW RIGHT     CLINICAL HISTORY:  . Pain in unspecified foot     TECHNIQUE:  AP, lateral, and oblique views of the right foot were performed.     COMPARISON:  None     FINDINGS:  Three views right foot.     There is osteopenia.  There is fracture of the middle phalanx of the 2nd toe.  Fracture plane approaches and may involve the D IP articular surface.  No dislocation.  There is edema about the site.  There are degenerative changes of the foot.     Impression:     1. Second toe fracture as above.        Electronically signed by: Valerio Astorga MD  Date:                                            11/08/2022  Time:                                           14:48       Clinical Findings:  There was a wound dorsal right second toe PIPJ with overlying dry crusting and exposed bone centrally with surrounding erythema and ecchymosis. Moderate pain with attempted palpation and examination. No active bleeding. No odor.           Assessment/Plan:     Open fracture dislocation of interphalangeal joint of single toe, right, initial encounter  Discussed I&D with washout vs amputation right second toe. Patient elected to proceed with irrigation and debridement of the exposed bone. Will obtain bone biopsy and recommend broad spectrum antibiotic coverage per admitting team. Plan for OR today. Associated risks, benefits and postop course discussed. No guarantees given or implied. This procedure has been fully reviewed with the patient and written informed consent has been obtained.          Thank you for your consult. I will follow-up with patient. Please contact us if you have any additional questions.    Alfa Quintero DPM  Podiatry  [place] - Emergency Dept

## 2022-11-09 NOTE — PROGRESS NOTES
Pharmacokinetic Initial Assessment: IV Vancomycin    Assessment/Plan:    Initiate intravenous vancomycin with loading dose of 1250 mg once with subsequent doses when random concentrations are less than 20 mcg/mL  Desired empiric serum trough concentration is 15 to 20 mcg/mL  Draw vancomycin random level on 11/10/22 at 1830.  Pharmacy will continue to follow and monitor vancomycin.      Please contact pharmacy at extension [phone] with any questions regarding this assessment.     Thank you for the consult,   Екатерина Little       Patient brief summary:  Autumn Harris is a 77 y.o. female initiated on antimicrobial therapy with IV Vancomycin for treatment of suspected bone/joint infection    Drug Allergies:   Review of patient's allergies indicates:   Allergen Reactions    Pcn [penicillins]     Sulfa (sulfonamide antibiotics)        Actual Body Weight:   65.8 kg    Renal Function:   Estimated Creatinine Clearance: 30.7 mL/min (based on SCr of 1.4 mg/dL).,     Dialysis Method (if applicable):  N/A    CBC (last 72 hours):  Recent Labs   Lab Result Units 11/08/22  1418   WBC K/uL 7.40   Hemoglobin g/dL 11.8*   Hematocrit % 38.4   Platelets K/uL 287   Gran % % 64.2   Lymph % % 21.4   Mono % % 9.1   Eosinophil % % 3.6   Basophil % % 1.6   Differential Method  Automated       Metabolic Panel (last 72 hours):  Recent Labs   Lab Result Units 11/08/22  1418   Sodium mmol/L 135*   Potassium mmol/L 4.8   Chloride mmol/L 110   CO2 mmol/L 14*   Glucose mg/dL 126*   BUN mg/dL 18   Creatinine mg/dL 1.4   Albumin g/dL 4.0   Total Bilirubin mg/dL 0.3   Alkaline Phosphatase U/L 95   AST U/L 29   ALT U/L 11       Drug levels (last 3 results):  No results for input(s): VANCOMYCINRA, VANCORANDOM, VANCOMYCINPE, VANCOPEAK, VANCOMYCINTR, VANCOTROUGH in the last 72 hours.    Microbiologic Results:  Microbiology Results (last 7 days)       ** No results found for the last 168 hours. **

## 2022-11-09 NOTE — ED PROVIDER NOTES
Encounter Date: 2022       History     Chief Complaint   Patient presents with    Toe Injury     Right 2nd toe pain swelling and wound present after hitting toe on thick rug on sun, + pain and swelling noted      77-year-old woman who hit foot on rug 2 days prior with pain and swelling as well as bleeding thereafter. She went to an urgent care today because the pain and swelling has continued to worsen during this time. She denies any fevers or chills or injuries elsewhere. Walking makes the pain worse, resting helps slightly with the pain.       Review of patient's allergies indicates:   Allergen Reactions    Pcn [penicillins]     Sulfa (sulfonamide antibiotics)      Past Medical History:   Diagnosis Date    Anxiety     CKD (chronic kidney disease) stage 3, GFR 30-59 ml/min     COPD (chronic obstructive pulmonary disease)     Depression     Encounter for blood transfusion     Fibromyalgia     Hematuria     High cholesterol     Hypertension     Oral cancer     Proteinuria     Sore throat 7/3/2019    Urinary tract infection     Vitamin D deficiency      Past Surgical History:   Procedure Laterality Date    ABDOMINAL SURGERY      [place]    HYSTERECTOMY      INSERTION OF IMPLANTABLE LOOP RECORDER N/A 10/15/2021    Procedure: Insertion, Implantable Loop Recorder;  Surgeon: Cristhian Alvares MD;  Location: [facility] CATH LAB/EP;  Service: Cardiology;  Laterality: N/A;    OOPHORECTOMY      only one removed    Stomach Ulcer Surgery       Family History   Adopted: Yes   Problem Relation Age of Onset    Heart disease Mother     Pacemaker/defibrilator Brother     Heart disease Brother     Heart disease Maternal Aunt     Lung cancer Brother      Social History     Tobacco Use    Smoking status: Former     Packs/day: 1.50     Years: 45.00     Pack years: 67.50     Types: Cigarettes     Quit date: 1995     Years since quittin.2    Smokeless tobacco: Never    Tobacco comments:     Quit when diagnosed with squamous cell  carcinoma   Substance Use Topics    Alcohol use: No    Drug use: Never     Review of Systems  Constitutional-no fever  HEENT-no congestion  Eyes-no redness  Respiratory-no shortness of breath  Cardio-no chest pain  GI-no abdominal pain  Endocrine-no cold intolerance  -no difficulty urinating  MSK-+ foot pain and swelling  Skin-no rashes  Allergy-no environmental allergy  Neurologic-, no headache  Hematology-no swollen nodes  Behavioral-no confusion   Physical Exam     Initial Vitals [11/08/22 1323]   BP Pulse Resp Temp SpO2   139/85 97 18 98.3 °F (36.8 °C) 97 %      MAP       --         Physical Exam    Musculoskeletal:        Feet:        Constitutional: diminutive chronically ill appearing woman in mild distress  Eyes: Conjunctivae normal.  ENT       Head: Normocephalic, atraumatic.       Nose: Normal external appearance        Mouth/Throat: no strigulous respirations   Hematological/Lymphatic/Immunilogical: no visible lymphadenopathy   Cardiovascular: Normal rate,   Respiratory: Normal respiratory effort.   Gastrointestinal: non distended   Musculoskeletal: stable pelvis  Normal ROM in the knees and ankle bilaterally    Neurologic: Alert, oriented. Normal speech and language. No gross focal neurologic deficits are appreciated.  Skin: Skin is warm, dry. No rash noted.  Psychiatric: Mood and affect are normal.    ED Course   Procedures  Labs Reviewed   COMPREHENSIVE METABOLIC PANEL - Abnormal; Notable for the following components:       Result Value    Sodium 135 (*)     CO2 14 (*)     Glucose 126 (*)     eGFR 39 (*)     All other components within normal limits   CBC W/ AUTO DIFFERENTIAL - Abnormal; Notable for the following components:    Hemoglobin 11.8 (*)     MCH 25.1 (*)     MCHC 30.7 (*)     RDW 15.9 (*)     All other components within normal limits   C-REACTIVE PROTEIN - Abnormal; Notable for the following components:    CRP 12.9 (*)     All other components within normal limits   SEDIMENTATION RATE -  Abnormal; Notable for the following components:    Sed Rate 49 (*)     All other components within normal limits   SEDIMENTATION RATE   C-REACTIVE PROTEIN          Imaging Results              X-Ray Foot Complete Right (Final result)  Result time 11/08/22 14:48:12      Final result by Valerio Astorga MD (11/08/22 14:48:12)                   Impression:      1. Second toe fracture as above.      Electronically signed by: Valerio Astorga MD  Date:    11/08/2022  Time:    14:48               Narrative:    EXAMINATION:  XR FOOT COMPLETE 3 VIEW RIGHT    CLINICAL HISTORY:  . Pain in unspecified foot    TECHNIQUE:  AP, lateral, and oblique views of the right foot were performed.    COMPARISON:  None    FINDINGS:  Three views right foot.    There is osteopenia.  There is fracture of the middle phalanx of the 2nd toe.  Fracture plane approaches and may involve the D IP articular surface.  No dislocation.  There is edema about the site.  There are degenerative changes of the foot.                                       Medications   clindamycin in D5W 900 mg/50 mL IVPB 900 mg (has no administration in time range)   lactated ringers infusion ( Intravenous New Bag 11/8/22 2226)   morphine injection 2 mg (2 mg Intravenous Given 11/8/22 2222)   clindamycin in D5W 900 mg/50 mL IVPB 900 mg (0 mg Intravenous Stopped 11/8/22 1637)   LIDOcaine (PF) 10 mg/ml (1%) injection 50 mg (50 mg Infiltration Given 11/8/22 1538)     Medical Decision Making:   History:   Old Medical Records: I decided to obtain old medical records.  Old Records Summarized: records from clinic visits and records from previous admission(s).  Differential Diagnosis:   Toe fracture, toe dislocation, open fracture  Clinical Tests:   Lab Tests: Ordered and Reviewed  Radiological Study: Ordered and Reviewed  ED Management:  Obvious bony protrusion through the skin. Marked swelling in the 2nd digit of foot.  Orthopedics evaluated at bedside . >48 hours since  injury.  Significant concern may require wshout or amputation.  Will plan for transfer for higher care level.  IV abx administered.  Analgesics Administered.  [facility] discussed multiple options, ultimate disposition for elizabeth boogie fredo q8.                         Clinical Impression:   Final diagnoses:  [M79.673] Foot pain  [S92.521B] Open displaced fracture of middle phalanx of lesser toe of right foot, initial encounter (Primary)        ED Disposition Condition    Transfer to Another Facility Stable                Chaka Chambers MD  11/08/22 3222

## 2022-11-09 NOTE — PHARMACY MED REC
"  [facility]           Pharmacy  Admission Medication History     The home medication history was taken by Asuncion Miranda.      Medication history obtained from Medications listed below were obtained from: Patient/family    Based on information gathered for medication list, you may go to "Admission" then "Reconcile Home Medications" tabs to review and/or act upon those items.     The home medication list has been updated by the Pharmacy department.   Please read ALL comments highlighted in yellow.   Please address this information as you see fit.    Feel free to contact us if you have any questions or require assistance.    The medications listed below were removed from the home medication list.  Please reorder if appropriate:    Patient reports NOT TAKING the following medication(s):  Proctosol hc rectal cream  Tessalon 100mg  Claritin 10mg  Medrol dpk 4mg  Supred kit  Meclizine 25mg      No current facility-administered medications on file prior to encounter.     Current Outpatient Medications on File Prior to Encounter   Medication Sig Dispense Refill    albuterol (VENTOLIN HFA) 90 mcg/actuation inhaler Inhale 2 puffs into the lungs every 6 (six) hours as needed for Wheezing. Rescue 18 g 0    azelastine (ASTELIN) 137 mcg (0.1 %) nasal spray 2 sprays by Nasal route 2 (two) times daily.  5    busPIRone (BUSPAR) 5 MG Tab Take 5 mg by mouth 2 (two) times daily.      clorazepate (TRANXENE) 3.75 MG Tab Take 7.5 mg by mouth 2 (two) times daily. 2 tablets      ergocalciferol (ERGOCALCIFEROL) 50,000 unit Cap Take 50,000 Units by mouth every Monday.      fluticasone (FLONASE) 50 mcg/actuation nasal spray 2 sprays by Each Nostril route Daily.  0    levocetirizine (XYZAL) 5 MG tablet Take 1 tablet (5 mg total) by mouth every evening. 30 tablet 11    montelukast (SINGULAIR) 10 mg tablet Take 10 mg by mouth once daily.  11    pantoprazole (PROTONIX) 40 MG tablet TAKE 1 TABLET BY MOUTH EVERY DAY (Patient " taking differently: Take 40 mg by mouth once daily.) 90 tablet 3    tiZANidine (ZANAFLEX) 4 MG tablet Take 4 mg by mouth every 6 (six) hours as needed.      tramadol-acetaminophen 37.5-325 mg (ULTRACET) 37.5-325 mg Tab Take 1 tablet by mouth 3 (three) times daily.      triamcinolone acetonide 0.1% (KENALOG) 0.1 % cream Apply topically 2 (two) times daily. Apply to rash on on hand and ankle as needed      acarbose (PRECOSE) 25 MG Tab Take 25 mg by mouth 3 (three) times daily.      blood sugar diagnostic Strp 1 strip by Misc.(Non-Drug; Combo Route) route 3 (three) times daily as needed. 30 each 3    blood-glucose meter kit Use as instructed 1 each 0    diltiaZEM (CARDIZEM) 120 MG tablet Take 120 mg by mouth once daily.      erythromycin (ROMYCIN) ophthalmic ointment Place into the right eye 2 (two) times daily.      spironolactone (ALDACTONE) 25 MG tablet TAKE 1 TABLET BY MOUTH EVERY DAY (Patient not taking: Reported on 11/8/2022) 90 tablet 3       Please address this information as you see fit.  Feel free to contact us if you have any questions or require assistance.    Asuncion Miranda  [phone]                .

## 2022-11-09 NOTE — ED NOTES
Dr Quintero discussed surgery procedure with patient. Pt to go to OR later today. Family at bedside. Pt to continue NPO status at this time. Call bell within reach.

## 2022-11-10 ENCOUNTER — PATIENT OUTREACH (OUTPATIENT)
Dept: ADMINISTRATIVE | Facility: OTHER | Age: 77
End: 2022-11-10
Payer: MEDICARE

## 2022-11-10 VITALS
WEIGHT: 145 LBS | HEART RATE: 75 BPM | BODY MASS INDEX: 25.69 KG/M2 | TEMPERATURE: 97 F | SYSTOLIC BLOOD PRESSURE: 125 MMHG | DIASTOLIC BLOOD PRESSURE: 79 MMHG | OXYGEN SATURATION: 97 % | RESPIRATION RATE: 16 BRPM

## 2022-11-10 LAB
ANION GAP SERPL CALC-SCNC: 6 MMOL/L (ref 8–16)
BASOPHILS # BLD AUTO: 0.08 K/UL (ref 0–0.2)
BASOPHILS NFR BLD: 1.2 % (ref 0–1.9)
BUN SERPL-MCNC: 10 MG/DL (ref 8–23)
CALCIUM SERPL-MCNC: 9.8 MG/DL (ref 8.7–10.5)
CHLORIDE SERPL-SCNC: 109 MMOL/L (ref 95–110)
CO2 SERPL-SCNC: 21 MMOL/L (ref 23–29)
CREAT SERPL-MCNC: 0.9 MG/DL (ref 0.5–1.4)
DIFFERENTIAL METHOD: ABNORMAL
EOSINOPHIL # BLD AUTO: 0.6 K/UL (ref 0–0.5)
EOSINOPHIL NFR BLD: 8.2 % (ref 0–8)
ERYTHROCYTE [DISTWIDTH] IN BLOOD BY AUTOMATED COUNT: 15.2 % (ref 11.5–14.5)
EST. GFR  (NO RACE VARIABLE): >60 ML/MIN/1.73 M^2
GLUCOSE SERPL-MCNC: 94 MG/DL (ref 70–110)
GRAM STN SPEC: NORMAL
GRAM STN SPEC: NORMAL
HCT VFR BLD AUTO: 36.7 % (ref 37–48.5)
HGB BLD-MCNC: 11.1 G/DL (ref 12–16)
IMM GRANULOCYTES # BLD AUTO: 0.02 K/UL (ref 0–0.04)
IMM GRANULOCYTES NFR BLD AUTO: 0.3 % (ref 0–0.5)
LYMPHOCYTES # BLD AUTO: 1.8 K/UL (ref 1–4.8)
LYMPHOCYTES NFR BLD: 26 % (ref 18–48)
MCH RBC QN AUTO: 24.8 PG (ref 27–31)
MCHC RBC AUTO-ENTMCNC: 30.2 G/DL (ref 32–36)
MCV RBC AUTO: 82 FL (ref 82–98)
MONOCYTES # BLD AUTO: 1 K/UL (ref 0.3–1)
MONOCYTES NFR BLD: 15 % (ref 4–15)
NEUTROPHILS # BLD AUTO: 3.3 K/UL (ref 1.8–7.7)
NEUTROPHILS NFR BLD: 49.3 % (ref 38–73)
NRBC BLD-RTO: 0 /100 WBC
PLATELET # BLD AUTO: 220 K/UL (ref 150–450)
PMV BLD AUTO: 10 FL (ref 9.2–12.9)
POTASSIUM SERPL-SCNC: 4.3 MMOL/L (ref 3.5–5.1)
RBC # BLD AUTO: 4.48 M/UL (ref 4–5.4)
SODIUM SERPL-SCNC: 136 MMOL/L (ref 136–145)
WBC # BLD AUTO: 6.73 K/UL (ref 3.9–12.7)

## 2022-11-10 PROCEDURE — 96361 HYDRATE IV INFUSION ADD-ON: CPT

## 2022-11-10 PROCEDURE — 11000001 HC ACUTE MED/SURG PRIVATE ROOM

## 2022-11-10 PROCEDURE — 80048 BASIC METABOLIC PNL TOTAL CA: CPT | Performed by: PODIATRIST

## 2022-11-10 PROCEDURE — 87040 BLOOD CULTURE FOR BACTERIA: CPT | Mod: 59 | Performed by: NURSE PRACTITIONER

## 2022-11-10 PROCEDURE — 36415 COLL VENOUS BLD VENIPUNCTURE: CPT | Performed by: NURSE PRACTITIONER

## 2022-11-10 PROCEDURE — 97162 PT EVAL MOD COMPLEX 30 MIN: CPT

## 2022-11-10 PROCEDURE — 97116 GAIT TRAINING THERAPY: CPT

## 2022-11-10 PROCEDURE — 99900035 HC TECH TIME PER 15 MIN (STAT)

## 2022-11-10 PROCEDURE — 97530 THERAPEUTIC ACTIVITIES: CPT

## 2022-11-10 PROCEDURE — 94761 N-INVAS EAR/PLS OXIMETRY MLT: CPT

## 2022-11-10 PROCEDURE — 36415 COLL VENOUS BLD VENIPUNCTURE: CPT | Performed by: PODIATRIST

## 2022-11-10 PROCEDURE — 25000003 PHARM REV CODE 250: Performed by: PODIATRIST

## 2022-11-10 PROCEDURE — 63600175 PHARM REV CODE 636 W HCPCS: Performed by: EMERGENCY MEDICINE

## 2022-11-10 PROCEDURE — 96366 THER/PROPH/DIAG IV INF ADDON: CPT

## 2022-11-10 PROCEDURE — 85025 COMPLETE CBC W/AUTO DIFF WBC: CPT | Performed by: PODIATRIST

## 2022-11-10 PROCEDURE — 25000003 PHARM REV CODE 250: Performed by: EMERGENCY MEDICINE

## 2022-11-10 RX ORDER — HYDROCODONE BITARTRATE AND ACETAMINOPHEN 10; 325 MG/1; MG/1
1 TABLET ORAL EVERY 4 HOURS PRN
Qty: 30 TABLET | Refills: 0 | Status: SHIPPED | OUTPATIENT
Start: 2022-11-10 | End: 2024-03-21

## 2022-11-10 RX ORDER — CIPROFLOXACIN 500 MG/1
500 TABLET ORAL EVERY 12 HOURS
Qty: 20 TABLET | Refills: 0 | Status: SHIPPED | OUTPATIENT
Start: 2022-11-10 | End: 2022-11-22 | Stop reason: ALTCHOICE

## 2022-11-10 RX ORDER — DOXYCYCLINE HYCLATE 100 MG
100 TABLET ORAL 2 TIMES DAILY
Qty: 20 TABLET | Refills: 0 | Status: SHIPPED | OUTPATIENT
Start: 2022-11-10 | End: 2024-03-21 | Stop reason: ALTCHOICE

## 2022-11-10 RX ADMIN — SODIUM CHLORIDE, SODIUM LACTATE, POTASSIUM CHLORIDE, AND CALCIUM CHLORIDE: .6; .31; .03; .02 INJECTION, SOLUTION INTRAVENOUS at 05:11

## 2022-11-10 RX ADMIN — MONTELUKAST 10 MG: 10 TABLET, FILM COATED ORAL at 09:11

## 2022-11-10 RX ADMIN — CIPROFLOXACIN 500 MG: 500 TABLET, FILM COATED ORAL at 09:11

## 2022-11-10 RX ADMIN — CLORAZEPATE DIPOTASSIUM 7.5 MG: 3.75 TABLET ORAL at 09:11

## 2022-11-10 RX ADMIN — SODIUM CHLORIDE, SODIUM LACTATE, POTASSIUM CHLORIDE, AND CALCIUM CHLORIDE: .6; .31; .03; .02 INJECTION, SOLUTION INTRAVENOUS at 12:11

## 2022-11-10 RX ADMIN — PANTOPRAZOLE SODIUM 40 MG: 40 TABLET, DELAYED RELEASE ORAL at 09:11

## 2022-11-10 RX ADMIN — BUSPIRONE HYDROCHLORIDE 5 MG: 5 TABLET ORAL at 09:11

## 2022-11-10 RX ADMIN — CLINDAMYCIN IN 5 PERCENT DEXTROSE 900 MG: 18 INJECTION, SOLUTION INTRAVENOUS at 09:11

## 2022-11-10 RX ADMIN — HYDROCODONE BITARTRATE AND ACETAMINOPHEN 1 TABLET: 10; 325 TABLET ORAL at 12:11

## 2022-11-10 RX ADMIN — HYDROCODONE BITARTRATE AND ACETAMINOPHEN 1 TABLET: 10; 325 TABLET ORAL at 04:11

## 2022-11-10 RX ADMIN — CLINDAMYCIN IN 5 PERCENT DEXTROSE 900 MG: 18 INJECTION, SOLUTION INTRAVENOUS at 12:11

## 2022-11-10 RX ADMIN — HYDROCODONE BITARTRATE AND ACETAMINOPHEN 1 TABLET: 10; 325 TABLET ORAL at 09:11

## 2022-11-10 RX ADMIN — AZELASTINE HYDROCHLORIDE 274 MCG: 137 SPRAY, METERED NASAL at 09:11

## 2022-11-10 NOTE — ANESTHESIA POSTPROCEDURE EVALUATION
"Anesthesia Post Evaluation    Patient: Autumn Harris    Procedure(s) Performed: Procedure(s) (LRB):  IRRIGATION AND DEBRIDEMENT, LOWER EXTREMITY, SECOND TOE (Right)    Final Anesthesia Type: general      Patient location during evaluation: floor  Patient participation: Yes- Able to Participate  Level of consciousness: awake and awake and alert  Post-procedure vital signs: reviewed and stable  Pain management: adequate  Airway patency: patent    PONV status at discharge: No PONV  Anesthetic complications: no      Cardiovascular status: blood pressure returned to baseline, hemodynamically stable and hypertensive  Respiratory status: unassisted  Hydration status: euvolemic  Follow-up not needed.  Comments: Discussed with pt use of general anesthesia via IV and that it was different from her  decades ago with "twilight anesthesia."  Discussed differences with pt and family.  No long-term effects of sedation that pt had been concerned about.           Vitals Value Taken Time   /85 22 1855   Temp 36.8 °C (98.2 °F) 22 1840   Pulse 84 22 1855   Resp 17 22 1855   SpO2 97 % 22 185         No case tracking events are documented in the log.      Pain/Bakari Score: Pain Rating Prior to Med Admin: 8 (2022  8:27 PM)        "

## 2022-11-10 NOTE — PLAN OF CARE
Ambulatory referral/consult to Home Health [[id number]] (Order [id number])  Outpatient Referral  Date and Time: 11/10/2022 12:26 PM Department: [facility] Acute Rel By/Authorizing: Alfa Quintero DPM     Linked Results    Procedure Abnormality Status   Ambulatory referral/consult to Home Health         Dept Order Information    Date Department   11/10/2022 [facility]             Order Information    Order Date/Time Release Date/Time Start Date/Time End Date/Time   11/10/22 12:26 PM None 11/10/2022 None           Order Details    Frequency Duration Priority Order Class   None None Routine Internal Referral       Quantity    Ordering Quantity   1     Quantity    Ordering Quantity Ordering Quantity   1 1         Order Details    Order ID   [id number]       Comments    General Outpatient     HOME HEALTH ORDERS   FACE TO FACE ENCOUNTER     Patient Name: Autumn Harris   YOB: 1945     PCP: Tonya Vega MD   PCP Address: [address] [place] [address]   PCP Phone Number: [phone]   PCP Fax: [phone]     Encounter Date: 11/10/2022     Admit to Home Health     Diagnoses:   Active Hospital Problems     *Open displaced fracture of middle phalanx of lesser toe of right foot [S92.521B]      POA: Yes       Resolved Hospital Problems   No resolved problems to display.       Future Appointments   11/22/2022 2:30 PM    Arjun Horn MD     [facility] CARDIO         [facility]   12/9/2022  11:00 AM   HOME MONITOR DEVICE CHECK* YARA Vincent Marisol   [unfilled]       I have seen and examined this patient face to face today. My clinical findings that support the need for the home health skilled services and home bound status are the following:   Medical restrictions requiring assistance of another human to leave home due to  Post surgery monitoring, Wound care needs, and Weight bearing restricted.       Allergies:Review of patient's  allergies indicates:    -- Pcn [penicillins]    -- Sulfa (sulfonamide antibiotics)     Medications- Review discharge medications with patient and family and provide education.       Labs: N/A     Diet: regular diet     Referrals/ Consults   Physical Therapy to evaluate and treat. Evaluate for home safety and equipment needs; Establish/upgrade home exercise program. Perform / instruct on therapeutic exercises, gait training, transfer training, and Range of Motion.   Occupational Therapy to evaluate and treat. Evaluate home environment for safety and equipment needs. Perform/Instruct on transfers, ADL training, ROM, and therapeutic exercises.     Activities: activity as tolerated and ambulation with surgical shoe and walker     Nursing:     Agency to admit patient within 24 hours     SN to complete comprehensive assessment including routine vital signs. Instruct on disease process and s/s of complications to report to MD. Review/verify medication list sent home with the patient at time of discharge and instruct patient/caregiver as needed. Frequency may be adjusted depending on start of care date.     Skilled nurse to perform up to 3 visits PRN for symptoms related to diagnosis     Notify MD if SBP > 160 or < 90; DBP > 90 or < 50; HR > 120 or < 50; Temp > 101; Other:         When multiple disciplines ordered:     Start of Care occurs on Sunday - Wednesday schedule remaining discipline evaluations as ordered on separate consecutive days following the start of care.     Thursday SOC -schedule subsequent evaluations Friday and Monday the following week.     Friday - Saturday SOC - schedule subsequent discipline evaluations on consecutive days starting Monday of the following week.     Referrals/ Consults:     Physical Therapy to evaluate and treat. Evaluate for home safety and equipment needs; Establish/upgrade home exercise program. Perform / instruct on therapeutic exercises, gait training, transfer training, and Range  "of Motion.   Occupational Therapy to evaluate and treat. Evaluate home environment for safety and equipment needs. Perform/Instruct on transfers, ADL training, ROM, and therapeutic exercises.       For all post-discharge communication and subsequent orders please contact patient's primary care physician. If unable to reach primary care physician or do not receive response within 30 minutes, please contact [facility]  for clinical staff order clarification     MISCELLANEOUS CARE:       WOUND CARE ORDERS   Cleanse right foot with hibiclens and NS. Apply xeroform over incision right second toe followed by 4" cast padding x 2 rolls to the foot lightly splinting the left second toe down and forming football dressing secured with ace wrap 3x/wk.     Medications: Please refer to discharge reconciled medications at time of discharge from discharge summary/AVS     I certify that this patient is confined to her home and needs related to left second toe open fracture post open fracture management of second toe intermittent skilled nursing care, physical therapy, and occupational therapy. intermittent skilled services as ordered.                Reprint Order Requisition    Ambulatory referral/consult to Home Health (Order #[id number]) on 11/10/22         Future Order Information    Expected Expires      11/11/2022 12/10/2023                   Associated Diagnoses     ICD-10-CM ICD-9-CM   Open displaced fracture of middle phalanx of lesser toe of right foot, initial encounter  - Primary     S92.521B 826.1   Postoperative state     Z98.890 V45.89         Ambulatory referral/consult to Home Health: Patient Communication     Not Released  Not seen         Order Questions    Question Answer   What Home Health Agency is the patient currently using? Other/External                    Process Instructions    Patient should be seen by the "Expected Date".      Collection Information                                  Patient " Details  MRN:[id number]  Name Legal Sex:  SSAutumn Sadler Female 1945          Address Phone Email [place]   [address] [place] [address] Home: [phone]  Work:   Cell: [phone] [email or web address] [place]          Inpatient Unit Inpatient Room Inpatient Bed    [facility] MEDICAL SURGICAL UNIT ACUTE K528 K528 A           PCP Allergies     Tonya Vega MD Penicillins, Sulfa (Sulfonamide Antibiotics)                Primary Visit Coverage    Payer Plan Sponsor Code Group Number Group Name   [employer] [facility]        Primary Visit Coverage Subscriber    Subscriber ID Subscriber Name Subscriber Address   [id number] [place] [address] [place] [id number]           Additional Information    Associated Reports   Priority and Order Details   Collection Information           ADT-Related Order Information         Encounter    View Encounter               Order Provider Info        Office phone Pager E-mail   Ordering User Alfa Quintreo [phone] [email or web address]   Authorizing Provider Alfa Quintero [phone] --   Attending Provider Alfa Quintero [phone] --     Ambulatory referral/consult to Home Health [[id number]]    Electronically signed by: Alfa Quintero DPM on 11/10/22 1226 Status: Active   Ordering user: Alfa Quintero DPM 11/10/22 1226 Ordering provider: Alfa Quintero DPM   Authorized by: Alfa Quintero DPM Ordering mode: Standard   Frequency:  11/10/22 -     Diagnoses   Open displaced fracture of middle phalanx of lesser toe of right foot, initial encounter [S92.527V]   Postoperative state [Z98.890]     Questionnaire    Question Answer   What Home Health Agency is the patient currently using? Other/External   Order comments: General Outpatient   HOME HEALTH ORDERS  FACE TO FACE ENCOUNTER Patient Name: Autumn Harris YOB: 1945 PCP: Tonya Vega MD PCP Address: [address] [address] [facility] [place] / [place] [address] PCP Phone Number: [phone] PCP Fax: [phone] Encounter Date: 11/10/2022 Admit to Home Health Diagnoses: Active Hospital Problems   *Open displaced fracture of middle phalanx of lesser toe of right foot [S92.521B]    POA: Yes Resolved Hospital Problems No resolved problems to display. Future Appointments 11/22/2022 2:30 PM    Arjun Horn MD     [facility] CARDIO         [facility] 12/9/2022  11:00 AM   HOME MONITOR DEVICE CHECK* YARA Vincent Marisol [unfilled] I have seen and examined this patient face to face today. My clinical findings that support the need for the home health skilled services and home bound status are the following: Medical restrictions requiring assistance of another human to leave home due to  Post surgery monitoring, Wound care needs, and Weight bearing restricted. Allergies:Review of patient's allergies indicates:  -- Pcn [penicillins]  -- Sulfa (sulfonamide antibiotics) Medications- Review discharge medications with patient and family and provide education.   Labs: N/A Diet: regular diet Referrals/ Consults Physical Therapy to evaluate and treat. Evaluate for home safety and equipment needs; Establish/upgrade home exercise program. Perform / instruct on therapeutic exercises, gait training, transfer training, and Range of Motion. Occupational Therapy to evaluate and treat. Evaluate home environment for safety and equipment needs. Perform/Instruct on transfers, ADL training, ROM, and therapeutic exercises. Activities: activity as tolerated and ambulation with surgical shoe and walker Nursing: Agency to admit patient within 24 hours SN to complete comprehensive assessment including routine vital signs. Instruct on disease process and s/s of complications to report to MD. Review/verify medication list sent  "home with the patient at time of discharge and instruct patient/caregiver as needed. Frequency may be adjusted depending on start of care date. Skilled nurse to perform up to 3 visits PRN for symptoms related to diagnosis Notify MD if SBP > 160 or < 90; DBP > 90 or < 50; HR > 120 or < 50; Temp > 101; Other:   When multiple disciplines ordered: Start of Care occurs on Sunday - Wednesday schedule remaining discipline evaluations as ordered on separate consecutive days following the start of care. Thursday SOC -schedule subsequent evaluations Friday and Monday the following week. Friday - Saturday SOC - schedule subsequent discipline evaluations on consecutive days starting Monday of the following week. Referrals/ Consults:   Physical Therapy to evaluate and treat. Evaluate for home safety and equipment needs; Establish/upgrade home exercise program. Perform / instruct on therapeutic exercises, gait training, transfer training, and Range of Motion. Occupational Therapy to evaluate and treat. Evaluate home environment for safety and equipment needs. Perform/Instruct on transfers, ADL training, ROM, and therapeutic exercises. For all post-discharge communication and subsequent orders please contact patient's primary care physician. If unable to reach primary care physician or do not receive response within 30 minutes, please contact [facility]  for clinical staff order clarification MISCELLANEOUS CARE: WOUND CARE ORDERS Cleanse right foot with hibiclens and NS. Apply xeroform over incision right second toe followed by 4" cast padding x 2 rolls to the foot lightly splinting the left second toe down and forming football dressing secured with ace wrap 3x/wk. Medications: Please refer to discharge reconciled medications at time of discharge from discharge summary/AVS I certify that this patient is confined to her home and needs related to left second toe open fracture post open fracture management of second toe " intermittent skilled nursing care, physical therapy, and occupational therapy. intermittent skilled services as ordered.       Order Diagnosis and CPT Display    Order Diagnosis: Open displaced fracture of middle phalanx of lesser toe of right foot, initial encounter [S92.521B (ICD-10-CM)]   Postoperative state [Z98.890 (ICD-10-CM)] CPT:                  Electronically signed by: Alfa Quintero DPM Lic # [id number] NPI: [id number]

## 2022-11-10 NOTE — ANESTHESIA POSTPROCEDURE EVALUATION
Anesthesia Post Evaluation    Patient: Autumn Harris    Procedure(s) Performed: Procedure(s) (LRB):  IRRIGATION AND DEBRIDEMENT, LOWER EXTREMITY, SECOND TOE (Right)    Final Anesthesia Type: MAC      Patient location during evaluation: GI PACU  Patient participation: Yes- Able to Participate  Level of consciousness: awake and alert and oriented  Post-procedure vital signs: reviewed and stable  Pain management: adequate  Airway patency: patent    PONV status at discharge: No PONV  Anesthetic complications: no      Cardiovascular status: blood pressure returned to baseline, hemodynamically stable and stable  Respiratory status: unassisted, spontaneous ventilation and room air  Hydration status: euvolemic  Follow-up not needed.          Vitals Value Taken Time   /74 11/09/22 1631   Temp 36.7 °C (98 °F) 11/09/22 0618   Pulse 85 11/09/22 1654   Resp 18 11/09/22 1511   SpO2 99 % 11/09/22 1654   Vitals shown include unvalidated device data.      No case tracking events are documented in the log.      Pain/Bakari Score: Pain Rating Prior to Med Admin: 6 (11/9/2022  3:11 PM)

## 2022-11-10 NOTE — PT/OT/SLP EVAL
"Physical Therapy Evaluation    Patient Name:  Autumn Harris   MRN:  [id number]    Recommendations:     Discharge Recommendations:  home health PT, home health OT   Discharge Equipment Recommendations: shower chair, walker, rolling   Barriers to discharge:  None    Assessment:     Autumn Harris is a 77 y.o. female admitted with a medical diagnosis of Open displaced fracture of middle phalanx of lesser toe of right foot.  She presents with the following impairments/functional limitations:  weakness, gait instability, impaired endurance, impaired balance, decreased lower extremity function, impaired self care skills, pain, impaired skin, orthopedic precautions, impaired functional mobility, edema Patient seen for physical therapy evaluation on this date.  Patient performed SBA bedside commode transfer with RW, and Gait with RW x 15' with CG/SBA, VC's for WBAT and technique with RW.  Patient returned back to supine with CGA.  Patient and daughter educated on elevation, and exercises for home.  Patient will need a RW and shower chair for home use.  Patient will also benefit from Home Health PT/OT after discharge..    Rehab Prognosis: Good; patient would benefit from acute skilled PT services to address these deficits and reach maximum level of function.    Recent Surgery: Procedure(s) (LRB):  IRRIGATION AND DEBRIDEMENT, LOWER EXTREMITY, SECOND TOE (Right) 1 Day Post-Op    Plan:     During this hospitalization, patient to be seen 5 x/week to address the identified rehab impairments via gait training, therapeutic activities, therapeutic exercises, neuromuscular re-education and progress toward the following goals:    Plan of Care Expires:  12/10/22    Subjective     Chief Complaint: "I am cold"  Patient/Family Comments/goals: To return home at OF  Pain/Comfort:  Pain Rating 1: 6/10  Location - Side 1: Right  Location 1: foot  Pain Addressed 1: Pre-medicate for activity, Distraction, Cessation of Activity  Pain Rating " Post-Intervention 1: 8/10    Patients cultural, spiritual, Rastafari conflicts given the current situation: no    Living Environment:  Patient lives alone in 1 SH, 5 ALPHONSO with B HR.  Bathrooms have a T/S combo and WIS.    Prior to admission, patients level of function was independent, + Driving, independent ADLs.  Equipment used at home: none.  DME owned (not currently used): none.  Upon discharge, patient will have assistance from family.    Objective:     Communicated with nurse Riojas prior to session.  Patient found  seated on bedside commode  with peripheral IV, telemetry, bed alarm  upon PT entry to room.    General Precautions: Standard, fall   Orthopedic Precautions:LLE weight bearing as tolerated (with surgical shoe)   Braces:  (Surgical Shoe)  Respiratory Status: Room air    Exams:  Cognitive Exam:  Patient is oriented to Person, Place, Time, and Situation  Fine Motor Coordination:    -       Intact  Left hand thumb/finger opposition skills and Right hand thumb/finger opposition skills  Gross Motor Coordination:  WFL, except R foot  Postural Exam:  Patient presented with the following abnormalities:    -       Rounded shoulders  -       Forward head  Sensation:    -       Intact  Skin Integrity/Edema:      -       Skin integrity:  Ace bandage with surgical shoe on R foot  -       Edema: Mild R foot  RLE ROM: WFL  RLE Strength: Hip and Knee 4/5.  Ankle NT, but moves voluntarily  LLE ROM: WFL  LLE Strength: WFL    Functional Mobility:  Bed Mobility:     Sit to Supine: contact guard assistance  Transfers:     Sit to Stand:  contact guard assistance with rolling walker  Gait: 15' with RW and  CG/SBA and VC's for sequencing gait with RW.    Balance: Seated EOB:  no LOB, SBA   Standing:  no LOB, CGA      AM-PAC 6 CLICK MOBILITY  Total Score:18       Treatment & Education:  Patient seen for physical therapy evaluation on this date.  Patient's daughter also present.  Patient and daughter educated on WB status  and use of elevation while resting at home.  Patient trained for use of RW with transfers and gait.  Mobility as above.  Patient also educated to perform APs and LAQs as able.      Patient left supine with all lines intact, call button in reach, bed alarm on, and nurse notified.    GOALS:   Multidisciplinary Problems       Physical Therapy Goals          Problem: Physical Therapy    Goal Priority Disciplines Outcome Goal Variances Interventions   Physical Therapy Goal     PT, PT/OT Ongoing, Progressing     Description: Goals to be met by: 12/10/2022     Patient will increase functional independence with mobility by performin. Supine to sit with [place]  2. Sit to supine with [place]  3. Rolling to Left and Right with [place].  4. Sit to stand transfer with Modified [place]  5. Gait  x 150 feet with Modified [place] using Rolling Walker.   65. Ascend/descend 5 stair with bilateral Handrails Supervision                          History:     Past Medical History:   Diagnosis Date    Anxiety     CKD (chronic kidney disease) stage 3, GFR 30-59 ml/min     COPD (chronic obstructive pulmonary disease)     Depression     Encounter for blood transfusion     Fibromyalgia     Hematuria     High cholesterol     Hypertension     Oral cancer     Proteinuria     Sore throat 7/3/2019    Urinary tract infection     Vitamin D deficiency        Past Surgical History:   Procedure Laterality Date    ABDOMINAL SURGERY      [place]    HYSTERECTOMY      INSERTION OF IMPLANTABLE LOOP RECORDER N/A 10/15/2021    Procedure: Insertion, Implantable Loop Recorder;  Surgeon: Cristhian Alvares MD;  Location: [facility] CATH LAB/EP;  Service: Cardiology;  Laterality: N/A;    OOPHORECTOMY      only one removed    Stomach Ulcer Surgery         Time Tracking:     PT Received On: 11/10/22  PT Start Time: 0955     PT Stop Time: 1033  PT Total Time (min): 38 min     Billable Minutes: Evaluation 15, Gait Training 15, and Therapeutic  Activity 8      11/10/2022

## 2022-11-10 NOTE — CONSULTS
[facility]  Consult Note    Patient Name: Autumn Harris  MRN: [id number]  Admission Date: 11/8/2022  Hospital Length of Stay: 0 days  Attending Physician: Alfa Quintero DPM   Primary Care Provider: Tonya Vega MD           Patient information was obtained from patient and ER records.     Consults  Subjective:     Principal Problem: Open displaced fracture of middle phalanx of lesser toe of right foot    Chief Complaint:   Chief Complaint   Patient presents with    Toe Injury     Right 2nd toe pain swelling and wound present after hitting toe on thick rug on sun, + pain and swelling noted         HPI: This is a pleasant 77 year old WF with a history of COPD, HTN, HLD, Anxiety, and CKD Stage III who presented to the Ed at the direction of the urgent care after she showed up to urgent care with CC of right toe pain after she bumped her toe on a rug suffering open toe fracture with bone exposure. Podiatry contacted by ED MD who admitted the patient to service for surgical I&D of toe fracture. She is POD # 1 I&D. Hospitalist team consulted for medical management.               Past Medical History:   Diagnosis Date    Anxiety     CKD (chronic kidney disease) stage 3, GFR 30-59 ml/min     COPD (chronic obstructive pulmonary disease)     Depression     Encounter for blood transfusion     Fibromyalgia     Hematuria     High cholesterol     Hypertension     Oral cancer     Proteinuria     Sore throat 7/3/2019    Urinary tract infection     Vitamin D deficiency        Past Surgical History:   Procedure Laterality Date    ABDOMINAL SURGERY      [place]    HYSTERECTOMY      INSERTION OF IMPLANTABLE LOOP RECORDER N/A 10/15/2021    Procedure: Insertion, Implantable Loop Recorder;  Surgeon: Cristhian Alvares MD;  Location: [facility] CATH LAB/EP;  Service: Cardiology;  Laterality: N/A;    IRRIGATION AND DEBRIDEMENT OF LOWER EXTREMITY Right 11/9/2022    Procedure: IRRIGATION AND  DEBRIDEMENT, LOWER EXTREMITY, SECOND TOE;  Surgeon: Alfa Quintero DPM;  Location: [facility];  Service: Podiatry;  Laterality: Right;  micro sagittal saw, vancomycin powder    OOPHORECTOMY      only one removed    Stomach Ulcer Surgery         Review of patient's allergies indicates:   Allergen Reactions    Pcn [penicillins]     Sulfa (sulfonamide antibiotics)        No current facility-administered medications on file prior to encounter.     Current Outpatient Medications on File Prior to Encounter   Medication Sig    albuterol (VENTOLIN HFA) 90 mcg/actuation inhaler Inhale 2 puffs into the lungs every 6 (six) hours as needed for Wheezing. Rescue    azelastine (ASTELIN) 137 mcg (0.1 %) nasal spray 2 sprays by Nasal route 2 (two) times daily.    busPIRone (BUSPAR) 5 MG Tab Take 5 mg by mouth 2 (two) times daily.    clorazepate (TRANXENE) 3.75 MG Tab Take 7.5 mg by mouth 2 (two) times daily. 2 tablets    ergocalciferol (ERGOCALCIFEROL) 50,000 unit Cap Take 50,000 Units by mouth every Monday.    fluticasone (FLONASE) 50 mcg/actuation nasal spray 2 sprays by Each Nostril route Daily.    levocetirizine (XYZAL) 5 MG tablet Take 1 tablet (5 mg total) by mouth every evening.    montelukast (SINGULAIR) 10 mg tablet Take 10 mg by mouth once daily.    pantoprazole (PROTONIX) 40 MG tablet TAKE 1 TABLET BY MOUTH EVERY DAY (Patient taking differently: Take 40 mg by mouth once daily.)    triamcinolone acetonide 0.1% (KENALOG) 0.1 % cream Apply topically 2 (two) times daily. Apply to rash on on hand and ankle as needed    [DISCONTINUED] tiZANidine (ZANAFLEX) 4 MG tablet Take 4 mg by mouth every 6 (six) hours as needed.    [DISCONTINUED] tramadol-acetaminophen 37.5-325 mg (ULTRACET) 37.5-325 mg Tab Take 1 tablet by mouth 3 (three) times daily.    blood sugar diagnostic Strp 1 strip by Misc.(Non-Drug; Combo Route) route 3 (three) times daily as needed.    blood-glucose meter kit Use as instructed     Family  History       Problem Relation (Age of Onset)    Heart disease Mother, Brother, Maternal Aunt    Lung cancer Brother    Pacemaker/defibrilator Brother          Tobacco Use    Smoking status: Former     Packs/day: 1.50     Years: 45.00     Pack years: 67.50     Types: Cigarettes     Quit date: 1995     Years since quittin.3    Smokeless tobacco: Never    Tobacco comments:     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   Substance and Sexual Activity    Alcohol use: No    Drug use: Never    Sexual activity: Not on file     Review of Systems   Constitutional:  Positive for activity change.   HENT: Negative.     Eyes: Negative.    Respiratory: Negative.     Cardiovascular: Negative.    Gastrointestinal: Negative.    Endocrine: Negative.    Genitourinary: Negative.    Musculoskeletal:  Positive for gait problem.   Skin:  Positive for wound.   Allergic/Immunologic: Negative.    Hematological: Negative.    Psychiatric/Behavioral: Negative.     Objective:     Vital Signs (Most Recent):  Temp: 97.3 °F (36.3 °C) (11/10/22 1213)  Pulse: 75 (11/10/22 1213)  Resp: 16 (11/10/22 1213)  BP: 125/79 (11/10/22 1213)  SpO2: 97 % (11/10/22 1213) Vital Signs (24h Range):  Temp:  [97.3 °F (36.3 °C)-98.2 °F (36.8 °C)] 97.3 °F (36.3 °C)  Pulse:  [75-91] 75  Resp:  [16-18] 16  SpO2:  [92 %-98 %] 97 %  BP: (123-170)/(63-86) 125/79     Weight: 65.8 kg (145 lb)  Body mass index is 25.69 kg/m².    Physical Exam  Constitutional:       Appearance: Normal appearance.   HENT:      Head: Normocephalic and atraumatic.      Nose: Nose normal.      Mouth/Throat:      Mouth: Mucous membranes are moist.   Eyes:      Extraocular Movements: Extraocular movements intact.      Pupils: Pupils are equal, round, and reactive to light.   Cardiovascular:      Rate and Rhythm: Normal rate and regular rhythm.      Pulses: Normal pulses.      Heart sounds: Normal heart sounds.   Pulmonary:      Effort: Pulmonary effort is normal.      Breath  sounds: Normal breath sounds. No wheezing, rhonchi or rales.   Chest:      Chest wall: No tenderness.   Abdominal:      Palpations: Abdomen is soft.      Tenderness: There is no abdominal tenderness. There is no guarding or rebound.      Hernia: No hernia is present.   Genitourinary:     Comments: deferred  Musculoskeletal:         General: Normal range of motion.      Cervical back: Normal range of motion.   Skin:     General: Skin is warm and dry.      Capillary Refill: Capillary refill takes less than 2 seconds.      Comments: Surgical dressing right foot with Darco shoe in place   Neurological:      Mental Status: She is alert and oriented to person, place, and time.   Psychiatric:         Mood and Affect: Mood normal.       Significant Labs:   Recent Labs   Lab 11/08/22  1418 11/10/22  0729   WBC 7.40 6.73   HGB 11.8* 11.1*   HCT 38.4 36.7*    220   MONO 9.1  0.7 15.0  1.0     Recent Labs   Lab 11/08/22  1418 11/10/22  0729   * 136   K 4.8 4.3    109   CO2 14* 21*   BUN 18 10   CREATININE 1.4 0.9   CALCIUM 10.1 9.8   PROT 8.2  --    BILITOT 0.3  --    ALKPHOS 95  --    ALT 11  --    AST 29  --      Microbiology Results (last 7 days)       Procedure Component Value Units Date/Time    Blood culture [[id number]] Collected: 11/10/22 1023    Order Status: Sent Specimen: Blood from Antecubital, Right Hand Updated: 11/10/22 1023    Blood culture [[id number]] Collected: 11/10/22 1023    Order Status: Sent Specimen: Blood Updated: 11/10/22 1023    Gram stain [[id number]] Collected: 11/09/22 1848    Order Status: Completed Specimen: Bone from Toe, Right Foot Updated: 11/10/22 0323     Gram Stain Result No WBC's      No organisms seen    Culture, Anaerobe [[id number]] Collected: 11/09/22 1848    Order Status: Sent Specimen: Bone from Toe, Right Foot Updated: 11/10/22 0221    Aerobic culture [[id number]] Collected: 11/09/22 1848    Order Status: Sent Specimen: Bone from Toe, Right Foot Updated: 11/10/22  0221    AFB Culture & Smear [[id number]] Collected: 11/09/22 1848    Order Status: Sent Specimen: Bone from Toe, Right Foot Updated: 11/10/22 0221    Fungus culture [[id number]] Collected: 11/09/22 1848    Order Status: Sent Specimen: Bone from Toe, Right Foot Updated: 11/10/22 0221              Significant Imaging: I have reviewed all pertinent imaging results/findings within the past 24 hours.  Imaging Results              X-Ray Foot Complete Right (Final result)  Result time 11/08/22 14:48:12      Final result by Valerio Astorga MD (11/08/22 14:48:12)                   Impression:      1. Second toe fracture as above.      Electronically signed by: Valerio Astorga MD  Date:    11/08/2022  Time:    14:48               Narrative:    EXAMINATION:  XR FOOT COMPLETE 3 VIEW RIGHT    CLINICAL HISTORY:  . Pain in unspecified foot    TECHNIQUE:  AP, lateral, and oblique views of the right foot were performed.    COMPARISON:  None    FINDINGS:  Three views right foot.    There is osteopenia.  There is fracture of the middle phalanx of the 2nd toe.  Fracture plane approaches and may involve the D IP articular surface.  No dislocation.  There is edema about the site.  There are degenerative changes of the foot.                                        Assessment/Plan:     * Open displaced fracture of middle phalanx of lesser toe of right foot  POD # 1  Tissue cultures pending  Darco shoe in place  Further recs as per podiatry      Anxiety  Stable  Resume home meds      Essential hypertension  Patient has a current diagnosis of HTN which is controlled.  Latest blood pressure and vitals reviewed-   Temp:  [97.3 °F (36.3 °C)-98.2 °F (36.8 °C)]   Pulse:  [75-91]   Resp:  [16-18]   BP: (123-170)/(63-86)   SpO2:  [92 %-98 %] .     Home meds for hypertension were reviewed and noted below.       Medication adjustment for hospital antihypertensives is as follows-   Continue home meds                VTE Risk Mitigation (From admission,  onward)         Ordered     IP VTE HIGH RISK PATIENT  Once         11/09/22 1653     Place sequential compression device  Until discontinued         11/09/22 1653                    Thank you for your consult. I will sign off. Please contact us if you have any additional questions.    Trish Olsen NP  Department of Hospital Medicine   [facility]

## 2022-11-10 NOTE — PLAN OF CARE
Autumn Harris MRN:[id number] ([facility]#[id number]) (77 y.o. F) (Adm: 22)  [facility] [id number]  Patient Demographics    Patient Name   Autumn Harris Legal Sex   Female          Age   1945 (77 y.o.) N    Address   [address] [place] [address] Phone   [phone] (Home)   [phone] (Mobile) *Preferred*     Patient Demographics    Address   [address] [place] [address] Phone   [phone] (Home)   [phone] (Mobile) *Preferred* E-mail Address   [email or web address]     PCP and Center    Primary Care Provider   Tonya Vega MD Phone   [phone] Center   [facility]     Patient Contacts    Name Relation Home Work Mobile   Sonia Mehta Sister [phone]     Micaela Harris Daughter [phone]     Raven Olmstead Daughter   [phone]     Documents on File     Status Date Received Description   Documents for the Patient   Notice of Privacy Pract Ackn Received 13    Insurance Documents Received 13 [employer]   Patient ID Received () 13 ladl   Advance Directive Acknowledgement Not Received     Clinic Authorization Received () 13    Provider Based Acknowledgement      HIM MARTELL Authorization  14    MARTELL Physician Office  14    Clinic Authorization Received () 14    Miscellaneous Documents clinic   MAMMOGRAM ORDER    Clinic Authorization Received () 06/25/15    Patient ID Received () 16 LADL 2018   Insurance Documents Received 16 PH CARD   Notice of Privacy Pract Ackn Signed 09/25/15 HIPAA/SELF   Disability Form Not Received 16    Financial Release of Information Not Received 16    Advance Directives and Living Will Not Received     Consents Received () 16    Plain Language Summary English No, Patient Declined Print () 16    Clinic Authorization Signed () 16    Consents Received () 16     Consents Received () 17    Plain Language Summary English No, Patient Declined Print () 17    Consents Received () 17    Clinic Authorization Signed () 17    Notice of Privacy Pract Acsaray Signed 17    Plain Language Summary English No, Patient Declined Print () 17    Advance Beneficiary Notice      Outpatient Authorization [facility]      Involvement In Care      Provider Based Acknowledgement [facility]      [facility] Provider Based Facility Disclosure Signed () 17 Balance Billing   Insurance Documents   UM Worksheet   Plain Language Summary English No, Patient Declined Print () 17 fin asst info   [facility] Provider Based Facility Disclosure Contracted () 18    Plain Language Summary English No, Patient Declined Print () 18    Plain Language Summary English No, Patient Declined Print () 18    [facility] Provider Based Facility Disclosure Signed () 18    Plain Language Summary English No, Patient Declined Print () 18    [facility] Provider Based Facility Disclosure Contracted () 10/04/18    Plain Language Summary English No, Patient Declined Print () 10/04/18    [facility] Contracted Facility Disclosure Signed () 18    Plain Language Summary English Acknowledged () 18    Clinic Authorization Signed () 18    Patient ID Received () 18 drivers license   Plain Language Summary English Acknowledged () 19    Miscellaneous Documents clinic   mammo hx sheet   Plain Language Summary English Acknowledged () 19    Patient ID Received 21 LA DL EXP 24   Plain Language Summary English Acknowledged () 19 FIN ASST INFO   Plain Language Summary English Acknowledged () 10/17/19    [facility] Contracted Facility Disclosure Signed 10/17/19    Plain Language Summary English Acknowledged () 10/30/19  FIN Ast Info   HIM MARTELL Authorization  19    Plain Language Summary English Acknowledged () 19    Clinic Authorization Signed () 19    Plain Language Summary English Acknowledged () 20    Insurance Documents Received 20 Medicare a&b 2020   Consent Form Not Received 21    Other Not Received 21    HIPAA Notice of Privacy Not Received 21    Insurance Documents Received 21 ins card phn   Patient ID Received 21 drivers license   Clinic Authorization Signed () 21    Plain Language Summary English Acknowledged () 21    Miscellaneous Documents clinic Received 21 lab results from Dr. Veag   Miscellaneous Documents clinic Received 21 Privacy of policies   Miscellaneous Documents clinic Received 21 CKD questionnaire   Miscellaneous Documents clinic Received 21 Surgery history   Miscellaneous Documents clinic Received 21 Kidney Smart   Plain Language Summary English Acknowledged () 03/15/21    Plain Language Summary English Acknowledged () 21    Plain Language Summary English Acknowledged () 21    Plain Language Summary English Acknowledged () 21    Plain Language Summary English Acknowledged () 21    Plain Language Summary English Acknowledged () 21    Plain Language Summary English Acknowledged () 21    Plain Language Summary English Acknowledged () 21    Plain Language Summary English Acknowledged () 10/05/21    Plain Language Summary English Acknowledged () 10/06/21    Plain Language Summary English Acknowledged () 10/11/21    Miscellaneous Documents clinic Received 10/11/21 MRI Questionnaire   Plain Language Summary English Acknowledged () 10/12/21    Plain Language Summary English Acknowledged () 10/15/21    Plain Language Summary English Acknowledged  () 10/25/21    Plain Language Summary English Acknowledged (Future) 21    [facility] Notice of Privacy Practice Signed 21 Verbal Consent/Self   [facility] Clinic Authorization      COVID Vaccine Acknowledgement      Consents      Plain Language Summary English Acknowledged () 22    Clinic Authorization      Plain Language Summary English      [facility] Not Contracted Facility Disclosure      Plain Language Summary English Acknowledged () 22    Patient Photo   Photo of Patient   HIM Release of Information Output  (Deleted) 19 Requested records   HIM Release of Information Output  (Deleted) 19 Requested records   HIM Release of Information Output  (Deleted) 10/11/21    Documents for the Encounter   Medicare Lifetime Reserve Form      Important Medicare Message Printed at Discharge      Advance Beneficiary Notice      Hospital Authorization Signed 22    Photographs      Anesthesia Consent (In Person) Signed 22      Admission Information    Current Information    Attending Provider Admitting Provider Admission Type Admission Status   REAGAN Mei DPM Emergency Confirmed Admission          Admission Date/Time Discharge Date Hospital Service Auth/Cert Status   22  02:41 PM  Podiatry Incomplete          Hospital Area Unit Room/Bed    [facility] MEDICAL SURGICAL UNIT ACUTE K528/K528 A              Admission    Complaint        Hospital Account    Name Acct ID Class Status Primary Coverage   Autumn Harris [id number] IP- Inpatient Open [employer] - [employer]            Guarantor Account (for Hospital Account #[id number])    Name Relation to Pt Service Area Active? Acct Type   Autumn Harris Self OHSSA Yes Personal/Family   Address Phone     [address] [place] [address] [phone](H)              Coverage Information (for Hospital Account #[id number])    F/O Payor/Plan  Precert #   [employer] [facility]    Subscriber Subscriber #   Autumn Harris [id number]   Address Phone   [address]   [place] [address] [phone]      Inpatient consult to Social Work [[id number]] (Order [id number])  Consult  Date and Time: 11/10/2022 11:53 AM Department: [facility] Medical Surgical Unit Acute Rel By: Tyesha Worley RN (auto-released) Authorizing: Alfa Quintero DPM     Order Information    Order Date/Time Release Date/Time Start Date/Time End Date/Time   11/10/22 11:53 AM 11/10/22 11:53 AM 11/10/22 11:53 AM 11/10/22 11:53 AM     Order Details    Frequency Duration Priority Order Class   Once 1  occurrence Routine Hospital Performed     Original Order    Ordered On Ordered By    11/10/2022 11:53 AM Tyesha Worley RN             Comments    Patient will be discharged today 11/10/22.   She will need Home Health SN/PT/OT at discharge. Will send orders when available.              Inpatient consult to Social Work: Patient Communication     Not Released  Not seen     Order Questions    Question Answer   Reason for Consult HOME HEALTH                      Collection Information          Consult Order Info    ID Description Priority Start Date Start Time   [id number] Inpatient consult to Social Work Routine 11/10/2022 11:53 AM   Provider Specialty Referred to   ______________________________________ _____________________________________                       Cosign Order Info    Action Created on Order Mode Entered by Responsible Provider Signed by Signed on   Ordering 11/10/22 1153 Per nursing order: no cosign required Tyesha Worley RN              Patient Information    Patient Name   Autumn Harris Legal Sex   Female    1945 [facility] [place]          Additional Information    Associated Reports   View Parent Encounter   Priority and Order Details         Order Provider Info        Office phone Pager E-mail   Ordering User Tyesha Worley RN -- --  [email or web address]   Authorizing Provider Alfa Quintero -[phone] --   Attending Provider Alfa Quintero [phone] --       Inpatient consult to Social Work [[id number]]    Electronically signed by: Tyesha Worley RN on 11/10/22 1153 Status: Active   Ordering user: Tyesha Worley RN 11/10/22 2533 Ordering provider: Alfa Quintero DPM   Authorized by: Alfa Quintero DPM Ordering mode: Per nursing order: no cosign required     Questionnaire    Question Answer   Reason for Consult HOME HEALTH   Order comments: Patient will be discharged today 11/10/22. She will need Home Health SN/PT/OT at discharge. Will send orders when available.

## 2022-11-10 NOTE — HPI
This is a pleasant 77 year old WF with a history of COPD, HTN, HLD, Anxiety, and CKD Stage III who presented to the Ed at the direction of the urgent care after she showed up to urgent care with CC of right toe pain after she bumped her toe on a rug suffering open toe fracture with bone exposure. Podiatry contacted by ED MD who admitted the patient to service for surgical I&D of toe fracture. She is POD # 1 I&D. Hospitalist team consulted for medical management.

## 2022-11-10 NOTE — PLAN OF CARE
Discharge orders noted. Additional clinical references attached. Patient's discharge instructions given by bedside RN and reviewed by this VN with patient. Family at bedside. Education provided on home care instructions, new and previous medications, diagnosis, when to seek medical attention, and follow-up appointments. New medications delivered by pharmacy. Patient verbalized understanding and teach back method was used. Patient's family to provide ride/transportation home. All questions answered. Floor nurse notified.

## 2022-11-10 NOTE — PROGRESS NOTES
IP Liaison - Initial Visit Note    Patient: Autumn Harris  MRN:  [id number]  Date of Service:  11/10/2022  Completed by:  KAYLIE Rouse    Reason for Visit   Patient presents with    IP Liaison Initial Visit       RSW met with patient at bedside in order to complete SDOH questionnaire and liaison assessment.  Pt has identified no social barriers to care. Pt sister assists pt with all social needs. Pt has children who are able to assist pt with social needs as well. Per pt, pt is not in need of resources at this time.    The following were addressed during this visit:  - Review SDOH Questions   - Complete patient assessment   - Complete initial visit with patient        Patient Summary     IP Liaison Patient Assessment    General  Level of Caregiver support: Member independent and does not need caregiver assistance  Have you had to make a decision between paying for any of the following in the last 2 months?: None  Transportation means: Family  Employment status: Retired and not working  Assessments  Was the PHQ Depression Screening completed this visit?: No  Was the COLEMAN-7 Screening completed this visit?: No       KAYLIE Rouse

## 2022-11-10 NOTE — PLAN OF CARE
Patient seen for physical therapy evaluation on this date.  Patient performed SBA bedside commode transfer with RW, and Gait with RW x 15' with CG/SBA, VC's for WBAT and technique with RW.  Patient returned back to supine with CGA.  Patient and daughter educated on elevation, and exercises for home.  Patient will need a RW and shower chair for home use.  Patient will also benefit from Home Health PT/OT after discharge.      Problem: Physical Therapy  Goal: Physical Therapy Goal  Description: Goals to be met by: 12/10/2022     Patient will increase functional independence with mobility by performin. Supine to sit with [place]  2. Sit to supine with [place]  3. Rolling to Left and Right with [place].  4. Sit to stand transfer with Modified [place]  5. Gait  x 150 feet with Modified [place] using Rolling Walker.   65. Ascend/descend 5 stair with bilateral Handrails Supervision     Outcome: Ongoing, Progressing

## 2022-11-10 NOTE — ASSESSMENT & PLAN NOTE
Patient has a current diagnosis of HTN which is controlled.  Latest blood pressure and vitals reviewed-   Temp:  [97.3 °F (36.3 °C)-98.2 °F (36.8 °C)]   Pulse:  [75-91]   Resp:  [16-18]   BP: (123-170)/(63-86)   SpO2:  [92 %-98 %] .     Home meds for hypertension were reviewed and noted below.       Medication adjustment for hospital antihypertensives is as follows-   Continue home meds

## 2022-11-10 NOTE — SUBJECTIVE & OBJECTIVE
Past Medical History:   Diagnosis Date    Anxiety     CKD (chronic kidney disease) stage 3, GFR 30-59 ml/min     COPD (chronic obstructive pulmonary disease)     Depression     Encounter for blood transfusion     Fibromyalgia     Hematuria     High cholesterol     Hypertension     Oral cancer     Proteinuria     Sore throat 7/3/2019    Urinary tract infection     Vitamin D deficiency        Past Surgical History:   Procedure Laterality Date    ABDOMINAL SURGERY      [place]    HYSTERECTOMY      INSERTION OF IMPLANTABLE LOOP RECORDER N/A 10/15/2021    Procedure: Insertion, Implantable Loop Recorder;  Surgeon: Cristhian Alvares MD;  Location: [facility] CATH LAB/EP;  Service: Cardiology;  Laterality: N/A;    IRRIGATION AND DEBRIDEMENT OF LOWER EXTREMITY Right 11/9/2022    Procedure: IRRIGATION AND DEBRIDEMENT, LOWER EXTREMITY, SECOND TOE;  Surgeon: Alfa Quintero DPM;  Location: [facility] OR;  Service: Podiatry;  Laterality: Right;  micro sagittal saw, vancomycin powder    OOPHORECTOMY      only one removed    Stomach Ulcer Surgery         Review of patient's allergies indicates:   Allergen Reactions    Pcn [penicillins]     Sulfa (sulfonamide antibiotics)        No current facility-administered medications on file prior to encounter.     Current Outpatient Medications on File Prior to Encounter   Medication Sig    albuterol (VENTOLIN HFA) 90 mcg/actuation inhaler Inhale 2 puffs into the lungs every 6 (six) hours as needed for Wheezing. Rescue    azelastine (ASTELIN) 137 mcg (0.1 %) nasal spray 2 sprays by Nasal route 2 (two) times daily.    busPIRone (BUSPAR) 5 MG Tab Take 5 mg by mouth 2 (two) times daily.    clorazepate (TRANXENE) 3.75 MG Tab Take 7.5 mg by mouth 2 (two) times daily. 2 tablets    ergocalciferol (ERGOCALCIFEROL) 50,000 unit Cap Take 50,000 Units by mouth every Monday.    fluticasone (FLONASE) 50 mcg/actuation nasal spray 2 sprays by Each Nostril route Daily.    levocetirizine (XYZAL) 5 MG tablet Take 1  tablet (5 mg total) by mouth every evening.    montelukast (SINGULAIR) 10 mg tablet Take 10 mg by mouth once daily.    pantoprazole (PROTONIX) 40 MG tablet TAKE 1 TABLET BY MOUTH EVERY DAY (Patient taking differently: Take 40 mg by mouth once daily.)    triamcinolone acetonide 0.1% (KENALOG) 0.1 % cream Apply topically 2 (two) times daily. Apply to rash on on hand and ankle as needed    [DISCONTINUED] tiZANidine (ZANAFLEX) 4 MG tablet Take 4 mg by mouth every 6 (six) hours as needed.    [DISCONTINUED] tramadol-acetaminophen 37.5-325 mg (ULTRACET) 37.5-325 mg Tab Take 1 tablet by mouth 3 (three) times daily.    blood sugar diagnostic Strp 1 strip by Misc.(Non-Drug; Combo Route) route 3 (three) times daily as needed.    blood-glucose meter kit Use as instructed     Family History       Problem Relation (Age of Onset)    Heart disease Mother, Brother, Maternal Aunt    Lung cancer Brother    Pacemaker/defibrilator Brother          Tobacco Use    Smoking status: Former     Packs/day: 1.50     Years: 45.00     Pack years: 67.50     Types: Cigarettes     Quit date: 1995     Years since quittin.3    Smokeless tobacco: Never    Tobacco comments:     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   Substance and Sexual Activity    Alcohol use: No    Drug use: Never    Sexual activity: Not on file     Review of Systems   Constitutional:  Positive for activity change.   HENT: Negative.     Eyes: Negative.    Respiratory: Negative.     Cardiovascular: Negative.    Gastrointestinal: Negative.    Endocrine: Negative.    Genitourinary: Negative.    Musculoskeletal:  Positive for gait problem.   Skin:  Positive for wound.   Allergic/Immunologic: Negative.    Hematological: Negative.    Psychiatric/Behavioral: Negative.     Objective:     Vital Signs (Most Recent):  Temp: 97.3 °F (36.3 °C) (11/10/22 1213)  Pulse: 75 (11/10/22 1213)  Resp: 16 (11/10/22 1213)  BP: 125/79 (11/10/22 1213)  SpO2: 97 % (11/10/22 1213) Vital  Signs (24h Range):  Temp:  [97.3 °F (36.3 °C)-98.2 °F (36.8 °C)] 97.3 °F (36.3 °C)  Pulse:  [75-91] 75  Resp:  [16-18] 16  SpO2:  [92 %-98 %] 97 %  BP: (123-170)/(63-86) 125/79     Weight: 65.8 kg (145 lb)  Body mass index is 25.69 kg/m².    Physical Exam  Constitutional:       Appearance: Normal appearance.   HENT:      Head: Normocephalic and atraumatic.      Nose: Nose normal.      Mouth/Throat:      Mouth: Mucous membranes are moist.   Eyes:      Extraocular Movements: Extraocular movements intact.      Pupils: Pupils are equal, round, and reactive to light.   Cardiovascular:      Rate and Rhythm: Normal rate and regular rhythm.      Pulses: Normal pulses.      Heart sounds: Normal heart sounds.   Pulmonary:      Effort: Pulmonary effort is normal.      Breath sounds: Normal breath sounds. No wheezing, rhonchi or rales.   Chest:      Chest wall: No tenderness.   Abdominal:      Palpations: Abdomen is soft.      Tenderness: There is no abdominal tenderness. There is no guarding or rebound.      Hernia: No hernia is present.   Genitourinary:     Comments: deferred  Musculoskeletal:         General: Normal range of motion.      Cervical back: Normal range of motion.   Skin:     General: Skin is warm and dry.      Capillary Refill: Capillary refill takes less than 2 seconds.      Comments: Surgical dressing right foot with Darco shoe in place   Neurological:      Mental Status: She is alert and oriented to person, place, and time.   Psychiatric:         Mood and Affect: Mood normal.       Significant Labs:   Recent Labs   Lab 11/08/22  1418 11/10/22  0729   WBC 7.40 6.73   HGB 11.8* 11.1*   HCT 38.4 36.7*    220   MONO 9.1  0.7 15.0  1.0     Recent Labs   Lab 11/08/22  1418 11/10/22  0729   * 136   K 4.8 4.3    109   CO2 14* 21*   BUN 18 10   CREATININE 1.4 0.9   CALCIUM 10.1 9.8   PROT 8.2  --    BILITOT 0.3  --    ALKPHOS 95  --    ALT 11  --    AST 29  --      Microbiology Results (last 7  days)       Procedure Component Value Units Date/Time    Blood culture [[id number]] Collected: 11/10/22 1023    Order Status: Sent Specimen: Blood from Antecubital, Right Hand Updated: 11/10/22 1023    Blood culture [[id number]] Collected: 11/10/22 1023    Order Status: Sent Specimen: Blood Updated: 11/10/22 1023    Gram stain [[id number]] Collected: 11/09/22 1848    Order Status: Completed Specimen: Bone from Toe, Right Foot Updated: 11/10/22 0323     Gram Stain Result No WBC's      No organisms seen    Culture, Anaerobe [[id number]] Collected: 11/09/22 1848    Order Status: Sent Specimen: Bone from Toe, Right Foot Updated: 11/10/22 0221    Aerobic culture [[id number]] Collected: 11/09/22 1848    Order Status: Sent Specimen: Bone from Toe, Right Foot Updated: 11/10/22 0221    AFB Culture & Smear [[id number]] Collected: 11/09/22 1848    Order Status: Sent Specimen: Bone from Toe, Right Foot Updated: 11/10/22 0221    Fungus culture [[id number]] Collected: 11/09/22 1848    Order Status: Sent Specimen: Bone from Toe, Right Foot Updated: 11/10/22 0221              Significant Imaging: I have reviewed all pertinent imaging results/findings within the past 24 hours.  Imaging Results              X-Ray Foot Complete Right (Final result)  Result time 11/08/22 14:48:12      Final result by Valerio Astorga MD (11/08/22 14:48:12)                   Impression:      1. Second toe fracture as above.      Electronically signed by: Valerio Astorga MD  Date:    11/08/2022  Time:    14:48               Narrative:    EXAMINATION:  XR FOOT COMPLETE 3 VIEW RIGHT    CLINICAL HISTORY:  . Pain in unspecified foot    TECHNIQUE:  AP, lateral, and oblique views of the right foot were performed.    COMPARISON:  None    FINDINGS:  Three views right foot.    There is osteopenia.  There is fracture of the middle phalanx of the 2nd toe.  Fracture plane approaches and may involve the D IP articular surface.  No dislocation.  There is edema about the  site.  There are degenerative changes of the foot.

## 2022-11-10 NOTE — PLAN OF CARE
A&Ox4. [place]. C/o pain to right foot due to surgery, 10/10. PRN pain medications administered as per order, pain decreased. Dressing to right foot is clean, dry, and intact. Right foot elevated as per order. Purewick in place, but pt has hesitancy with voiding. Bladder scanned pt, 622mL was scanned. Straight cath pt as per order, 600mL was emptied from bladder. Family at bedside. Safety maintained.           Problem: Adult Inpatient Plan of Care  Goal: Plan of Care Review  Outcome: Ongoing, Progressing  Goal: Patient-Specific Goal (Individualized)  Outcome: Ongoing, Progressing  Goal: Absence of Hospital-Acquired Illness or Injury  Outcome: Ongoing, Progressing

## 2022-11-10 NOTE — DISCHARGE SUMMARY
"[place] [facility] Surg  Discharge Note  Short Stay    Procedure(s) (LRB):  IRRIGATION AND DEBRIDEMENT, LOWER EXTREMITY, SECOND TOE (Right)      OUTCOME: Patient tolerated treatment/procedure well without complication and is now ready for discharge.    DISPOSITION: Home-Health Care [facility]    FINAL DIAGNOSIS:  Open displaced fracture of middle phalanx of lesser toe of right foot    FOLLOWUP: In clinic    DISCHARGE INSTRUCTIONS:    Discharge Procedure Orders   WALKER FOR HOME USE     Order Specific Question Answer Comments   Type of Walker: Adult (5'4"-6'6")    With wheels? Yes    Height: 5'3''    Weight: 65.8 kg (145 lb)    Length of need (1-99 months): 99    Does patient have medical equipment at home? none    Please check all that apply: Patient's condition impairs ambulation.    Please check all that apply: Patient is unable to safely ambulate without equipment.      Ambulatory referral/consult to Home Health   Standing Status: Future   Referral Priority: Routine Referral Type: Home Health Care   Referral Reason: Specialty Services Required   Requested Specialty: Home Health Services   Number of Visits Requested: 1     Diet Adult Regular     Keep surgical extremity elevated     Notify your health care provider if you experience any of the following:  temperature >100.4     Notify your health care provider if you experience any of the following:  persistent nausea and vomiting or diarrhea     Notify your health care provider if you experience any of the following:  severe uncontrolled pain     Notify your health care provider if you experience any of the following:  redness, tenderness, or signs of infection (pain, swelling, redness, odor or green/yellow discharge around incision site)     Leave dressing on - Keep it clean, dry, and intact until clinic visit   Order Comments: Unless changed per home health.     Weight bearing restrictions (specify):   Order Comments: With surgical shoe and walker as tolerated        TIME " SPENT ON DISCHARGE: 30 minutes

## 2022-11-10 NOTE — PLAN OF CARE
Discharge orders noted. Patient was approved for rolling walker. Rolling walker will be delivered at bedside.  sent Home Health orders to [facility] (via route). Dr. Quintero appointment requested.     Dr. Quintero appointment scheduled. I called and spoke with Keisha at [facility]. They did receive the HH orders and are working on assigning HH company. No further case management needs.    CM met with patient and daughter at bedside. All questions answered.    1612 Sydni with [facility] called and stated patient is set up with [facility]. They will see patient Saturday. I attempted to call patient and daughter Raven but unable to reach.    Patient Contacts    Name Relation Home Work Mobile   Sonia Mehta Sister [phone]     Micaela Harris Daughter [phone]     Raven Olmstead Daughter   [phone]       Future Appointments   Date Time Provider Department Center   11/14/2022  2:00 PM Alfa Quintero DPM [facility]   11/22/2022  2:30 PM Arjun Horn MD [facility] [place] [facility]   12/9/2022 11:00 AM HOME MONITOR DEVICE CHECK, [facility]  DME Provider Home Health Services [phone] [address] [facility] [place] [place] [address]      Next Steps: Follow up  Appointment:   Instructions: You will be contacted with Home Health information.     [facility]  DME Provider [phone] [address] [place] [address]      Next Steps: Follow up  Appointment:   Instructions: [facility] [employer]          11/10/22 1233   Final Note   Assessment Type Final Discharge Note   Anticipated Discharge Disposition Home-Health   Hospital Resources/Appts/Education Provided Appointments scheduled by Navigator/Coordinator   Post-Acute Status   Post-Acute Authorization Home Health   Discharge Delays (!) Other  (Waiting on hospital follow/DME delivery/Home Health)     Tyesha Worley RN    ([facility])  [phone]

## 2022-11-10 NOTE — BRIEF OP NOTE
[place] - Surgery (Hospital)  Brief Operative Note    SUMMARY     Surgery Date: 11/9/2022     Surgeon(s) and Role:     * Alfa Quintero DPM - Primary    Assisting Surgeon: None    Pre-op Diagnosis:  Open fracture dislocation of interphalangeal joint of single toe, right, initial encounter [S92.911B]    Post-op Diagnosis:  Post-Op Diagnosis Codes:     * Open fracture dislocation of interphalangeal joint of single toe, right, initial encounter [S92.911B]    Procedure(s) (LRB):  IRRIGATION AND DEBRIDEMENT, LOWER EXTREMITY, SECOND TOE (Right) to include middle phalangectomy with primary closure of wound     Anesthesia: Local MAC    Operative Findings: exposed non-viable middle phalanx bone. Non-viable skin, soft tissue and bone excised noting normal healthy appearing cartilage to the base of the distal phalanx and head of the proximal phalanx. Bone sent for c&s and pathology and incision site c&s taken.     Estimated Blood Loss: 10 mL    Estimated Blood Loss has been documented.         Specimens:   Specimen (24h ago, onward)      None            [id number]

## 2022-11-10 NOTE — PLAN OF CARE
went to meet with patient. Patient's daughter Raven is at bedside. Patient reports she is independent and lives at home alone. She does have family that can help as needed. She does not use any DME or have home health. Patient still drives, but family will transport home at discharge. We discussed therapy recommendations of home health PT/OT. They are also recommending a shower chair and a rolling walker. Patient is amendable to Home Health, and said she has had it before in the past. She is agreeable to rolling walker. Patient is aware shower chair is a non-covered insurance item. She will purchase on her own elsewhere. She refuses BSC (but not recommended either). Patient refuses PCP follow-up. She will schedule on her own.  will request Dr. Quintero [facility] follow-up from access navigators. Patient encouraged to call with any questions or concerns.  will continue to follow patient through transitions of care and assist with any discharge needs.     Nikole with [facility] notified of rolling walker order. Awaiting approval.    Dr. Quintero will write for Home Health orders at discharge.    Patient Contacts    Name Relation Home Work Mobile   Sonia Mehta Sister [phone]     Micaela Harris Daughter [phone]     Raven Olmstead Daughter   [phone]     Future Appointments   Date Time Provider Department Center   11/22/2022  2:30 PM Arjun Horn MD [facility] [place] [facility]   12/9/2022 11:00 AM HOME MONITOR DEVICE CHECK, [facility]         11/10/22 1133   Discharge Assessment   Assessment Type Discharge Planning Assessment   Confirmed/corrected address, phone number and insurance Yes   Confirmed Demographics Correct on Facesheet   Source of Information patient;family   Lives With alone   Facility Arrived From: Home   Do you expect to return to your current living situation? Yes   Do you have help at home or someone to help you manage your care at home? Yes    Who are your caregiver(s) and their phone number(s)? Raven (Daughter) Phone#[phone]   Prior to hospitilization cognitive status: Alert/Oriented   Current cognitive status: Alert/Oriented   Walking or Climbing Stairs Difficulty none   Dressing/Bathing Difficulty none   Equipment Currently Used at Home none   Do you take prescription medications? Yes   Do you have prescription coverage? Yes   Do you have any problems affording any of your prescribed medications? No   Is the patient taking medications as prescribed? yes   Who is going to help you get home at discharge? Family   How do you get to doctors appointments? car, drives self   Are you on dialysis? No   Do you take coumadin? No   Discharge Plan A Home;Home Health   Discharge Plan B Home with family;Home Health   DME Needed Upon Discharge  shower chair;walker, rolling   Discharge Plan discussed with: Patient;Adult children   Discharge Barriers Identified None   Physical Activity   On average, how many days per week do you engage in moderate to strenuous exercise (like a brisk walk)? Patient refu   On average, how many minutes do you engage in exercise at this level? Patient refu   Financial Resource Strain   How hard is it for you to pay for the very basics like food, housing, medical care, and heating? Not hard   Housing Stability   In the last 12 months, was there a time when you were not able to pay the mortgage or rent on time? N   In the last 12 months, was there a time when you did not have a steady place to sleep or slept in a shelter (including now)? N   Transportation Needs   In the past 12 months, has lack of transportation kept you from medical appointments or from getting medications? no   In the past 12 months, has lack of transportation kept you from meetings, work, or from getting things needed for daily living? No   Food Insecurity   Within the past 12 months, you worried that your food would run out before you got the money to buy more.  Never true   Within the past 12 months, the food you bought just didn't last and you didn't have money to get more. Never true   Stress   Do you feel stress - tense, restless, nervous, or anxious, or unable to sleep at night because your mind is troubled all the time - these days? Patient refu   Social Connections   In a typical week, how many times do you talk on the phone with family, friends, or neighbors? More than 3   How often do you get together with friends or relatives? More than 3   How often do you attend Christianity or Moravian services? Patient refu   Do you belong to any clubs or organizations such as Christianity groups, unions, fraternal or athletic groups, or school groups? Patient refu   How often do you attend meetings of the clubs or organizations you belong to? Patient refu   Are you , , , , never , or living with a partner?    Alcohol Use   Q1: How often do you have a drink containing alcohol? Patient refu   Q2: How many drinks containing alcohol do you have on a typical day when you are drinking? Patient refu   Q3: How often do you have six or more drinks on one occasion? Patient refu     Tyesha Worley RN    [phone]

## 2022-11-10 NOTE — PROGRESS NOTES
11/09/22 1936   Admission   Initial VN Admission Questions Complete   Communication Issues? None   Shift   Virtual Nurse - Patient Verbalized Approval Of Camera Use   Safety/Activity   Patient Rounds visualized patient   Safety Promotion/Fall Prevention instructed to call staff for mobility;Fall Risk reviewed with patient/family   VIRTUAL NURSE: Admission questions completed with patient at this time. Family at bedside. Care plan and safety precautions reviewed. Pt verbalized no complaints, no needs expressed. Instructed to use call light for assistance, she verbalized understanding.

## 2022-11-10 NOTE — OP NOTE
[facility] Surg  Operative Note      Date of Procedure: 11/9/2022     Procedure: Procedure(s) (LRB):  IRRIGATION AND DEBRIDEMENT, LOWER EXTREMITY, SECOND TOE (Right) with middle phalangectomy    Surgeon(s) and Role:     * Alfa Quintero DPM - Primary     * Shahab Quiñones DPM PGY 2 - Assisting Surgeon    Pre-Operative Diagnosis: Open fracture dislocation of interphalangeal joint of single toe, right, initial encounter [S92.911B]    Post-Operative Diagnosis: Post-Op Diagnosis Codes:     * Open fracture dislocation of interphalangeal joint of single toe, right, initial encounter [S92.911B]    Anesthesia: Local MAC    Operative Findings (including complications, if any): Exposed non-viable middle phalanx bone. Non-viable skin, soft tissue and bone excised noting normal healthy appearing cartilage to the base of the distal phalanx and head of the proximal phalanx. Bone sent for c&s and pathology and incision site c&s taken.     Description of Technical Procedures:     The patient was brought to the operating room on a stretcher and was transferred to the OR table in supine position. Anesthesia was induced.  A tourniquet was applied to the right ankle. 10 mL of a 1:1 mixture of 0.50% bupivacaine plain and 1% lidocaine plain was locally infiltrated. The right lower limb was prepped and draped in a sterile manner. Tourniquet(s) inflated to 250 mmHg.     Attention was directed to the right 2nd toe intermediate phalanx where bone was exposed through a wound with devitalized surrounding skin that was macerated and overlying fibrous/eschar tissue. Exposed bone and devitalized tissue to include fibrous/eschar tissue debrided with rongeur exposing the wound borders.  The peripheral margins of the wound to include skin, subcutaneous tissue and fascia were excised using a 15 blade. The intermediate phalanx was isolated from the soft tissues and tendinous attachment with sharp dissection via 15 blade and freer blunt  dissection. The bone was removed from the toe, with a residual piece of bone fragment in the soft tissue distally, which was freed and removed from the field.  The head of the proximal phalanx and base of the distal phalanx were inspected and noted to be healthy and viable.  Specimens of the intermediate phalanx were collected and sent for C&S and HIST analysis. Copiously 1.5 liter normal saline irrigation was preformed. At this point all remaining tissues and bone appeared healthy, viable, and without signs of contamination/infection. The extensor tendon was re approximated with 4-0 vicryl suture, subcuticular closure with the same suture followed, and lastly superficial skin was closure with 4-0 nylon horizontal mattress sutures.     Of note, the base of the proximal phalanx appeared to be enlarged secondary to extensive OA, was protruding and causing slight dorsal deviation of the toe prior to surgery.     The surgical site was irrigated with normal saline solution via bulb syringe.  The surgical site was dressed with xeroform, 4x4 gauze, cast padding, and ACE wrap. Tourniquet(s) deflated.       The patient was transferred to the recovery room with vital signs stable. Following a period of post op monitoring, the patient will be transferred to the floor with the following postop instructions:   1. Keep dressing clean, dry, and intact until next seen by podiatry.   2. Weightbearing status: partial weightbearing.   3. All necessary prescriptions were ordered and medical management will continue.   4. Contact podiatry with any postop questions or concerns.     Estimated Blood Loss (EBL): 10 mL           Implants: * No implants in log *    Specimens:   Specimen (24h ago, onward)       Start     Ordered    11/09/22 6221  Specimen to Pathology, Surgery Other  Once        Comments: Pre-op Diagnosis: Open fracture dislocation of interphalangeal joint of single toe, right, initial encounter  [S58.073Q]Procedure(s):IRRIGATION AND DEBRIDEMENT, LOWER EXTREMITY, SECOND TOE Number of specimens: 1Name of specimens: 1 - Phalanx  bone right second toe, PERM     References:    Click here for ordering Quick Tip   Question Answer Comment   Procedure Type: Other    Specimen Class: Routine/Screening    Which provider would you like to cc? ALFA DIAZ    Release to patient Immediate        11/09/22 1351                            Condition: Good    Disposition: PACU - hemodynamically stable.    Attestation: I was present and scrubbed for the entire procedure.    I was present and scrubbed for the entire care.  Alfa Diaz DPM, FACFAS

## 2022-11-10 NOTE — TRANSFER OF CARE
Anesthesia Transfer of Care Note    Patient: Autumn Harris    Procedure(s) Performed: Procedure(s) (LRB):  IRRIGATION AND DEBRIDEMENT, LOWER EXTREMITY, SECOND TOE (Right)    Patient location: GI    Anesthesia Type: MAC    Transport from OR: Transported from OR on room air with adequate spontaneous ventilation    Post pain: adequate analgesia    Post assessment: no apparent anesthetic complications and tolerated procedure well    Post vital signs: stable    Level of consciousness: awake, alert and oriented    Nausea/Vomiting: no nausea/vomiting    Complications: none    Transfer of care protocol was followed      Last vitals:   Visit Vitals  BP (!) 170/86   Pulse 80   Temp 36.7 °C (98 °F) (Oral)   Resp 18   Wt 65.8 kg (145 lb)   LMP  (LMP Unknown)   SpO2 95%   BMI 25.69 kg/m²

## 2022-11-11 ENCOUNTER — PATIENT OUTREACH (OUTPATIENT)
Dept: ADMINISTRATIVE | Facility: OTHER | Age: 77
End: 2022-11-11
Payer: MEDICARE

## 2022-11-11 NOTE — PROGRESS NOTES
IP Liaison - Final Visit Note    Patient: Autumn Harris  MRN:  [id number]  Date of Service:  11/11/2022  Completed by:  KAYLIE Rouse    Reason for Visit   Patient presents with    IP Liaison Chart Review     Patient discharged from hospital before NICKIEW was able to complete follow-up visit.         Patient Summary     Discharge Date: 11/11/2022  Discharge telephone number/address: [phone] / [address] [place] [address]  Follow up provider: Alfa Quintero DPM  Follow up appointments: 11/14/2022 @ 2:00pm  Home Health agency & telephone number: [facility]  DME ordered &  name: RW delivered to pt bedside  Assigned OPCM RN/SW: n/a  Report sent to follow up team (PCP/OPCM) via in basket message: n/a  Community Resources arranged including agency name & contact info: n/a      KAYLIE Rouse

## 2022-11-12 LAB — BACTERIA SPEC AEROBE CULT: ABNORMAL

## 2022-11-12 PROCEDURE — G0180 PR HOME HEALTH MD CERTIFICATION: ICD-10-PCS | Mod: ,,, | Performed by: PODIATRIST

## 2022-11-12 PROCEDURE — G0180 MD CERTIFICATION HHA PATIENT: HCPCS | Mod: ,,, | Performed by: PODIATRIST

## 2022-11-14 ENCOUNTER — OFFICE VISIT (OUTPATIENT)
Dept: PODIATRY | Facility: CLINIC | Age: 77
End: 2022-11-14
Payer: MEDICARE

## 2022-11-14 VITALS
BODY MASS INDEX: 25.69 KG/M2 | HEART RATE: 78 BPM | HEIGHT: 63 IN | DIASTOLIC BLOOD PRESSURE: 94 MMHG | SYSTOLIC BLOOD PRESSURE: 135 MMHG

## 2022-11-14 DIAGNOSIS — Z98.890 POSTOPERATIVE STATE: ICD-10-CM

## 2022-11-14 DIAGNOSIS — S92.501D: Primary | ICD-10-CM

## 2022-11-14 DIAGNOSIS — R26.81 GAIT INSTABILITY: ICD-10-CM

## 2022-11-14 LAB — BACTERIA SPEC ANAEROBE CULT: NORMAL

## 2022-11-14 PROCEDURE — 1126F PR PAIN SEVERITY QUANTIFIED, NO PAIN PRESENT: ICD-10-PCS | Mod: CPTII,S$GLB,, | Performed by: PODIATRIST

## 2022-11-14 PROCEDURE — 99999 PR PBB SHADOW E&M-EST. PATIENT-LVL III: ICD-10-PCS | Mod: PBBFAC,,, | Performed by: PODIATRIST

## 2022-11-14 PROCEDURE — 3080F DIAST BP >= 90 MM HG: CPT | Mod: CPTII,S$GLB,, | Performed by: PODIATRIST

## 2022-11-14 PROCEDURE — 1160F RVW MEDS BY RX/DR IN RCRD: CPT | Mod: CPTII,S$GLB,, | Performed by: PODIATRIST

## 2022-11-14 PROCEDURE — 99024 PR POST-OP FOLLOW-UP VISIT: ICD-10-PCS | Mod: S$GLB,,, | Performed by: PODIATRIST

## 2022-11-14 PROCEDURE — 1159F MED LIST DOCD IN RCRD: CPT | Mod: CPTII,S$GLB,, | Performed by: PODIATRIST

## 2022-11-14 PROCEDURE — 99999 PR PBB SHADOW E&M-EST. PATIENT-LVL III: CPT | Mod: PBBFAC,,, | Performed by: PODIATRIST

## 2022-11-14 PROCEDURE — 99024 POSTOP FOLLOW-UP VISIT: CPT | Mod: S$GLB,,, | Performed by: PODIATRIST

## 2022-11-14 PROCEDURE — 1159F PR MEDICATION LIST DOCUMENTED IN MEDICAL RECORD: ICD-10-PCS | Mod: CPTII,S$GLB,, | Performed by: PODIATRIST

## 2022-11-14 PROCEDURE — 1126F AMNT PAIN NOTED NONE PRSNT: CPT | Mod: CPTII,S$GLB,, | Performed by: PODIATRIST

## 2022-11-14 PROCEDURE — 3075F PR MOST RECENT SYSTOLIC BLOOD PRESS GE 130-139MM HG: ICD-10-PCS | Mod: CPTII,S$GLB,, | Performed by: PODIATRIST

## 2022-11-14 PROCEDURE — 3080F PR MOST RECENT DIASTOLIC BLOOD PRESSURE >= 90 MM HG: ICD-10-PCS | Mod: CPTII,S$GLB,, | Performed by: PODIATRIST

## 2022-11-14 PROCEDURE — 3075F SYST BP GE 130 - 139MM HG: CPT | Mod: CPTII,S$GLB,, | Performed by: PODIATRIST

## 2022-11-14 PROCEDURE — 1160F PR REVIEW ALL MEDS BY PRESCRIBER/CLIN PHARMACIST DOCUMENTED: ICD-10-PCS | Mod: CPTII,S$GLB,, | Performed by: PODIATRIST

## 2022-11-14 NOTE — PROGRESS NOTES
"Subjective:      Patient ID: Autumn Harris is a 77 y.o. female.    Chief Complaint: Follow-up (Right foot )    Status post middle phalangectomy right 2nd toe for open fracture of the middle phalanx on 11/09/2022.  Relates intermittent aching pain to the right toe.  She is getting nausea from taking oral antibiotics which consisted ciprofloxacin doxycycline.  Intraop C&S grew 0SSA.  Dressing has remained clean, dry intact since discharge.  She is receiving home health for PT/OT.  She states concern over using the rolling walker and is requesting a cane for balance and support.    Vitals:    11/14/22 1422   BP: (!) 135/94   Pulse: 78   Height: 5' 3" (1.6 m)   PainSc: 0-No pain   PainLoc: Foot      Past Medical History:   Diagnosis Date    Anxiety     CKD (chronic kidney disease) stage 3, GFR 30-59 ml/min     COPD (chronic obstructive pulmonary disease)     Depression     Encounter for blood transfusion     Fibromyalgia     Hematuria     High cholesterol     Hypertension     Oral cancer     Proteinuria     Sore throat 7/3/2019    Urinary tract infection     Vitamin D deficiency        Past Surgical History:   Procedure Laterality Date    ABDOMINAL SURGERY      [place]    HYSTERECTOMY      INSERTION OF IMPLANTABLE LOOP RECORDER N/A 10/15/2021    Procedure: Insertion, Implantable Loop Recorder;  Surgeon: Cristhian Alvares MD;  Location: [facility] CATH LAB/EP;  Service: Cardiology;  Laterality: N/A;    IRRIGATION AND DEBRIDEMENT OF LOWER EXTREMITY Right 11/9/2022    Procedure: IRRIGATION AND DEBRIDEMENT, LOWER EXTREMITY, SECOND TOE;  Surgeon: Alfa Quintero DPM;  Location: [facility] OR;  Service: Podiatry;  Laterality: Right;  micro sagittal saw, vancomycin powder    OOPHORECTOMY      only one removed    Stomach Ulcer Surgery         Family History   Adopted: Yes   Problem Relation Age of Onset    Heart disease Mother     Pacemaker/defibrilator Brother     Heart disease Brother     Heart disease Maternal Aunt     Lung cancer " Brother        Social History     Socioeconomic History    Marital status:    Tobacco Use    Smoking status: Former     Packs/day: 1.50     Years: 45.00     Pack years: 67.50     Types: Cigarettes     Quit date: 1995     Years since quittin.3    Smokeless tobacco: Never    Tobacco comments:     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   Substance and Sexual Activity    Alcohol use: No    Drug use: Never     Social Determinants of Health     Financial Resource Strain: Low Risk     Difficulty of Paying Living Expenses: Not hard at all   Food Insecurity: No Food Insecurity    Worried About Running Out of Food in the Last Year: Never true    Ran Out of Food in the Last Year: Never true   Transportation Needs: No Transportation Needs    Lack of Transportation (Medical): No    Lack of Transportation (Non-Medical): No   Physical Activity: Unknown    Days of Exercise per Week: Patient refused    Minutes of Exercise per Session: Patient refused   Stress: Unknown    Feeling of Stress : Patient refused   Social Connections: Unknown    Frequency of Communication with Friends and Family: More than three times a week    Frequency of Social Gatherings with Friends and Family: More than three times a week    Attends Alevism Services: Patient refused    Active Member of Clubs or Organizations: Patient refused    Attends Club or Organization Meetings: Patient refused    Marital Status:    Housing Stability: Low Risk     Unable to Pay for Housing in the Last Year: No    Number of Places Lived in the Last Year: 1    Unstable Housing in the Last Year: No       Current Outpatient Medications   Medication Sig Dispense Refill    albuterol (VENTOLIN HFA) 90 mcg/actuation inhaler Inhale 2 puffs into the lungs every 6 (six) hours as needed for Wheezing. Rescue 18 g 0    azelastine (ASTELIN) 137 mcg (0.1 %) nasal spray 2 sprays by Nasal route 2 (two) times daily.  5    blood sugar diagnostic Strp 1 strip by  Misc.(Non-Drug; Combo Route) route 3 (three) times daily as needed. 30 each 3    blood-glucose meter kit Use as instructed 1 each 0    busPIRone (BUSPAR) 5 MG Tab Take 5 mg by mouth 2 (two) times daily.      ciprofloxacin HCl (CIPRO) 500 MG tablet Take 1 tablet (500 mg total) by mouth every 12 (twelve) hours. 20 tablet 0    clorazepate (TRANXENE) 3.75 MG Tab Take 7.5 mg by mouth 2 (two) times daily. 2 tablets      doxycycline (VIBRA-TABS) 100 MG tablet Take 1 tablet (100 mg total) by mouth 2 (two) times daily. 20 tablet 0    ergocalciferol (ERGOCALCIFEROL) 50,000 unit Cap Take 50,000 Units by mouth every Monday.      fluticasone (FLONASE) 50 mcg/actuation nasal spray 2 sprays by Each Nostril route Daily.  0    HYDROcodone-acetaminophen (NORCO)  mg per tablet Take 1 tablet by mouth every 4 (four) hours as needed for Pain. 30 tablet 0    levocetirizine (XYZAL) 5 MG tablet Take 1 tablet (5 mg total) by mouth every evening. 30 tablet 11    montelukast (SINGULAIR) 10 mg tablet Take 10 mg by mouth once daily.  11    pantoprazole (PROTONIX) 40 MG tablet TAKE 1 TABLET BY MOUTH EVERY DAY (Patient taking differently: Take 40 mg by mouth once daily.) 90 tablet 3    triamcinolone acetonide 0.1% (KENALOG) 0.1 % cream Apply topically 2 (two) times daily. Apply to rash on on hand and ankle as needed       No current facility-administered medications for this visit.       Review of patient's allergies indicates:   Allergen Reactions    Pcn [penicillins]     Sulfa (sulfonamide antibiotics)          Review of Systems   Constitutional: Negative for chills, fever and malaise/fatigue.   HENT:  Negative for congestion.    Cardiovascular:  Negative for chest pain and claudication.   Respiratory:  Negative for cough and shortness of breath.    Skin:  Positive for color change.   Musculoskeletal:  Positive for muscle weakness.   Gastrointestinal:  Negative for nausea and vomiting.   Neurological:  Negative for numbness and  paresthesias.         Objective:      Physical Exam  Constitutional:       General: She is not in acute distress.     Appearance: She is not ill-appearing.   Cardiovascular:      Pulses:           Dorsalis pedis pulses are 2+ on the right side.        Posterior tibial pulses are 2+ on the right side.   Musculoskeletal:      Comments: Rectus appearance the right 2nd toe with reduced edema.  Mild localized pain on palpation to the right 2nd toe.  No palpable fluctuance or crepitance.   Skin:     General: Skin is warm.      Capillary Refill: Capillary refill takes less than 2 seconds.      Findings: Erythema present.      Nails: There is no clubbing.      Comments: Transverse incision overlying the mid aspect of the right 2nd toe well-approximated sutures.  There is no gapping or drainage noted.  Resolving mild erythema to the right hallux.   Neurological:      Mental Status: She is alert.             Assessment:       Encounter Diagnoses   Name Primary?    Open fracture of phalanx of lesser toe of right foot with routine healing, subsequent encounter Yes    Postoperative state     Gait instability          Plan:       Autumn was seen today for follow-up.    Diagnoses and all orders for this visit:    Open fracture of phalanx of lesser toe of right foot with routine healing, subsequent encounter  -     CANE FOR HOME USE    Postoperative state  -     CANE FOR HOME USE    Gait instability  -     CANE FOR HOME USE      I counseled the patient on her conditions, their implications and medical management.    Dressing right foot removed.  Right foot cleansed Hibiclens scrub.  Applied Betadine to incision followed by small Mepilex border.  Home care instructions reviewed in detail.  Patient may cleanse the toe in the shower however avoid soaking the area.  Pat dry and apply Betadine followed by a Band-Aid to protect incision.  She may apply loose fitting sock to the right foot.  Continue using Darco shoe as needed.       Continue rest and elevation.    Discontinue ciprofloxacin continue p.o. doxycycline twice daily.  Recommend probiotic daily.      RTC within 2 weeks or p.r.n. as discussed for suture removal.  Prescribed cane with narrow for quad base to help with stability and support during gait.    A portion of this note was generated by voice recognition software and may contain spelling and grammar errors.      .

## 2022-11-14 NOTE — PATIENT INSTRUCTIONS
Discontinue ciprofloxacin and continue taking doxycycline as prescribed.    Continue keeping foot elevated at rest.    You may shower and get the right foot wet in the shower but do not soak the toe. Pat the incision dry and apply betadine to the incision and cover with band aid daily. You may wear a loose fitting sock that does not pull on the toe.

## 2022-11-15 LAB
BACTERIA BLD CULT: NORMAL
BACTERIA BLD CULT: NORMAL

## 2022-11-17 LAB
FINAL PATHOLOGIC DIAGNOSIS: NORMAL
GROSS: NORMAL
Lab: NORMAL

## 2022-11-18 NOTE — PHYSICIAN QUERY
PT Name: Autumn Harris  MR #: [id number]    DOCUMENTATION CLARIFICATION     CDS/: Oh Kiran RN, BSN   Contact information: [email or web address]   This form is a permanent document in the medical record.     Query Date: November 18, 2022    By submitting this query, we are merely seeking further clarification of documentation.  Please utilize your independent clinical judgment when addressing the question(s) below.    The medical record contains the following:  Pathology Findings Location in Medical Record   Final Pathological Diagnosis  BONE FROM RIGHT 2ND TOE:   ACUTE OSTEOMYELITIS Lab results 11/9/22       Please clarify the pathology findings:    [  x] Pathology findings noted above are ruled in/confirmed as diagnoses     [  ] Pathology findings noted above are not confirmed as diagnoses     [  ] Pathology findings noted above are incidental     [  ] Other diagnosis (please specify): ___________       Please document in your progress notes daily for the duration of treatment until resolved and include in your discharge summary.    Form No. [id number]

## 2022-11-22 ENCOUNTER — OFFICE VISIT (OUTPATIENT)
Dept: CARDIOLOGY | Facility: CLINIC | Age: 77
End: 2022-11-22
Payer: MEDICARE

## 2022-11-22 VITALS
DIASTOLIC BLOOD PRESSURE: 80 MMHG | HEIGHT: 63 IN | BODY MASS INDEX: 24.42 KG/M2 | SYSTOLIC BLOOD PRESSURE: 134 MMHG | WEIGHT: 137.81 LBS | HEART RATE: 68 BPM

## 2022-11-22 DIAGNOSIS — R94.31 NONSPECIFIC ABNORMAL ELECTROCARDIOGRAM (ECG) (EKG): ICD-10-CM

## 2022-11-22 DIAGNOSIS — E87.1 HYPONATREMIA: ICD-10-CM

## 2022-11-22 DIAGNOSIS — I95.2 HYPOTENSION DUE TO DRUGS: ICD-10-CM

## 2022-11-22 DIAGNOSIS — I35.1 AORTIC VALVE INSUFFICIENCY, ETIOLOGY OF CARDIAC VALVE DISEASE UNSPECIFIED: Primary | ICD-10-CM

## 2022-11-22 DIAGNOSIS — I49.1 PREMATURE ATRIAL CONTRACTIONS: ICD-10-CM

## 2022-11-22 DIAGNOSIS — I10 ESSENTIAL HYPERTENSION: ICD-10-CM

## 2022-11-22 PROCEDURE — 1111F DSCHRG MED/CURRENT MED MERGE: CPT | Mod: CPTII,S$GLB,, | Performed by: INTERNAL MEDICINE

## 2022-11-22 PROCEDURE — 99213 OFFICE O/P EST LOW 20 MIN: CPT | Mod: S$GLB,,, | Performed by: INTERNAL MEDICINE

## 2022-11-22 PROCEDURE — 1101F PR PT FALLS ASSESS DOC 0-1 FALLS W/OUT INJ PAST YR: ICD-10-PCS | Mod: CPTII,S$GLB,, | Performed by: INTERNAL MEDICINE

## 2022-11-22 PROCEDURE — 3075F PR MOST RECENT SYSTOLIC BLOOD PRESS GE 130-139MM HG: ICD-10-PCS | Mod: CPTII,S$GLB,, | Performed by: INTERNAL MEDICINE

## 2022-11-22 PROCEDURE — 1160F PR REVIEW ALL MEDS BY PRESCRIBER/CLIN PHARMACIST DOCUMENTED: ICD-10-PCS | Mod: CPTII,S$GLB,, | Performed by: INTERNAL MEDICINE

## 2022-11-22 PROCEDURE — 3075F SYST BP GE 130 - 139MM HG: CPT | Mod: CPTII,S$GLB,, | Performed by: INTERNAL MEDICINE

## 2022-11-22 PROCEDURE — 3288F PR FALLS RISK ASSESSMENT DOCUMENTED: ICD-10-PCS | Mod: CPTII,S$GLB,, | Performed by: INTERNAL MEDICINE

## 2022-11-22 PROCEDURE — 99213 PR OFFICE/OUTPT VISIT, EST, LEVL III, 20-29 MIN: ICD-10-PCS | Mod: S$GLB,,, | Performed by: INTERNAL MEDICINE

## 2022-11-22 PROCEDURE — 1125F AMNT PAIN NOTED PAIN PRSNT: CPT | Mod: CPTII,S$GLB,, | Performed by: INTERNAL MEDICINE

## 2022-11-22 PROCEDURE — 1101F PT FALLS ASSESS-DOCD LE1/YR: CPT | Mod: CPTII,S$GLB,, | Performed by: INTERNAL MEDICINE

## 2022-11-22 PROCEDURE — 99999 PR PBB SHADOW E&M-EST. PATIENT-LVL III: ICD-10-PCS | Mod: PBBFAC,,, | Performed by: INTERNAL MEDICINE

## 2022-11-22 PROCEDURE — 1160F RVW MEDS BY RX/DR IN RCRD: CPT | Mod: CPTII,S$GLB,, | Performed by: INTERNAL MEDICINE

## 2022-11-22 PROCEDURE — 1159F PR MEDICATION LIST DOCUMENTED IN MEDICAL RECORD: ICD-10-PCS | Mod: CPTII,S$GLB,, | Performed by: INTERNAL MEDICINE

## 2022-11-22 PROCEDURE — 1159F MED LIST DOCD IN RCRD: CPT | Mod: CPTII,S$GLB,, | Performed by: INTERNAL MEDICINE

## 2022-11-22 PROCEDURE — 93000 EKG 12-LEAD: ICD-10-PCS | Mod: S$GLB,,, | Performed by: INTERNAL MEDICINE

## 2022-11-22 PROCEDURE — 3079F DIAST BP 80-89 MM HG: CPT | Mod: CPTII,S$GLB,, | Performed by: INTERNAL MEDICINE

## 2022-11-22 PROCEDURE — 1111F PR DISCHARGE MEDS RECONCILED W/ CURRENT OUTPATIENT MED LIST: ICD-10-PCS | Mod: CPTII,S$GLB,, | Performed by: INTERNAL MEDICINE

## 2022-11-22 PROCEDURE — 93000 ELECTROCARDIOGRAM COMPLETE: CPT | Mod: S$GLB,,, | Performed by: INTERNAL MEDICINE

## 2022-11-22 PROCEDURE — 99999 PR PBB SHADOW E&M-EST. PATIENT-LVL III: CPT | Mod: PBBFAC,,, | Performed by: INTERNAL MEDICINE

## 2022-11-22 PROCEDURE — 1125F PR PAIN SEVERITY QUANTIFIED, PAIN PRESENT: ICD-10-PCS | Mod: CPTII,S$GLB,, | Performed by: INTERNAL MEDICINE

## 2022-11-22 PROCEDURE — 3079F PR MOST RECENT DIASTOLIC BLOOD PRESSURE 80-89 MM HG: ICD-10-PCS | Mod: CPTII,S$GLB,, | Performed by: INTERNAL MEDICINE

## 2022-11-22 PROCEDURE — 3288F FALL RISK ASSESSMENT DOCD: CPT | Mod: CPTII,S$GLB,, | Performed by: INTERNAL MEDICINE

## 2022-11-22 RX ORDER — ACARBOSE 25 MG/1
25 TABLET ORAL 3 TIMES DAILY
Status: ON HOLD | COMMUNITY
Start: 2022-11-11 | End: 2024-03-22 | Stop reason: HOSPADM

## 2022-11-22 NOTE — PROGRESS NOTES
Subjective:      Patient ID: Autumn Harris is a 77 y.o. female.    Chief Complaint: Follow-up    HPI:  Tripped on rug and fractured right first toe requiring ORIF.    BP WNL at home off all antihypertensives.  Pt has prn meds to take if BP goes up.    Review of Systems   Cardiovascular:  Positive for dyspnea on exertion (mild chronic). Negative for chest pain, claudication, irregular heartbeat, leg swelling, near-syncope, orthopnea, palpitations and syncope.          No symptomatic hypoglycemia since last Thanksgiving.        Past Medical History:   Diagnosis Date    Anxiety     CKD (chronic kidney disease) stage 3, GFR 30-59 ml/min     COPD (chronic obstructive pulmonary disease)     Depression     Encounter for blood transfusion     Fibromyalgia     Hematuria     High cholesterol     Hypertension     Oral cancer     Proteinuria     Sore throat 7/3/2019    Urinary tract infection     Vitamin D deficiency         Past Surgical History:   Procedure Laterality Date    ABDOMINAL SURGERY      [facility]    HYSTERECTOMY      INSERTION OF IMPLANTABLE LOOP RECORDER N/A 10/15/2021    Procedure: Insertion, Implantable Loop Recorder;  Surgeon: Cristhian Alvares MD;  Location: [facility] CATH LAB/EP;  Service: Cardiology;  Laterality: N/A;    IRRIGATION AND DEBRIDEMENT OF LOWER EXTREMITY Right 11/9/2022    Procedure: IRRIGATION AND DEBRIDEMENT, LOWER EXTREMITY, SECOND TOE;  Surgeon: Alfa Quintero DPM;  Location: [facility] OR;  Service: Podiatry;  Laterality: Right;  micro sagittal saw, vancomycin powder    OOPHORECTOMY      only one removed    Stomach Ulcer Surgery         Family History   Adopted: Yes   Problem Relation Age of Onset    Heart disease Mother     Pacemaker/defibrilator Brother     Heart disease Brother     Heart disease Maternal Aunt     Lung cancer Brother        Social History     Socioeconomic History    Marital status:    Tobacco Use    Smoking status: Former     Packs/day: 1.50      Years: 45.00     Pack years: 67.50     Types: Cigarettes     Quit date: 1995     Years since quittin.3    Smokeless tobacco: Never    Tobacco comments:     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   Substance and Sexual Activity    Alcohol use: No    Drug use: Never     Social Determinants of Health     Financial Resource Strain: Low Risk     Difficulty of Paying Living Expenses: Not hard at all   Food Insecurity: No Food Insecurity    Worried About Running Out of Food in the Last Year: Never true    Ran Out of Food in the Last Year: Never true   Transportation Needs: No Transportation Needs    Lack of Transportation (Medical): No    Lack of Transportation (Non-Medical): No   Physical Activity: Unknown    Days of Exercise per Week: Patient refused    Minutes of Exercise per Session: Patient refused   Stress: Unknown    Feeling of Stress : Patient refused   Social Connections: Unknown    Frequency of Communication with Friends and Family: More than three times a week    Frequency of Social Gatherings with Friends and Family: More than three times a week    Attends Restorationist Services: Patient refused    Active Member of Clubs or Organizations: Patient refused    Attends Club or Organization Meetings: Patient refused    Marital Status:    Housing Stability: Low Risk     Unable to Pay for Housing in the Last Year: No    Number of Places Lived in the Last Year: 1    Unstable Housing in the Last Year: No       Current Outpatient Medications on File Prior to Visit   Medication Sig Dispense Refill    albuterol (VENTOLIN HFA) 90 mcg/actuation inhaler Inhale 2 puffs into the lungs every 6 (six) hours as needed for Wheezing. Rescue 18 g 0    azelastine (ASTELIN) 137 mcg (0.1 %) nasal spray 2 sprays by Nasal route 2 (two) times daily.  5    blood sugar diagnostic Strp 1 strip by Misc.(Non-Drug; Combo Route) route 3 (three) times daily as needed. 30 each 3    blood-glucose meter  "kit Use as instructed 1 each 0    busPIRone (BUSPAR) 5 MG Tab Take 5 mg by mouth 2 (two) times daily.      clorazepate (TRANXENE) 3.75 MG Tab Take 7.5 mg by mouth 2 (two) times daily. 2 tablets      doxycycline (VIBRA-TABS) 100 MG tablet Take 1 tablet (100 mg total) by mouth 2 (two) times daily. 20 tablet 0    ergocalciferol (ERGOCALCIFEROL) 50,000 unit Cap Take 50,000 Units by mouth every Monday.      fluticasone (FLONASE) 50 mcg/actuation nasal spray 2 sprays by Each Nostril route Daily.  0    HYDROcodone-acetaminophen (NORCO)  mg per tablet Take 1 tablet by mouth every 4 (four) hours as needed for Pain. 30 tablet 0    levocetirizine (XYZAL) 5 MG tablet Take 1 tablet (5 mg total) by mouth every evening. 30 tablet 11    montelukast (SINGULAIR) 10 mg tablet Take 10 mg by mouth once daily.  11    pantoprazole (PROTONIX) 40 MG tablet TAKE 1 TABLET BY MOUTH EVERY DAY (Patient taking differently: Take 40 mg by mouth once daily.) 90 tablet 3    triamcinolone acetonide 0.1% (KENALOG) 0.1 % cream Apply topically 2 (two) times daily. Apply to rash on on hand and ankle as needed      acarbose (PRECOSE) 25 MG Tab Take 25 mg by mouth 3 (three) times daily.      [DISCONTINUED] ciprofloxacin HCl (CIPRO) 500 MG tablet Take 1 tablet (500 mg total) by mouth every 12 (twelve) hours. (Patient not taking: Reported on 11/22/2022) 20 tablet 0     No current facility-administered medications on file prior to visit.       Review of patient's allergies indicates:   Allergen Reactions    Pcn [penicillins]     Sulfa (sulfonamide antibiotics)      Objective:     Vitals:    11/22/22 1519 11/22/22 1538   BP: (!) 142/90 134/80   BP Location: Right arm Left arm   Patient Position: Sitting Sitting   BP Method: Medium (Automatic)    Pulse: 68    Weight: 62.5 kg (137 lb 12.6 oz)    Height: 5' 3" (1.6 m)         Physical Exam  Vitals reviewed.   Constitutional:       Appearance: She is well-developed.   Eyes:      General: No " scleral icterus.  Neck:      Vascular: No carotid bruit or JVD.   Cardiovascular:      Rate and Rhythm: Normal rate and regular rhythm.      Heart sounds: No murmur heard.    No gallop.   Pulmonary:      Breath sounds: Normal breath sounds.   Musculoskeletal:      Right lower leg: No edema.      Left lower leg: No edema.   Skin:     General: Skin is warm and dry.   Neurological:      Mental Status: She is alert and oriented to person, place, and time.   Psychiatric:         Behavior: Behavior normal.         Thought Content: Thought content normal.         Judgment: Judgment normal.      ECG today reviewed by me: NSR, anteroseptal infarct, unchanged    Admission on 11/08/2022, Discharged on 11/10/2022   Component Date Value Ref Range Status    Sodium 11/08/2022 135 (L)  136 - 145 mmol/L Final    Potassium 11/08/2022 4.8  3.5 - 5.1 mmol/L Final    Chloride 11/08/2022 110  95 - 110 mmol/L Final    CO2 11/08/2022 14 (L)  23 - 29 mmol/L Final    Glucose 11/08/2022 126 (H)  70 - 110 mg/dL Final    BUN 11/08/2022 18  8 - 23 mg/dL Final    Creatinine 11/08/2022 1.4  0.5 - 1.4 mg/dL Final    Calcium 11/08/2022 10.1  8.7 - 10.5 mg/dL Final    Total Protein 11/08/2022 8.2  6.0 - 8.4 g/dL Final    Albumin 11/08/2022 4.0  3.5 - 5.2 g/dL Final    Total Bilirubin 11/08/2022 0.3  0.1 - 1.0 mg/dL Final    Alkaline Phosphatase 11/08/2022 95  55 - 135 U/L Final    AST 11/08/2022 29  10 - 40 U/L Final    ALT 11/08/2022 11  10 - 44 U/L Final    Anion Gap 11/08/2022 11  8 - 16 mmol/L Final    eGFR 11/08/2022 39 (A)  >60 mL/min/1.73 m^2 Final    WBC 11/08/2022 7.40  3.90 - 12.70 K/uL Final    RBC 11/08/2022 4.71  4.00 - 5.40 M/uL Final    Hemoglobin 11/08/2022 11.8 (L)  12.0 - 16.0 g/dL Final    Hematocrit 11/08/2022 38.4  37.0 - 48.5 % Final    MCV 11/08/2022 82  82 - 98 fL Final    MCH 11/08/2022 25.1 (L)  27.0 - 31.0 pg Final    MCHC 11/08/2022 30.7 (L)  32.0 - 36.0 g/dL Final    RDW 11/08/2022 15.9 (H)  11.5 -  14.5 % Final    Platelets 11/08/2022 287  150 - 450 K/uL Final    MPV 11/08/2022 10.7  9.2 - 12.9 fL Final    Immature Granulocytes 11/08/2022 0.1  0.0 - 0.5 % Final    Gran # (ANC) 11/08/2022 4.8  1.8 - 7.7 K/uL Final    Immature Grans (Abs) 11/08/2022 0.01  0.00 - 0.04 K/uL Final    Lymph # 11/08/2022 1.6  1.0 - 4.8 K/uL Final    Mono # 11/08/2022 0.7  0.3 - 1.0 K/uL Final    Eos # 11/08/2022 0.3  0.0 - 0.5 K/uL Final    Baso # 11/08/2022 0.12  0.00 - 0.20 K/uL Final    nRBC 11/08/2022 0  0 /100 WBC Final    Gran % 11/08/2022 64.2  38.0 - 73.0 % Final    Lymph % 11/08/2022 21.4  18.0 - 48.0 % Final    Mono % 11/08/2022 9.1  4.0 - 15.0 % Final    Eosinophil % 11/08/2022 3.6  0.0 - 8.0 % Final    Basophil % 11/08/2022 1.6  0.0 - 1.9 % Final    Differential Method 11/08/2022 Automated   Final    CRP 11/08/2022 12.9 (H)  0.0 - 8.2 mg/L Final    Sed Rate 11/08/2022 49 (H)  0 - 36 mm/Hr Final    Anaerobic Culture 11/09/2022 No anaerobes isolated   Final    Aerobic Bacterial Culture 11/09/2022  (A)   Final                    Value:STAPHYLOCOCCUS AUREUS  Moderate      AFB Culture & Smear 11/09/2022 Culture in progress   Preliminary    AFB CULTURE STAIN 11/09/2022 No acid fast bacilli seen.   Final    Fungus (Mycology) Culture 11/09/2022 Culture in progress   Preliminary    Gram Stain Result 11/09/2022 No WBC's   Final    Gram Stain Result 11/09/2022 No organisms seen   Final    Final Pathologic Diagnosis 11/09/2022    Final                    Value:BONE FROM RIGHT 2ND TOE:  ACUTE OSTEOMYELITIS      Gross 11/09/2022    Final                    Value:MRN # on requisition [id number]  MRN # [id number] AP label [id number]  Received fresh, labeled phalanx bone R 2nd toe, are 2 pieces of bone  measuring together 1.7 x 1.1 x 1 cm.  Entirely submitted after  decalcification.   QZD--1-A    Grossed by BM [facility]      Disclaimer 11/09/2022    Final                    Value:Unless the case is a  'gross only' or additional testing only, the final  diagnosis for each specimen is based on a microscopic examination of  appropriate tissue sections.      Sodium 11/10/2022 136  136 - 145 mmol/L Final    Potassium 11/10/2022 4.3  3.5 - 5.1 mmol/L Final    Chloride 11/10/2022 109  95 - 110 mmol/L Final    CO2 11/10/2022 21 (L)  23 - 29 mmol/L Final    Glucose 11/10/2022 94  70 - 110 mg/dL Final    BUN 11/10/2022 10  8 - 23 mg/dL Final    Creatinine 11/10/2022 0.9  0.5 - 1.4 mg/dL Final    Calcium 11/10/2022 9.8  8.7 - 10.5 mg/dL Final    Anion Gap 11/10/2022 6 (L)  8 - 16 mmol/L Final    eGFR 11/10/2022 >60  >60 mL/min/1.73 m^2 Final    WBC 11/10/2022 6.73  3.90 - 12.70 K/uL Final    RBC 11/10/2022 4.48  4.00 - 5.40 M/uL Final    Hemoglobin 11/10/2022 11.1 (L)  12.0 - 16.0 g/dL Final    Hematocrit 11/10/2022 36.7 (L)  37.0 - 48.5 % Final    MCV 11/10/2022 82  82 - 98 fL Final    MCH 11/10/2022 24.8 (L)  27.0 - 31.0 pg Final    MCHC 11/10/2022 30.2 (L)  32.0 - 36.0 g/dL Final    RDW 11/10/2022 15.2 (H)  11.5 - 14.5 % Final    Platelets 11/10/2022 220  150 - 450 K/uL Final    MPV 11/10/2022 10.0  9.2 - 12.9 fL Final    Immature Granulocytes 11/10/2022 0.3  0.0 - 0.5 % Final    Gran # (ANC) 11/10/2022 3.3  1.8 - 7.7 K/uL Final    Immature Grans (Abs) 11/10/2022 0.02  0.00 - 0.04 K/uL Final    Lymph # 11/10/2022 1.8  1.0 - 4.8 K/uL Final    Mono # 11/10/2022 1.0  0.3 - 1.0 K/uL Final    Eos # 11/10/2022 0.6 (H)  0.0 - 0.5 K/uL Final    Baso # 11/10/2022 0.08  0.00 - 0.20 K/uL Final    nRBC 11/10/2022 0  0 /100 WBC Final    Gran % 11/10/2022 49.3  38.0 - 73.0 % Final    Lymph % 11/10/2022 26.0  18.0 - 48.0 % Final    Mono % 11/10/2022 15.0  4.0 - 15.0 % Final    Eosinophil % 11/10/2022 8.2 (H)  0.0 - 8.0 % Final    Basophil % 11/10/2022 1.2  0.0 - 1.9 % Final    Differential Method 11/10/2022 Automated   Final    Blood Culture, Routine 11/10/2022 No growth after 5 days.   Final     Blood Culture, Routine 11/10/2022 No growth after 5 days.   Final   (  Reviewed By    Arjun Horn MD on 10/18/2019 13:19     IN OFFICE EKG 12-LEAD (to Muse)  Order: [id number]  Status: Final result    Visible to patient: Yes (seen)     Next appt: 12/01/2022 at 01:00 PM in Podiatry (Alfa Quintero DPM)     Dx: Essential hypertension; Nonspecific a...     0 Result Notes     Narrative  Performed by: GEMUSE  Test Reason : I10,I35.1,R94.31,I49.1,R06.09,I35.1,     Vent. Rate : 068 BPM     Atrial Rate : 068 BPM      P-R Int : 162 ms          QRS Dur : 080 ms       QT Int : 422 ms       P-R-T Axes : 073 063 088 degrees      QTc Int : 448 ms     Normal sinus rhythm   Anteroseptal infarct (cited on or before 27-MAR-2018)   Abnormal ECG   When compared with ECG of 02-JUL-2019 14:32,   Questionable change in initial forces of Septal leads   T wave amplitude has increased in Inferior leads   Nonspecific T wave abnormality, improved in Lateral leads   Confirmed by Arjun Horn MD ([id number]) on 10/18/2019 12:20:34 PM     Referred By:  tushar           Confirmed By:Arjun Horn MD      Specimen Collected: 10/17/19 14:43 Last Resulted: 10/18/19 12:20       Order Details     View Encounter     Lab and Collection Details     Routing    Result History    View Encounter Conversation          Result Care Coordination        Accession #: [id number]  Stress Echo  Order# [id number]  Reading physician: Arjun Horn MD Ordering physician: Arjun Horn MD Study date: 10/30/19     Reason for Exam  Priority: Routine  Dx: Nonspecific abnormal electrocardiogram (ECG) (EKG) [R94.31 (ICD-10-CM)]; Dyspnea on exertion [R06.09 (ICD-10-CM)]; Chest pain of uncertain etiology [R07.9 (ICD-10-CM)]     Result Image Hyperlink     Show images for Stress Echo Which stress agent will be used? Treadmill Exercise; Color Flow Doppler? Yes  Summary    Arrhythmias during stress: rare PACs,  The test was stopped because the patient  experienced shortness of breath.  Normal left ventricular systolic function. The estimated ejection fraction is 70%  Grade I (mild) left ventricular diastolic dysfunction consistent with impaired relaxation.  Normal right ventricular systolic function.  Normal central venous pressure (3 mm Hg).  The stress echo portion of this study is negative for myocardial ischemia.  Moderate concentric left ventricular hypertrophy.  No wall motion abnormalities.  The patient's exercise capacity was moderately impaired.  The EKG portion of this study is negative for myocardial ischemia.     Assessment:     1. Aortic valve insufficiency, etiology of cardiac valve disease unspecified    2. Essential hypertension    3. Nonspecific abnormal electrocardiogram (ECG) (EKG)    4. Premature atrial contractions    5. Hyponatremia    6. Hypotension due to drugs      Plan:   Autumn was seen today for follow-up.    Diagnoses and all orders for this visit:    Aortic valve insufficiency, etiology of cardiac valve disease unspecified  -     IN OFFICE EKG 12-LEAD (to Muse)    Essential hypertension  -     IN OFFICE EKG 12-LEAD (to Muse)    Nonspecific abnormal electrocardiogram (ECG) (EKG)  -     IN OFFICE EKG 12-LEAD (to Muse)    Premature atrial contractions  -     IN OFFICE EKG 12-LEAD (to Muse)    Hyponatremia  -     IN OFFICE EKG 12-LEAD (to Muse)    Hypotension due to drugs  -     IN OFFICE EKG 12-LEAD (to Muse)     Cardiac status is stable    BP is normal off antihypertensives    Same meds    Follow up in about 1 year (around 11/22/2023).

## 2022-12-01 ENCOUNTER — OFFICE VISIT (OUTPATIENT)
Dept: PODIATRY | Facility: CLINIC | Age: 77
End: 2022-12-01
Payer: MEDICARE

## 2022-12-01 VITALS
HEART RATE: 68 BPM | HEIGHT: 63 IN | BODY MASS INDEX: 24.41 KG/M2 | DIASTOLIC BLOOD PRESSURE: 82 MMHG | SYSTOLIC BLOOD PRESSURE: 177 MMHG

## 2022-12-01 DIAGNOSIS — S92.501D: Primary | ICD-10-CM

## 2022-12-01 DIAGNOSIS — T81.31XA POSTOPERATIVE WOUND DEHISCENCE, INITIAL ENCOUNTER: ICD-10-CM

## 2022-12-01 PROCEDURE — 3077F SYST BP >= 140 MM HG: CPT | Mod: CPTII,S$GLB,, | Performed by: PODIATRIST

## 2022-12-01 PROCEDURE — 3079F PR MOST RECENT DIASTOLIC BLOOD PRESSURE 80-89 MM HG: ICD-10-PCS | Mod: CPTII,S$GLB,, | Performed by: PODIATRIST

## 2022-12-01 PROCEDURE — 99999 PR PBB SHADOW E&M-EST. PATIENT-LVL III: ICD-10-PCS | Mod: PBBFAC,,, | Performed by: PODIATRIST

## 2022-12-01 PROCEDURE — 99999 PR PBB SHADOW E&M-EST. PATIENT-LVL III: CPT | Mod: PBBFAC,,, | Performed by: PODIATRIST

## 2022-12-01 PROCEDURE — 1159F PR MEDICATION LIST DOCUMENTED IN MEDICAL RECORD: ICD-10-PCS | Mod: CPTII,S$GLB,, | Performed by: PODIATRIST

## 2022-12-01 PROCEDURE — 3079F DIAST BP 80-89 MM HG: CPT | Mod: CPTII,S$GLB,, | Performed by: PODIATRIST

## 2022-12-01 PROCEDURE — 99024 PR POST-OP FOLLOW-UP VISIT: ICD-10-PCS | Mod: S$GLB,,, | Performed by: PODIATRIST

## 2022-12-01 PROCEDURE — 99024 POSTOP FOLLOW-UP VISIT: CPT | Mod: S$GLB,,, | Performed by: PODIATRIST

## 2022-12-01 PROCEDURE — 3077F PR MOST RECENT SYSTOLIC BLOOD PRESSURE >= 140 MM HG: ICD-10-PCS | Mod: CPTII,S$GLB,, | Performed by: PODIATRIST

## 2022-12-01 PROCEDURE — 1160F PR REVIEW ALL MEDS BY PRESCRIBER/CLIN PHARMACIST DOCUMENTED: ICD-10-PCS | Mod: CPTII,S$GLB,, | Performed by: PODIATRIST

## 2022-12-01 PROCEDURE — 1160F RVW MEDS BY RX/DR IN RCRD: CPT | Mod: CPTII,S$GLB,, | Performed by: PODIATRIST

## 2022-12-01 PROCEDURE — 1159F MED LIST DOCD IN RCRD: CPT | Mod: CPTII,S$GLB,, | Performed by: PODIATRIST

## 2022-12-01 PROCEDURE — 1125F AMNT PAIN NOTED PAIN PRSNT: CPT | Mod: CPTII,S$GLB,, | Performed by: PODIATRIST

## 2022-12-01 PROCEDURE — 1125F PR PAIN SEVERITY QUANTIFIED, PAIN PRESENT: ICD-10-PCS | Mod: CPTII,S$GLB,, | Performed by: PODIATRIST

## 2022-12-01 NOTE — PROGRESS NOTES
"Subjective:      Patient ID: Autumn Harris is a 77 y.o. female.    Chief Complaint: Foot Problem (Fracture toe) and Follow-up    Status post middle phalangectomy right 2nd toe for open fracture of the middle phalanx on 11/09/2022.  Relates intermittent aching pain to the right toe.  She is getting nausea from taking oral antibiotics which consisted ciprofloxacin doxycycline.  Intraop C&S grew 0SSA.  Dressing has remained clean, dry intact since discharge.  She is receiving home health for PT/OT.  She states concern over using the rolling walker and is requesting a cane for balance and support.    12/01/2022:  Complete her previous p.o. antibiotic.  Relates some intermittent pain to the right foot.  She feels it is from compensation.    Vitals:    12/01/22 1258   BP: (!) 177/82   Pulse: 68   Height: 5' 3" (1.6 m)   PainSc:   6   PainLoc: Foot      Past Medical History:   Diagnosis Date    Anxiety     CKD (chronic kidney disease) stage 3, GFR 30-59 ml/min     COPD (chronic obstructive pulmonary disease)     Depression     Encounter for blood transfusion     Fibromyalgia     Hematuria     High cholesterol     Hypertension     Oral cancer     Proteinuria     Sore throat 7/3/2019    Urinary tract infection     Vitamin D deficiency        Past Surgical History:   Procedure Laterality Date    ABDOMINAL SURGERY      [facility]    HYSTERECTOMY      INSERTION OF IMPLANTABLE LOOP RECORDER N/A 10/15/2021    Procedure: Insertion, Implantable Loop Recorder;  Surgeon: Cristhian Alvares MD;  Location: [facility] CATH LAB/EP;  Service: Cardiology;  Laterality: N/A;    IRRIGATION AND DEBRIDEMENT OF LOWER EXTREMITY Right 11/9/2022    Procedure: IRRIGATION AND DEBRIDEMENT, LOWER EXTREMITY, SECOND TOE;  Surgeon: Alfa Quintero DPM;  Location: [facility] OR;  Service: Podiatry;  Laterality: Right;  micro sagittal saw, vancomycin powder    OOPHORECTOMY      only one removed    Stomach Ulcer Surgery         Family History   Adopted: Yes   Problem " Relation Age of Onset    Heart disease Mother     Pacemaker/defibrilator Brother     Heart disease Brother     Heart disease Maternal Aunt     Lung cancer Brother        Social History     Socioeconomic History    Marital status:    Tobacco Use    Smoking status: Former     Packs/day: 1.50     Years: 45.00     Pack years: 67.50     Types: Cigarettes     Quit date: 1995     Years since quittin.3    Smokeless tobacco: Never    Tobacco comments:     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   Substance and Sexual Activity    Alcohol use: No    Drug use: Never     Social Determinants of Health     Financial Resource Strain: Low Risk     Difficulty of Paying Living Expenses: Not hard at all   Food Insecurity: No Food Insecurity    Worried About Running Out of Food in the Last Year: Never true    Ran Out of Food in the Last Year: Never true   Transportation Needs: No Transportation Needs    Lack of Transportation (Medical): No    Lack of Transportation (Non-Medical): No   Physical Activity: Unknown    Days of Exercise per Week: Patient refused    Minutes of Exercise per Session: Patient refused   Stress: Unknown    Feeling of Stress : Patient refused   Social Connections: Unknown    Frequency of Communication with Friends and Family: More than three times a week    Frequency of Social Gatherings with Friends and Family: More than three times a week    Attends Orthodoxy Services: Patient refused    Active Member of Clubs or Organizations: Patient refused    Attends Club or Organization Meetings: Patient refused    Marital Status:    Housing Stability: Low Risk     Unable to Pay for Housing in the Last Year: No    Number of Places Lived in the Last Year: 1    Unstable Housing in the Last Year: No       Current Outpatient Medications   Medication Sig Dispense Refill    acarbose (PRECOSE) 25 MG Tab Take 25 mg by mouth 3 (three) times daily.      albuterol (VENTOLIN HFA) 90 mcg/actuation inhaler  Inhale 2 puffs into the lungs every 6 (six) hours as needed for Wheezing. Rescue 18 g 0    azelastine (ASTELIN) 137 mcg (0.1 %) nasal spray 2 sprays by Nasal route 2 (two) times daily.  5    blood sugar diagnostic Strp 1 strip by Misc.(Non-Drug; Combo Route) route 3 (three) times daily as needed. 30 each 3    busPIRone (BUSPAR) 5 MG Tab Take 5 mg by mouth 2 (two) times daily.      clorazepate (TRANXENE) 3.75 MG Tab Take 7.5 mg by mouth 2 (two) times daily. 2 tablets      doxycycline (VIBRA-TABS) 100 MG tablet Take 1 tablet (100 mg total) by mouth 2 (two) times daily. 20 tablet 0    ergocalciferol (ERGOCALCIFEROL) 50,000 unit Cap Take 50,000 Units by mouth every Monday.      fluticasone (FLONASE) 50 mcg/actuation nasal spray 2 sprays by Each Nostril route Daily.  0    HYDROcodone-acetaminophen (NORCO)  mg per tablet Take 1 tablet by mouth every 4 (four) hours as needed for Pain. 30 tablet 0    levocetirizine (XYZAL) 5 MG tablet Take 1 tablet (5 mg total) by mouth every evening. 30 tablet 11    montelukast (SINGULAIR) 10 mg tablet Take 10 mg by mouth once daily.  11    pantoprazole (PROTONIX) 40 MG tablet TAKE 1 TABLET BY MOUTH EVERY DAY (Patient taking differently: Take 40 mg by mouth once daily.) 90 tablet 3    triamcinolone acetonide 0.1% (KENALOG) 0.1 % cream Apply topically 2 (two) times daily. Apply to rash on on hand and ankle as needed      blood-glucose meter kit Use as instructed 1 each 0     No current facility-administered medications for this visit.       Review of patient's allergies indicates:   Allergen Reactions    Pcn [penicillins]     Sulfa (sulfonamide antibiotics)          Review of Systems   Constitutional: Negative for chills, fever and malaise/fatigue.   HENT:  Negative for congestion.    Cardiovascular:  Negative for chest pain and claudication.   Respiratory:  Negative for cough and shortness of breath.    Skin:  Positive for color change.   Musculoskeletal:  Positive for muscle  weakness.   Gastrointestinal:  Negative for nausea and vomiting.   Neurological:  Negative for numbness and paresthesias.         Objective:      Physical Exam  Constitutional:       General: She is not in acute distress.     Appearance: She is not ill-appearing.   Cardiovascular:      Pulses:           Dorsalis pedis pulses are 2+ on the right side.        Posterior tibial pulses are 2+ on the right side.   Musculoskeletal:      Comments: Rectus appearance the right 2nd toe with reduced edema however it is dorsiflexed at the metatarsophalangeal joint.  Mild localized pain on palpation to the right 2nd toe.     Skin:     General: Skin is warm.      Capillary Refill: Capillary refill takes less than 2 seconds.      Findings: No erythema.      Nails: There is no clubbing.      Comments: Eschar formation overlying previous transverse incision right 2nd toe.  Moderate reduction in edema to the right 2nd toe.  No residual erythema.  No active drainage.  Sutures are intact to the incision site.  No palpable fluctuance or crepitance.   Neurological:      Mental Status: She is alert.             Assessment:       Encounter Diagnoses   Name Primary?    Open fracture of phalanx of lesser toe of right foot with routine healing, subsequent encounter Yes    Postoperative wound dehiscence, initial encounter          Plan:       Autumn was seen today for foot problem and follow-up.    Diagnoses and all orders for this visit:    Open fracture of phalanx of lesser toe of right foot with routine healing, subsequent encounter    Postoperative wound dehiscence, initial encounter    I counseled the patient on her conditions, their implications and medical management.    Sutures removed.  One residual suture noted below the eschar however will remove once the eschar sloughed soft.  Applied Betadine with Mepilex border to incision site.  Home care instructions with daily Betadine and Band-Aid reviewed.      Weight-bearing as tolerated  right foot.  Patient may slowly transition into a shoe as tolerated.    RTC within 1 month or p.r.n. as discussed for wound check.    A portion of this note was generated by voice recognition software and may contain spelling and grammar errors.      .

## 2022-12-07 LAB — FUNGUS SPEC CULT: NORMAL

## 2022-12-09 ENCOUNTER — EXTERNAL HOME HEALTH (OUTPATIENT)
Dept: HOME HEALTH SERVICES | Facility: HOSPITAL | Age: 77
End: 2022-12-09
Payer: MEDICARE

## 2022-12-09 ENCOUNTER — CLINICAL SUPPORT (OUTPATIENT)
Dept: CARDIOLOGY | Facility: HOSPITAL | Age: 77
End: 2022-12-09
Payer: MEDICARE

## 2022-12-09 DIAGNOSIS — Z95.818 PRESENCE OF OTHER CARDIAC IMPLANTS AND GRAFTS: ICD-10-CM

## 2022-12-09 PROCEDURE — G2066 INTER DEVC REMOTE 30D: HCPCS | Performed by: INTERNAL MEDICINE

## 2022-12-29 LAB
ACID FAST MOD KINY STN SPEC: NORMAL
MYCOBACTERIUM SPEC QL CULT: NORMAL

## 2023-01-08 ENCOUNTER — CLINICAL SUPPORT (OUTPATIENT)
Dept: CARDIOLOGY | Facility: HOSPITAL | Age: 78
End: 2023-01-08
Payer: MEDICARE

## 2023-01-08 DIAGNOSIS — Z95.818 PRESENCE OF OTHER CARDIAC IMPLANTS AND GRAFTS: ICD-10-CM

## 2023-01-08 PROCEDURE — 93298 REM INTERROG DEV EVAL SCRMS: CPT | Mod: ,,, | Performed by: INTERNAL MEDICINE

## 2023-01-08 PROCEDURE — G2066 INTER DEVC REMOTE 30D: HCPCS | Performed by: INTERNAL MEDICINE

## 2023-01-08 PROCEDURE — 93298 CARDIAC DEVICE CHECK - REMOTE: ICD-10-PCS | Mod: ,,, | Performed by: INTERNAL MEDICINE

## 2023-01-09 ENCOUNTER — HOSPITAL ENCOUNTER (OUTPATIENT)
Dept: RADIOLOGY | Facility: HOSPITAL | Age: 78
Discharge: HOME | End: 2023-01-09
Attending: PODIATRIST
Payer: MEDICARE

## 2023-01-09 ENCOUNTER — OFFICE VISIT (OUTPATIENT)
Dept: PODIATRY | Facility: CLINIC | Age: 78
End: 2023-01-09
Payer: MEDICARE

## 2023-01-09 VITALS
WEIGHT: 137 LBS | DIASTOLIC BLOOD PRESSURE: 82 MMHG | SYSTOLIC BLOOD PRESSURE: 145 MMHG | HEART RATE: 71 BPM | HEIGHT: 63 IN | BODY MASS INDEX: 24.27 KG/M2

## 2023-01-09 DIAGNOSIS — M20.5X1 CONTRACTURE OF TOE, RIGHT: ICD-10-CM

## 2023-01-09 DIAGNOSIS — M77.51 CAPSULITIS OF METATARSOPHALANGEAL (MTP) JOINT OF RIGHT FOOT: ICD-10-CM

## 2023-01-09 DIAGNOSIS — G57.61 MORTON'S NEUROMA OF SECOND INTERSPACE OF RIGHT FOOT: ICD-10-CM

## 2023-01-09 DIAGNOSIS — M20.5X1 CONTRACTURE OF TOE, RIGHT: Primary | ICD-10-CM

## 2023-01-09 DIAGNOSIS — Z98.890 POSTOPERATIVE STATE: ICD-10-CM

## 2023-01-09 PROCEDURE — 99213 PR OFFICE/OUTPT VISIT, EST, LEVL III, 20-29 MIN: ICD-10-PCS | Mod: 24,S$GLB,, | Performed by: PODIATRIST

## 2023-01-09 PROCEDURE — 73630 X-RAY EXAM OF FOOT: CPT | Mod: 26,RT,, | Performed by: RADIOLOGY

## 2023-01-09 PROCEDURE — 3077F SYST BP >= 140 MM HG: CPT | Mod: CPTII,S$GLB,, | Performed by: PODIATRIST

## 2023-01-09 PROCEDURE — 1101F PT FALLS ASSESS-DOCD LE1/YR: CPT | Mod: CPTII,S$GLB,, | Performed by: PODIATRIST

## 2023-01-09 PROCEDURE — 73630 X-RAY EXAM OF FOOT: CPT | Mod: TC,FY,RT

## 2023-01-09 PROCEDURE — 73630 XR FOOT COMPLETE 3 VIEW RIGHT: ICD-10-PCS | Mod: 26,RT,, | Performed by: RADIOLOGY

## 2023-01-09 PROCEDURE — 3077F PR MOST RECENT SYSTOLIC BLOOD PRESSURE >= 140 MM HG: ICD-10-PCS | Mod: CPTII,S$GLB,, | Performed by: PODIATRIST

## 2023-01-09 PROCEDURE — 3288F PR FALLS RISK ASSESSMENT DOCUMENTED: ICD-10-PCS | Mod: CPTII,S$GLB,, | Performed by: PODIATRIST

## 2023-01-09 PROCEDURE — 99999 PR PBB SHADOW E&M-EST. PATIENT-LVL IV: CPT | Mod: PBBFAC,,, | Performed by: PODIATRIST

## 2023-01-09 PROCEDURE — 1160F RVW MEDS BY RX/DR IN RCRD: CPT | Mod: CPTII,S$GLB,, | Performed by: PODIATRIST

## 2023-01-09 PROCEDURE — 1125F PR PAIN SEVERITY QUANTIFIED, PAIN PRESENT: ICD-10-PCS | Mod: CPTII,S$GLB,, | Performed by: PODIATRIST

## 2023-01-09 PROCEDURE — 1159F MED LIST DOCD IN RCRD: CPT | Mod: CPTII,S$GLB,, | Performed by: PODIATRIST

## 2023-01-09 PROCEDURE — 99999 PR PBB SHADOW E&M-EST. PATIENT-LVL IV: ICD-10-PCS | Mod: PBBFAC,,, | Performed by: PODIATRIST

## 2023-01-09 PROCEDURE — 3288F FALL RISK ASSESSMENT DOCD: CPT | Mod: CPTII,S$GLB,, | Performed by: PODIATRIST

## 2023-01-09 PROCEDURE — 1101F PR PT FALLS ASSESS DOC 0-1 FALLS W/OUT INJ PAST YR: ICD-10-PCS | Mod: CPTII,S$GLB,, | Performed by: PODIATRIST

## 2023-01-09 PROCEDURE — 1125F AMNT PAIN NOTED PAIN PRSNT: CPT | Mod: CPTII,S$GLB,, | Performed by: PODIATRIST

## 2023-01-09 PROCEDURE — 1159F PR MEDICATION LIST DOCUMENTED IN MEDICAL RECORD: ICD-10-PCS | Mod: CPTII,S$GLB,, | Performed by: PODIATRIST

## 2023-01-09 PROCEDURE — 99213 OFFICE O/P EST LOW 20 MIN: CPT | Mod: 24,S$GLB,, | Performed by: PODIATRIST

## 2023-01-09 PROCEDURE — 3079F DIAST BP 80-89 MM HG: CPT | Mod: CPTII,S$GLB,, | Performed by: PODIATRIST

## 2023-01-09 PROCEDURE — 3079F PR MOST RECENT DIASTOLIC BLOOD PRESSURE 80-89 MM HG: ICD-10-PCS | Mod: CPTII,S$GLB,, | Performed by: PODIATRIST

## 2023-01-09 PROCEDURE — 1160F PR REVIEW ALL MEDS BY PRESCRIBER/CLIN PHARMACIST DOCUMENTED: ICD-10-PCS | Mod: CPTII,S$GLB,, | Performed by: PODIATRIST

## 2023-01-09 NOTE — PROGRESS NOTES
"Subjective:      Patient ID: Autumn Harris is a 77 y.o. female.    Chief Complaint: Post-op Evaluation (1 month post op eval right foot)      Status post middle phalangectomy right 2nd toe for open fracture of the middle phalanx on 11/09/2022.  Relates intermittent aching pain to the right toe.  She is getting nausea from taking oral antibiotics which consisted ciprofloxacin doxycycline.  Intraop C&S grew 0SSA.  Dressing has remained clean, dry intact since discharge.  She is receiving home health for PT/OT.  She states concern over using the rolling walker and is requesting a cane for balance and support.    12/01/2022:  Complete her previous p.o. antibiotic.  Relates some intermittent pain to the right foot.  She feels it is from compensation.    01/09/2023:  Presents complaining of pain underneath the ball of the right foot.  She has difficulty wearing shoes and is wearing sandals.  Also notes that her right 2nd toe sticks up a little bit.    Vitals:    01/09/23 1443   BP: (!) 145/82   Pulse: 71   Weight: 62.1 kg (137 lb)   Height: 5' 3" (1.6 m)   PainSc:   5   PainLoc: Foot      Past Medical History:   Diagnosis Date    Anxiety     CKD (chronic kidney disease) stage 3, GFR 30-59 ml/min     COPD (chronic obstructive pulmonary disease)     Depression     Encounter for blood transfusion     Fibromyalgia     Hematuria     High cholesterol     Hypertension     Oral cancer     Proteinuria     Sore throat 7/3/2019    Urinary tract infection     Vitamin D deficiency        Past Surgical History:   Procedure Laterality Date    ABDOMINAL SURGERY      [facility]    HYSTERECTOMY      INSERTION OF IMPLANTABLE LOOP RECORDER N/A 10/15/2021    Procedure: Insertion, Implantable Loop Recorder;  Surgeon: Cristhian Alvares MD;  Location: [facility] CATH LAB/EP;  Service: Cardiology;  Laterality: N/A;    IRRIGATION AND DEBRIDEMENT OF LOWER EXTREMITY Right 11/9/2022    Procedure: IRRIGATION AND DEBRIDEMENT, LOWER EXTREMITY, SECOND TOE;  " Surgeon: Alfa Quintero DPM;  Location: [facility];  Service: Podiatry;  Laterality: Right;  micro sagittal saw, vancomycin powder    OOPHORECTOMY      only one removed    Stomach Ulcer Surgery         Family History   Adopted: Yes   Problem Relation Age of Onset    Heart disease Mother     Pacemaker/defibrilator Brother     Heart disease Brother     Lung cancer Brother     Heart disease Maternal Aunt        Social History     Socioeconomic History    Marital status:    Tobacco Use    Smoking status: Former     Packs/day: 1.50     Years: 45.00     Pack years: 67.50     Types: Cigarettes     Quit date: 1995     Years since quittin.4    Smokeless tobacco: Never    Tobacco comments:     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   Substance and Sexual Activity    Alcohol use: No    Drug use: Never     Social Determinants of Health     Financial Resource Strain: Low Risk     Difficulty of Paying Living Expenses: Not hard at all   Food Insecurity: No Food Insecurity    Worried About Running Out of Food in the Last Year: Never true    Ran Out of Food in the Last Year: Never true   Transportation Needs: No Transportation Needs    Lack of Transportation (Medical): No    Lack of Transportation (Non-Medical): No   Physical Activity: Unknown    Days of Exercise per Week: Patient refused    Minutes of Exercise per Session: Patient refused   Stress: Unknown    Feeling of Stress : Patient refused   Social Connections: Unknown    Frequency of Communication with Friends and Family: More than three times a week    Frequency of Social Gatherings with Friends and Family: More than three times a week    Attends Orthodoxy Services: Patient refused    Active Member of Clubs or Organizations: Patient refused    Attends Club or Organization Meetings: Patient refused    Marital Status:    Housing Stability: Low Risk     Unable to Pay for Housing in the Last Year: No    Number of Places Lived in the Last Year:  1    Unstable Housing in the Last Year: No       Current Outpatient Medications   Medication Sig Dispense Refill    acarbose (PRECOSE) 25 MG Tab Take 25 mg by mouth 3 (three) times daily.      albuterol (VENTOLIN HFA) 90 mcg/actuation inhaler Inhale 2 puffs into the lungs every 6 (six) hours as needed for Wheezing. Rescue 18 g 0    azelastine (ASTELIN) 137 mcg (0.1 %) nasal spray 2 sprays by Nasal route 2 (two) times daily.  5    blood sugar diagnostic Strp 1 strip by Misc.(Non-Drug; Combo Route) route 3 (three) times daily as needed. 30 each 3    busPIRone (BUSPAR) 5 MG Tab Take 5 mg by mouth 2 (two) times daily.      clorazepate (TRANXENE) 3.75 MG Tab Take 7.5 mg by mouth 2 (two) times daily. 2 tablets      doxycycline (VIBRA-TABS) 100 MG tablet Take 1 tablet (100 mg total) by mouth 2 (two) times daily. 20 tablet 0    ergocalciferol (ERGOCALCIFEROL) 50,000 unit Cap Take 50,000 Units by mouth every Monday.      fluticasone (FLONASE) 50 mcg/actuation nasal spray 2 sprays by Each Nostril route Daily.  0    levocetirizine (XYZAL) 5 MG tablet Take 1 tablet (5 mg total) by mouth every evening. 30 tablet 11    montelukast (SINGULAIR) 10 mg tablet Take 10 mg by mouth once daily.  11    pantoprazole (PROTONIX) 40 MG tablet TAKE 1 TABLET BY MOUTH EVERY DAY (Patient taking differently: Take 40 mg by mouth once daily.) 90 tablet 3    triamcinolone acetonide 0.1% (KENALOG) 0.1 % cream Apply topically 2 (two) times daily. Apply to rash on on hand and ankle as needed      blood-glucose meter kit Use as instructed 1 each 0    HYDROcodone-acetaminophen (NORCO)  mg per tablet Take 1 tablet by mouth every 4 (four) hours as needed for Pain. (Patient not taking: Reported on 1/9/2023) 30 tablet 0     No current facility-administered medications for this visit.       Review of patient's allergies indicates:   Allergen Reactions    Pcn [penicillins]     Sulfa (sulfonamide antibiotics)          Review of Systems   Constitutional:  Negative for chills, fever and malaise/fatigue.   HENT:  Negative for congestion.    Cardiovascular:  Negative for chest pain and claudication.   Respiratory:  Negative for cough and shortness of breath.    Skin:  Positive for color change.   Musculoskeletal:  Positive for muscle weakness.   Gastrointestinal:  Negative for nausea and vomiting.   Neurological:  Negative for numbness and paresthesias.         Objective:      Physical Exam  Constitutional:       General: She is not in acute distress.     Appearance: She is not ill-appearing.   Cardiovascular:      Pulses:           Dorsalis pedis pulses are 2+ on the right side.        Posterior tibial pulses are 2+ on the right side.   Musculoskeletal:      Comments: Rectus appearance the right 2nd toe with mild edema however it is dorsiflexed at the metatarsophalangeal joint.  Palpable bony prominence overlying the dorsal 2nd met head and the base of the proximal phalanx right 2nd toe which is most likely causing the extensor tendon to contract.  Pain on palpation plantar 2 MTP and distal 2nd intermetatarsal space right foot.  Negative Lachman test right 2nd toe.  Reducible flexion adductovarus contracture right 2nd with medial deviation of the 4th toe.   Skin:     General: Skin is warm.      Capillary Refill: Capillary refill takes less than 2 seconds.      Findings: No erythema.      Nails: There is no clubbing.      Comments: Normal-appearing transverse scar dorsal right 2nd toe.  No signs of infection.   Neurological:      Mental Status: She is alert.             Assessment:       Encounter Diagnoses   Name Primary?    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    Applied crest pad to the right 2nd toe to help prevent it from dorsiflexing.    We discussed obtaining weight-bearing right foot x-ray to assess the underlying osseous pathology.  We discussed possible open lengthening of the extensor tendon with dorsal capsulotomy and partial ostectomy of the 2nd met head and possibly the base of the proximal phalanx.  I am also concerned that she may have a Thomason's neuroma to the distal 2nd intermetatarsal space however would have to observe this for now.    RTC within 4 weeks or p.r.n. as discussed.    A portion of this note was generated by voice recognition software and may contain spelling and grammar errors.      .

## 2023-01-11 ENCOUNTER — PATIENT MESSAGE (OUTPATIENT)
Dept: PODIATRY | Facility: HOSPITAL | Age: 78
End: 2023-01-11
Payer: MEDICARE

## 2023-02-07 ENCOUNTER — CLINICAL SUPPORT (OUTPATIENT)
Dept: CARDIOLOGY | Facility: HOSPITAL | Age: 78
End: 2023-02-07
Payer: MEDICARE

## 2023-02-07 DIAGNOSIS — Z95.818 PRESENCE OF OTHER CARDIAC IMPLANTS AND GRAFTS: ICD-10-CM

## 2023-02-07 PROCEDURE — G2066 INTER DEVC REMOTE 30D: HCPCS | Performed by: INTERNAL MEDICINE

## 2023-02-13 ENCOUNTER — OFFICE VISIT (OUTPATIENT)
Dept: PODIATRY | Facility: CLINIC | Age: 78
End: 2023-02-13
Payer: MEDICARE

## 2023-02-13 VITALS
HEIGHT: 63 IN | SYSTOLIC BLOOD PRESSURE: 139 MMHG | DIASTOLIC BLOOD PRESSURE: 81 MMHG | HEART RATE: 83 BPM | WEIGHT: 137 LBS | BODY MASS INDEX: 24.27 KG/M2

## 2023-02-13 DIAGNOSIS — M20.5X1 CONTRACTURE OF TOE, RIGHT: Primary | ICD-10-CM

## 2023-02-13 PROCEDURE — 3079F PR MOST RECENT DIASTOLIC BLOOD PRESSURE 80-89 MM HG: ICD-10-PCS | Mod: CPTII,S$GLB,, | Performed by: PODIATRIST

## 2023-02-13 PROCEDURE — 3288F FALL RISK ASSESSMENT DOCD: CPT | Mod: CPTII,S$GLB,, | Performed by: PODIATRIST

## 2023-02-13 PROCEDURE — 1160F RVW MEDS BY RX/DR IN RCRD: CPT | Mod: CPTII,S$GLB,, | Performed by: PODIATRIST

## 2023-02-13 PROCEDURE — 1160F PR REVIEW ALL MEDS BY PRESCRIBER/CLIN PHARMACIST DOCUMENTED: ICD-10-PCS | Mod: CPTII,S$GLB,, | Performed by: PODIATRIST

## 2023-02-13 PROCEDURE — 1126F PR PAIN SEVERITY QUANTIFIED, NO PAIN PRESENT: ICD-10-PCS | Mod: CPTII,S$GLB,, | Performed by: PODIATRIST

## 2023-02-13 PROCEDURE — 99213 OFFICE O/P EST LOW 20 MIN: CPT | Mod: S$GLB,,, | Performed by: PODIATRIST

## 2023-02-13 PROCEDURE — 1126F AMNT PAIN NOTED NONE PRSNT: CPT | Mod: CPTII,S$GLB,, | Performed by: PODIATRIST

## 2023-02-13 PROCEDURE — 1101F PT FALLS ASSESS-DOCD LE1/YR: CPT | Mod: CPTII,S$GLB,, | Performed by: PODIATRIST

## 2023-02-13 PROCEDURE — 1159F MED LIST DOCD IN RCRD: CPT | Mod: CPTII,S$GLB,, | Performed by: PODIATRIST

## 2023-02-13 PROCEDURE — 3079F DIAST BP 80-89 MM HG: CPT | Mod: CPTII,S$GLB,, | Performed by: PODIATRIST

## 2023-02-13 PROCEDURE — 99999 PR PBB SHADOW E&M-EST. PATIENT-LVL III: ICD-10-PCS | Mod: PBBFAC,,, | Performed by: PODIATRIST

## 2023-02-13 PROCEDURE — 3075F SYST BP GE 130 - 139MM HG: CPT | Mod: CPTII,S$GLB,, | Performed by: PODIATRIST

## 2023-02-13 PROCEDURE — 1101F PR PT FALLS ASSESS DOC 0-1 FALLS W/OUT INJ PAST YR: ICD-10-PCS | Mod: CPTII,S$GLB,, | Performed by: PODIATRIST

## 2023-02-13 PROCEDURE — 99213 PR OFFICE/OUTPT VISIT, EST, LEVL III, 20-29 MIN: ICD-10-PCS | Mod: S$GLB,,, | Performed by: PODIATRIST

## 2023-02-13 PROCEDURE — 1159F PR MEDICATION LIST DOCUMENTED IN MEDICAL RECORD: ICD-10-PCS | Mod: CPTII,S$GLB,, | Performed by: PODIATRIST

## 2023-02-13 PROCEDURE — 99999 PR PBB SHADOW E&M-EST. PATIENT-LVL III: CPT | Mod: PBBFAC,,, | Performed by: PODIATRIST

## 2023-02-13 PROCEDURE — 3288F PR FALLS RISK ASSESSMENT DOCUMENTED: ICD-10-PCS | Mod: CPTII,S$GLB,, | Performed by: PODIATRIST

## 2023-02-13 PROCEDURE — 3075F PR MOST RECENT SYSTOLIC BLOOD PRESS GE 130-139MM HG: ICD-10-PCS | Mod: CPTII,S$GLB,, | Performed by: PODIATRIST

## 2023-02-13 NOTE — PROGRESS NOTES
"Subjective:      Patient ID: Autumn Harris is a 77 y.o. female.    Chief Complaint: Follow-up (Right toe)      Status post middle phalangectomy right 2nd toe for open fracture of the middle phalanx on 11/09/2022.  Relates intermittent aching pain to the right toe.  She is getting nausea from taking oral antibiotics which consisted ciprofloxacin doxycycline.  Intraop C&S grew 0SSA.  Dressing has remained clean, dry intact since discharge.  She is receiving home health for PT/OT.  She states concern over using the rolling walker and is requesting a cane for balance and support.    12/01/2022:  Complete her previous p.o. antibiotic.  Relates some intermittent pain to the right foot.  She feels it is from compensation.    01/09/2023:  Presents complaining of pain underneath the ball of the right foot.  She has difficulty wearing shoes and is wearing sandals.  Also notes that her right 2nd toe sticks up a little bit.    02/13/2023:  Greater than 3 months post middle phalangectomy right 2nd toe secondary to open fracture with osteomyelitis.  Doing well with crest pad.  Relates no pain.  Right foot x-ray was completed.    Vitals:    02/13/23 1533   BP: 139/81   Pulse: 83   Weight: 62.1 kg (137 lb)   Height: 5' 3" (1.6 m)   PainSc: 0-No pain      Past Medical History:   Diagnosis Date    Anxiety     CKD (chronic kidney disease) stage 3, GFR 30-59 ml/min     COPD (chronic obstructive pulmonary disease)     Depression     Encounter for blood transfusion     Fibromyalgia     Hematuria     High cholesterol     Hypertension     Oral cancer     Proteinuria     Sore throat 7/3/2019    Urinary tract infection     Vitamin D deficiency        Past Surgical History:   Procedure Laterality Date    ABDOMINAL SURGERY      [facility]    HYSTERECTOMY      INSERTION OF IMPLANTABLE LOOP RECORDER N/A 10/15/2021    Procedure: Insertion, Implantable Loop Recorder;  Surgeon: Cristhian Alvares MD;  Location: [facility] CATH LAB/EP;  Service: Cardiology;  " Laterality: N/A;    IRRIGATION AND DEBRIDEMENT OF LOWER EXTREMITY Right 2022    Procedure: IRRIGATION AND DEBRIDEMENT, LOWER EXTREMITY, SECOND TOE;  Surgeon: Alfa Quintero DPM;  Location: [facility];  Service: Podiatry;  Laterality: Right;  micro sagittal saw, vancomycin powder    OOPHORECTOMY      only one removed    Stomach Ulcer Surgery         Family History   Adopted: Yes   Problem Relation Age of Onset    Heart disease Mother     Pacemaker/defibrilator Brother     Heart disease Brother     Lung cancer Brother     Heart disease Maternal Aunt        Social History     Socioeconomic History    Marital status:    Tobacco Use    Smoking status: Former     Packs/day: 1.50     Years: 45.00     Pack years: 67.50     Types: Cigarettes     Quit date: 1995     Years since quittin.5    Smokeless tobacco: Never    Tobacco comments:     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   Substance and Sexual Activity    Alcohol use: No    Drug use: Never     Social Determinants of Health     Financial Resource Strain: Low Risk     Difficulty of Paying Living Expenses: Not hard at all   Food Insecurity: No Food Insecurity    Worried About Running Out of Food in the Last Year: Never true    Ran Out of Food in the Last Year: Never true   Transportation Needs: No Transportation Needs    Lack of Transportation (Medical): No    Lack of Transportation (Non-Medical): No   Physical Activity: Unknown    Days of Exercise per Week: Patient refused    Minutes of Exercise per Session: Patient refused   Stress: Unknown    Feeling of Stress : Patient refused   Social Connections: Unknown    Frequency of Communication with Friends and Family: More than three times a week    Frequency of Social Gatherings with Friends and Family: More than three times a week    Attends Zoroastrianism Services: Patient refused    Active Member of Clubs or Organizations: Patient refused    Attends Club or Organization Meetings: Patient  refused    Marital Status:    Housing Stability: Low Risk     Unable to Pay for Housing in the Last Year: No    Number of Places Lived in the Last Year: 1    Unstable Housing in the Last Year: No       Current Outpatient Medications   Medication Sig Dispense Refill    acarbose (PRECOSE) 25 MG Tab Take 25 mg by mouth 3 (three) times daily.      albuterol (VENTOLIN HFA) 90 mcg/actuation inhaler Inhale 2 puffs into the lungs every 6 (six) hours as needed for Wheezing. Rescue 18 g 0    azelastine (ASTELIN) 137 mcg (0.1 %) nasal spray 2 sprays by Nasal route 2 (two) times daily.  5    blood sugar diagnostic Strp 1 strip by Misc.(Non-Drug; Combo Route) route 3 (three) times daily as needed. 30 each 3    busPIRone (BUSPAR) 5 MG Tab Take 5 mg by mouth 2 (two) times daily.      clorazepate (TRANXENE) 3.75 MG Tab Take 7.5 mg by mouth 2 (two) times daily. 2 tablets      doxycycline (VIBRA-TABS) 100 MG tablet Take 1 tablet (100 mg total) by mouth 2 (two) times daily. 20 tablet 0    ergocalciferol (ERGOCALCIFEROL) 50,000 unit Cap Take 50,000 Units by mouth every Monday.      fluticasone (FLONASE) 50 mcg/actuation nasal spray 2 sprays by Each Nostril route Daily.  0    HYDROcodone-acetaminophen (NORCO)  mg per tablet Take 1 tablet by mouth every 4 (four) hours as needed for Pain. 30 tablet 0    montelukast (SINGULAIR) 10 mg tablet Take 10 mg by mouth once daily.  11    pantoprazole (PROTONIX) 40 MG tablet TAKE 1 TABLET BY MOUTH EVERY DAY (Patient taking differently: Take 40 mg by mouth once daily.) 90 tablet 3    triamcinolone acetonide 0.1% (KENALOG) 0.1 % cream Apply topically 2 (two) times daily. Apply to rash on on hand and ankle as needed      blood-glucose meter kit Use as instructed 1 each 0    levocetirizine (XYZAL) 5 MG tablet Take 1 tablet (5 mg total) by mouth every evening. 30 tablet 11     No current facility-administered medications for this visit.       Review of patient's allergies indicates:    Allergen Reactions    Pcn [penicillins]     Sulfa (sulfonamide antibiotics)          Review of Systems   Constitutional: Negative for chills, fever and malaise/fatigue.   HENT:  Negative for congestion.    Cardiovascular:  Negative for chest pain and claudication.   Respiratory:  Negative for cough and shortness of breath.    Skin:  Positive for color change.   Musculoskeletal:  Positive for muscle weakness.   Gastrointestinal:  Negative for nausea and vomiting.   Neurological:  Negative for numbness and paresthesias.         Objective:      Physical Exam  Constitutional:       General: She is not in acute distress.     Appearance: She is not ill-appearing.   Cardiovascular:      Pulses:           Dorsalis pedis pulses are 2+ on the right side.        Posterior tibial pulses are 2+ on the right side.   Musculoskeletal:      Comments: Rectus appearance the right 2nd toe with mild edema however it is dorsiflexed at the metatarsophalangeal joint but this is reducible.  Palpable bony enlargement of the dorsal 2nd met head.  No pain on palpation plantar to MTP and distal 2nd intermetatarsal space right foot.  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  Negative Lachman test right 2nd toe.  Reducible flexion adductovarus contracture right 2nd with medial deviation of the 4th toe.   Skin:     General: Skin is warm.      Capillary Refill: Capillary refill takes less than 2 seconds.      Findings: No erythema.      Nails: There is no clubbing.      Comments: Normal-appearing transverse scar dorsal right 2nd toe.  No signs of infection.   Neurological:      Mental Status: She is alert.             Assessment:       Encounter Diagnosis   Name Primary?    Contracture of toe, right Yes         Plan:       Autumn was seen today for follow-up.    Diagnoses and all orders for this visit:    Contracture of toe, right    I counseled the patient on her conditions, their implications and medical management.    Reviewed right  foot x-ray in detail.  No significant underlying osseous pathology involving the right 2nd toe or met head.  The right 2nd met head does appear to be slightly enlarged compared to the surrounding met heads.      We discussed continued conservative care such as using the crest pad, wearing shoes with extra-depth versus surgical intervention consisting of possible extensor tendon lengthening 2nd toe.  Risks and benefits were discussed.  Patient like to continue with conservative care at this time.  We also discussed that if her hallux abductovalgus progresses and further causes underlapping of the 2nd toe could lead to other problems.  Recommend monitoring this for now.    RTC p.r.n. as discussed.    A portion of this note was generated by voice recognition software and may contain spelling and grammar errors.      .

## 2023-03-09 ENCOUNTER — CLINICAL SUPPORT (OUTPATIENT)
Dept: CARDIOLOGY | Facility: HOSPITAL | Age: 78
End: 2023-03-09
Payer: MEDICARE

## 2023-03-09 DIAGNOSIS — Z95.818 PRESENCE OF OTHER CARDIAC IMPLANTS AND GRAFTS: ICD-10-CM

## 2023-03-09 PROCEDURE — 93298 REM INTERROG DEV EVAL SCRMS: CPT | Mod: ,,, | Performed by: INTERNAL MEDICINE

## 2023-03-09 PROCEDURE — G2066 INTER DEVC REMOTE 30D: HCPCS | Performed by: INTERNAL MEDICINE

## 2023-03-09 PROCEDURE — 93298 CARDIAC DEVICE CHECK - REMOTE: ICD-10-PCS | Mod: ,,, | Performed by: INTERNAL MEDICINE

## 2023-04-08 ENCOUNTER — CLINICAL SUPPORT (OUTPATIENT)
Dept: CARDIOLOGY | Facility: HOSPITAL | Age: 78
End: 2023-04-08
Payer: MEDICARE

## 2023-04-08 DIAGNOSIS — Z95.818 PRESENCE OF OTHER CARDIAC IMPLANTS AND GRAFTS: ICD-10-CM

## 2023-04-08 PROCEDURE — G2066 INTER DEVC REMOTE 30D: HCPCS | Performed by: INTERNAL MEDICINE

## 2023-05-08 ENCOUNTER — CLINICAL SUPPORT (OUTPATIENT)
Dept: CARDIOLOGY | Facility: HOSPITAL | Age: 78
End: 2023-05-08
Payer: MEDICARE

## 2023-05-08 DIAGNOSIS — Z95.818 PRESENCE OF OTHER CARDIAC IMPLANTS AND GRAFTS: ICD-10-CM

## 2023-05-08 PROCEDURE — 93298 REM INTERROG DEV EVAL SCRMS: CPT | Mod: ,,, | Performed by: INTERNAL MEDICINE

## 2023-05-08 PROCEDURE — 93298 CARDIAC DEVICE CHECK - REMOTE: ICD-10-PCS | Mod: ,,, | Performed by: INTERNAL MEDICINE

## 2023-05-08 PROCEDURE — G2066 INTER DEVC REMOTE 30D: HCPCS | Performed by: INTERNAL MEDICINE

## 2023-06-07 ENCOUNTER — CLINICAL SUPPORT (OUTPATIENT)
Dept: CARDIOLOGY | Facility: HOSPITAL | Age: 78
End: 2023-06-07
Payer: MEDICARE

## 2023-06-07 DIAGNOSIS — Z95.818 PRESENCE OF OTHER CARDIAC IMPLANTS AND GRAFTS: ICD-10-CM

## 2023-06-07 PROCEDURE — G2066 INTER DEVC REMOTE 30D: HCPCS | Performed by: INTERNAL MEDICINE

## 2023-07-07 ENCOUNTER — CLINICAL SUPPORT (OUTPATIENT)
Dept: CARDIOLOGY | Facility: HOSPITAL | Age: 78
End: 2023-07-07
Payer: MEDICARE

## 2023-07-07 DIAGNOSIS — Z95.818 PRESENCE OF OTHER CARDIAC IMPLANTS AND GRAFTS: ICD-10-CM

## 2023-07-07 PROCEDURE — G2066 INTER DEVC REMOTE 30D: HCPCS | Performed by: INTERNAL MEDICINE

## 2023-07-07 PROCEDURE — 93298 REM INTERROG DEV EVAL SCRMS: CPT | Mod: ,,, | Performed by: INTERNAL MEDICINE

## 2023-07-07 PROCEDURE — 93298 CARDIAC DEVICE CHECK - REMOTE: ICD-10-PCS | Mod: ,,, | Performed by: INTERNAL MEDICINE

## 2023-07-19 PROBLEM — N30.00 ACUTE CYSTITIS WITHOUT HEMATURIA: Status: ACTIVE | Noted: 2023-07-19

## 2023-08-06 ENCOUNTER — CLINICAL SUPPORT (OUTPATIENT)
Dept: CARDIOLOGY | Facility: HOSPITAL | Age: 78
End: 2023-08-06
Payer: MEDICARE

## 2023-08-06 DIAGNOSIS — Z95.818 PRESENCE OF OTHER CARDIAC IMPLANTS AND GRAFTS: ICD-10-CM

## 2023-08-06 PROCEDURE — G2066 INTER DEVC REMOTE 30D: HCPCS | Performed by: INTERNAL MEDICINE

## 2023-09-05 ENCOUNTER — CLINICAL SUPPORT (OUTPATIENT)
Dept: CARDIOLOGY | Facility: HOSPITAL | Age: 78
End: 2023-09-05
Payer: MEDICARE

## 2023-09-05 DIAGNOSIS — Z95.818 PRESENCE OF OTHER CARDIAC IMPLANTS AND GRAFTS: ICD-10-CM

## 2023-09-05 PROCEDURE — 93298 CARDIAC DEVICE CHECK - REMOTE: ICD-10-PCS | Mod: ,,, | Performed by: INTERNAL MEDICINE

## 2023-09-05 PROCEDURE — 93298 REM INTERROG DEV EVAL SCRMS: CPT | Mod: ,,, | Performed by: INTERNAL MEDICINE

## 2023-09-05 PROCEDURE — G2066 INTER DEVC REMOTE 30D: HCPCS | Performed by: INTERNAL MEDICINE

## 2023-09-08 ENCOUNTER — TELEPHONE (OUTPATIENT)
Dept: ELECTROPHYSIOLOGY | Facility: CLINIC | Age: 78
End: 2023-09-08

## 2023-09-08 NOTE — TELEPHONE ENCOUNTER
New onset atrial flutter noted during device remote check:    Overall burden:  0%    One episode 9/7/23 @ 0723 for 48 mins c/w AFL    Ventricular rates:   median v rate 111 bpm  Anticoagulation status:  none    Patient symptoms: TBD    Reviewed strip with Dr. Alvares. Orders received to have pt seen in clinic. Sent msg to MA staff to arrange.      ___        _

## 2023-09-11 ENCOUNTER — TELEPHONE (OUTPATIENT)
Dept: ELECTROPHYSIOLOGY | Facility: CLINIC | Age: 78
End: 2023-09-11
Payer: MEDICARE

## 2023-09-27 DIAGNOSIS — I48.0 PAROXYSMAL ATRIAL FIBRILLATION: Primary | ICD-10-CM

## 2023-10-05 ENCOUNTER — CLINICAL SUPPORT (OUTPATIENT)
Dept: CARDIOLOGY | Facility: HOSPITAL | Age: 78
End: 2023-10-05
Payer: MEDICARE

## 2023-10-05 DIAGNOSIS — Z95.818 PRESENCE OF OTHER CARDIAC IMPLANTS AND GRAFTS: ICD-10-CM

## 2023-10-05 PROCEDURE — G2066 INTER DEVC REMOTE 30D: HCPCS | Performed by: INTERNAL MEDICINE

## 2023-10-06 ENCOUNTER — TELEPHONE (OUTPATIENT)
Dept: ELECTROPHYSIOLOGY | Facility: CLINIC | Age: 78
End: 2023-10-06
Payer: MEDICARE

## 2023-10-20 ENCOUNTER — CLINICAL SUPPORT (OUTPATIENT)
Dept: CARDIOLOGY | Facility: HOSPITAL | Age: 78
End: 2023-10-20
Attending: INTERNAL MEDICINE
Payer: MEDICARE

## 2023-10-20 DIAGNOSIS — Z95.818 PRESENCE OF OTHER CARDIAC IMPLANTS AND GRAFTS: ICD-10-CM

## 2023-10-20 PROCEDURE — 93298 REM INTERROG DEV EVAL SCRMS: CPT | Mod: ,,, | Performed by: INTERNAL MEDICINE

## 2023-10-20 PROCEDURE — G2066 INTER DEVC REMOTE 30D: HCPCS | Performed by: INTERNAL MEDICINE

## 2023-10-20 PROCEDURE — 93298 CARDIAC DEVICE CHECK - REMOTE: ICD-10-PCS | Mod: ,,, | Performed by: INTERNAL MEDICINE

## 2023-11-03 ENCOUNTER — OFFICE VISIT (OUTPATIENT)
Dept: CARDIOLOGY | Facility: CLINIC | Age: 78
End: 2023-11-03
Payer: MEDICARE

## 2023-11-03 VITALS
BODY MASS INDEX: 21.62 KG/M2 | DIASTOLIC BLOOD PRESSURE: 74 MMHG | WEIGHT: 122 LBS | SYSTOLIC BLOOD PRESSURE: 112 MMHG | HEIGHT: 63 IN | HEART RATE: 84 BPM

## 2023-11-03 DIAGNOSIS — R55 SYNCOPE, UNSPECIFIED SYNCOPE TYPE: ICD-10-CM

## 2023-11-03 DIAGNOSIS — I10 ESSENTIAL HYPERTENSION: ICD-10-CM

## 2023-11-03 DIAGNOSIS — I48.0 PAROXYSMAL ATRIAL FIBRILLATION: Primary | ICD-10-CM

## 2023-11-03 PROCEDURE — 1160F RVW MEDS BY RX/DR IN RCRD: CPT | Mod: CPTII,S$GLB,, | Performed by: INTERNAL MEDICINE

## 2023-11-03 PROCEDURE — 93010 ELECTROCARDIOGRAM REPORT: CPT | Mod: S$GLB,,, | Performed by: INTERNAL MEDICINE

## 2023-11-03 PROCEDURE — 3288F FALL RISK ASSESSMENT DOCD: CPT | Mod: CPTII,S$GLB,, | Performed by: INTERNAL MEDICINE

## 2023-11-03 PROCEDURE — 99999 PR PBB SHADOW E&M-EST. PATIENT-LVL III: ICD-10-PCS | Mod: PBBFAC,,, | Performed by: INTERNAL MEDICINE

## 2023-11-03 PROCEDURE — 1126F PR PAIN SEVERITY QUANTIFIED, NO PAIN PRESENT: ICD-10-PCS | Mod: CPTII,S$GLB,, | Performed by: INTERNAL MEDICINE

## 2023-11-03 PROCEDURE — 1126F AMNT PAIN NOTED NONE PRSNT: CPT | Mod: CPTII,S$GLB,, | Performed by: INTERNAL MEDICINE

## 2023-11-03 PROCEDURE — 3074F SYST BP LT 130 MM HG: CPT | Mod: CPTII,S$GLB,, | Performed by: INTERNAL MEDICINE

## 2023-11-03 PROCEDURE — 1100F PR PT FALLS ASSESS DOC 2+ FALLS/FALL W/INJURY/YR: ICD-10-PCS | Mod: CPTII,S$GLB,, | Performed by: INTERNAL MEDICINE

## 2023-11-03 PROCEDURE — 1160F PR REVIEW ALL MEDS BY PRESCRIBER/CLIN PHARMACIST DOCUMENTED: ICD-10-PCS | Mod: CPTII,S$GLB,, | Performed by: INTERNAL MEDICINE

## 2023-11-03 PROCEDURE — 3288F PR FALLS RISK ASSESSMENT DOCUMENTED: ICD-10-PCS | Mod: CPTII,S$GLB,, | Performed by: INTERNAL MEDICINE

## 2023-11-03 PROCEDURE — 1100F PTFALLS ASSESS-DOCD GE2>/YR: CPT | Mod: CPTII,S$GLB,, | Performed by: INTERNAL MEDICINE

## 2023-11-03 PROCEDURE — 3074F PR MOST RECENT SYSTOLIC BLOOD PRESSURE < 130 MM HG: ICD-10-PCS | Mod: CPTII,S$GLB,, | Performed by: INTERNAL MEDICINE

## 2023-11-03 PROCEDURE — 99999 PR PBB SHADOW E&M-EST. PATIENT-LVL III: CPT | Mod: PBBFAC,,, | Performed by: INTERNAL MEDICINE

## 2023-11-03 PROCEDURE — 93005 ELECTROCARDIOGRAM TRACING: CPT | Mod: S$GLB,,, | Performed by: INTERNAL MEDICINE

## 2023-11-03 PROCEDURE — 1159F PR MEDICATION LIST DOCUMENTED IN MEDICAL RECORD: ICD-10-PCS | Mod: CPTII,S$GLB,, | Performed by: INTERNAL MEDICINE

## 2023-11-03 PROCEDURE — 93005 RHYTHM STRIP: ICD-10-PCS | Mod: S$GLB,,, | Performed by: INTERNAL MEDICINE

## 2023-11-03 PROCEDURE — 99214 PR OFFICE/OUTPT VISIT, EST, LEVL IV, 30-39 MIN: ICD-10-PCS | Mod: S$GLB,,, | Performed by: INTERNAL MEDICINE

## 2023-11-03 PROCEDURE — 1159F MED LIST DOCD IN RCRD: CPT | Mod: CPTII,S$GLB,, | Performed by: INTERNAL MEDICINE

## 2023-11-03 PROCEDURE — 93010 RHYTHM STRIP: ICD-10-PCS | Mod: S$GLB,,, | Performed by: INTERNAL MEDICINE

## 2023-11-03 PROCEDURE — 3078F PR MOST RECENT DIASTOLIC BLOOD PRESSURE < 80 MM HG: ICD-10-PCS | Mod: CPTII,S$GLB,, | Performed by: INTERNAL MEDICINE

## 2023-11-03 PROCEDURE — 3078F DIAST BP <80 MM HG: CPT | Mod: CPTII,S$GLB,, | Performed by: INTERNAL MEDICINE

## 2023-11-03 PROCEDURE — 99214 OFFICE O/P EST MOD 30 MIN: CPT | Mod: S$GLB,,, | Performed by: INTERNAL MEDICINE

## 2023-11-03 NOTE — PROGRESS NOTES
Subjective:    Patient ID:  Autumn Harris is a 78 y.o. female who presents for evaluation of Atrial Fibrillation    Referring Cardiologist: Arjun Horn MD    HPI  I had the pleasure of seeing Mrs. Harrsi today in our electrophysiology clinic in follow-up for her syncope. As you are aware she is a 78 year-old woman with hypertension and syncope. She was admitted to [facility] in 2017 for an episode of near syncope that was felt to be vasovagal (light-headedness with sudden urge to have a bowel movement) and volume depletion in etiology. She then presented to the ER on 6/27/2021 for an episode of syncope. She was sitting on the toilet, talking with her daughter on the phone, began repeating herself, then passed out. She somehow made it to her bed however and did not remember how she got there from the bathroom. Work up in ER was unremarkable (was quite hypertensive however). She saw Dr. Horn in follow-up. A holter monitor noted frequent PACs (1.6% burden) and runs of nonsustained AT up to 13 beats in duration. Average sinus rate was 73 bpm.  After discussion she elected for ILR implant (done in 2021)    A stress echo from 2019 noted normal LV function and no ischemia.    Interim Hx:  Mrs. Harris returns for follow-up. Recently observed to have new onset pAF (had one episode lasting 47 minutes). She was asymptomatic. Had to have emergency gall bladder surgery in June 2023 while on vacation in [place]. Recent ECHO at [facility] noted normal LV function.    I reviewed available ECGs in Epic which show sinus rhythm with a narrow QRS.    Review of Systems   Constitutional: Negative for fever and malaise/fatigue.   HENT:  Negative for congestion and sore throat.    Eyes:  Negative for blurred vision and visual disturbance.   Cardiovascular:  Positive for syncope. Negative for chest pain, dyspnea on exertion, irregular heartbeat, near-syncope and palpitations.   Respiratory:  Negative for cough and shortness of  breath.    Hematologic/Lymphatic: Negative for bleeding problem. Does not bruise/bleed easily.   Skin: Negative.    Musculoskeletal: Negative.    Gastrointestinal:  Negative for bloating, abdominal pain, hematochezia and melena.   Neurological:  Negative for focal weakness and weakness.   Psychiatric/Behavioral: Negative.          Objective:    Physical Exam  Vitals reviewed.   Constitutional:       General: She is not in acute distress.     Appearance: She is well-developed. She is not diaphoretic.   HENT:      Head: Normocephalic and atraumatic.   Eyes:      General:         Right eye: No discharge.         Left eye: No discharge.      Conjunctiva/sclera: Conjunctivae normal.   Cardiovascular:      Rate and Rhythm: Normal rate and regular rhythm.      Heart sounds: No murmur heard.     No friction rub. No gallop.   Pulmonary:      Effort: Pulmonary effort is normal. No respiratory distress.      Breath sounds: Normal breath sounds. No wheezing or rales.   Abdominal:      General: Bowel sounds are normal. There is no distension.      Palpations: Abdomen is soft.      Tenderness: There is no abdominal tenderness.   Musculoskeletal:      Cervical back: Neck supple.   Skin:     General: Skin is warm and dry.   Neurological:      Mental Status: She is alert and oriented to person, place, and time.   Psychiatric:         Behavior: Behavior normal.         Thought Content: Thought content normal.         Judgment: Judgment normal.           Assessment:       1. Paroxysmal atrial fibrillation    2. Essential hypertension    3. Syncope, unspecified syncope type         Plan:       In summary, Mrs. Harris is a 78 year-old woman with hypertension and syncope, possibly vasovagal, s/p ILR with new onset asymptomatic device detected AF. I had a long discussion with the patient about the pathophysiology and risks of atrial fibrillation and its basic pathophysiology, including its health implications and treatment options.  Specifically, I addressed the need for CVA (stroke) prophylaxis. Discussed in setting of asymptomatic device detected AF of a low burden I would not recommend anticoagulation yet. She has had only a single 45 minute episode. Discussed ILR replacement however when this battery runs out. She understood.    Thank you for allowing me to participate in the care of this patient. Please do not hesitate to call me with any questions or concerns.    Cristhian Alvares MD, PhD  Cardiac Electrophysiology

## 2023-11-14 LAB
OHS CV AF BURDEN PERCENT: < 1
OHS CV DC REMOTE DEVICE TYPE: NORMAL

## 2023-12-01 ENCOUNTER — CLINICAL SUPPORT (OUTPATIENT)
Dept: CARDIOLOGY | Facility: HOSPITAL | Age: 78
End: 2023-12-01
Attending: INTERNAL MEDICINE
Payer: MEDICARE

## 2023-12-01 ENCOUNTER — CLINICAL SUPPORT (OUTPATIENT)
Dept: CARDIOLOGY | Facility: HOSPITAL | Age: 78
End: 2023-12-01
Payer: MEDICARE

## 2023-12-01 DIAGNOSIS — Z95.818 PRESENCE OF OTHER CARDIAC IMPLANTS AND GRAFTS: ICD-10-CM

## 2023-12-01 PROCEDURE — G2066 INTER DEVC REMOTE 30D: HCPCS | Performed by: INTERNAL MEDICINE

## 2023-12-01 PROCEDURE — 93298 CARDIAC DEVICE CHECK - REMOTE: ICD-10-PCS | Mod: ,,, | Performed by: INTERNAL MEDICINE

## 2023-12-01 PROCEDURE — 93298 REM INTERROG DEV EVAL SCRMS: CPT | Mod: ,,, | Performed by: INTERNAL MEDICINE

## 2023-12-08 LAB
OHS CV AF BURDEN PERCENT: < 1
OHS CV DC REMOTE DEVICE TYPE: NORMAL

## 2023-12-15 ENCOUNTER — CLINICAL SUPPORT (OUTPATIENT)
Dept: CARDIOLOGY | Facility: HOSPITAL | Age: 78
End: 2023-12-15

## 2023-12-15 DIAGNOSIS — Z95.818 PRESENCE OF OTHER CARDIAC IMPLANTS AND GRAFTS: ICD-10-CM

## 2024-01-01 ENCOUNTER — CLINICAL SUPPORT (OUTPATIENT)
Dept: CARDIOLOGY | Facility: HOSPITAL | Age: 79
End: 2024-01-01
Attending: INTERNAL MEDICINE
Payer: MEDICARE

## 2024-01-01 DIAGNOSIS — Z95.818 PRESENCE OF OTHER CARDIAC IMPLANTS AND GRAFTS: ICD-10-CM

## 2024-01-01 PROCEDURE — 93298 REM INTERROG DEV EVAL SCRMS: CPT | Mod: 26,,, | Performed by: INTERNAL MEDICINE

## 2024-01-04 DIAGNOSIS — M81.0 SENILE OSTEOPOROSIS: ICD-10-CM

## 2024-01-04 DIAGNOSIS — Z12.31 SCREENING MAMMOGRAM, ENCOUNTER FOR: Primary | ICD-10-CM

## 2024-01-10 ENCOUNTER — HOSPITAL ENCOUNTER (OUTPATIENT)
Dept: RADIOLOGY | Facility: HOSPITAL | Age: 79
Discharge: HOME | End: 2024-01-10
Attending: INTERNAL MEDICINE
Payer: MEDICARE

## 2024-01-10 DIAGNOSIS — M81.0 SENILE OSTEOPOROSIS: ICD-10-CM

## 2024-01-10 DIAGNOSIS — Z12.31 SCREENING MAMMOGRAM, ENCOUNTER FOR: ICD-10-CM

## 2024-01-10 PROCEDURE — 77080 DXA BONE DENSITY AXIAL: CPT | Mod: TC

## 2024-01-10 PROCEDURE — 77063 BREAST TOMOSYNTHESIS BI: CPT | Mod: 26,,, | Performed by: RADIOLOGY

## 2024-01-10 PROCEDURE — 77080 DXA BONE DENSITY AXIAL: CPT | Mod: 26,,, | Performed by: RADIOLOGY

## 2024-01-10 PROCEDURE — 77067 SCR MAMMO BI INCL CAD: CPT | Mod: 26,,, | Performed by: RADIOLOGY

## 2024-01-10 PROCEDURE — 77067 SCR MAMMO BI INCL CAD: CPT | Mod: TC

## 2024-01-14 ENCOUNTER — CLINICAL SUPPORT (OUTPATIENT)
Dept: CARDIOLOGY | Facility: HOSPITAL | Age: 79
End: 2024-01-14

## 2024-01-14 DIAGNOSIS — Z95.818 PRESENCE OF OTHER CARDIAC IMPLANTS AND GRAFTS: ICD-10-CM

## 2024-01-17 LAB
OHS CV AF BURDEN PERCENT: < 1
OHS CV DC REMOTE DEVICE TYPE: NORMAL

## 2024-02-01 ENCOUNTER — CLINICAL SUPPORT (OUTPATIENT)
Dept: CARDIOLOGY | Facility: HOSPITAL | Age: 79
End: 2024-02-01
Attending: INTERNAL MEDICINE
Payer: MEDICARE

## 2024-02-01 DIAGNOSIS — Z95.818 PRESENCE OF OTHER CARDIAC IMPLANTS AND GRAFTS: ICD-10-CM

## 2024-02-01 PROCEDURE — 93298 REM INTERROG DEV EVAL SCRMS: CPT | Mod: 26,,, | Performed by: INTERNAL MEDICINE

## 2024-02-13 ENCOUNTER — CLINICAL SUPPORT (OUTPATIENT)
Dept: CARDIOLOGY | Facility: HOSPITAL | Age: 79
End: 2024-02-13

## 2024-02-13 DIAGNOSIS — Z95.818 PRESENCE OF OTHER CARDIAC IMPLANTS AND GRAFTS: ICD-10-CM

## 2024-02-26 ENCOUNTER — NURSE TRIAGE (OUTPATIENT)
Dept: ADMINISTRATIVE | Facility: CLINIC | Age: 79
End: 2024-02-26
Payer: MEDICARE

## 2024-02-26 NOTE — TELEPHONE ENCOUNTER
"Pt calling with c/o SOB and was worse when walking and that she thought it was anxiety yesterday and not having trouble today. Pt hasn't seen Dr in a year and wanted to make a clinic appt and I triaged and care advice to go to office now and PCP is non [facility] and she doesn't want to go since feeling better. Pt told I have to recommend that she be see and to call back as needed                    Reason for Disposition   MILD difficulty breathing (e.g., minimal/no SOB at rest, SOB with walking, pulse < 100) of new-onset or worse than normal    Additional Information   Negative: SEVERE difficulty breathing (e.g., struggling for each breath, speaks in single words, pulse > 120)   Negative: Breathing stopped and hasn't returned   Negative: Choking on something   Negative: Bluish (or gray) lips or face   Negative: Difficult to awaken or acting confused (e.g., disoriented, slurred speech)   Negative: Passed out (i.e., fainted, collapsed and was not responding)   Negative: Wheezing started suddenly after medicine, an allergic food, or bee sting   Negative: Stridor (harsh sound while breathing in)   Negative: Slow, shallow and weak breathing   Negative: Sounds like a life-threatening emergency to the triager   Negative: Long-distance travel in past month (e.g., car, bus, train, plane; with trip lasting 6 or more hours)   Negative: Cancer treatment in past six months (or has cancer now)   Negative: Major surgery in the past month   Negative: Any history of prior "blood clot" in leg or lungs   Negative: Hip or leg fracture (broken bone) in past month (or had cast on leg or ankle in past month)   Negative: Wheezing can be heard across the room   Negative: Drooling or spitting out saliva (because can't swallow)   Negative: Oxygen level (e.g., pulse oximetry) 90% or lower   Negative: Extra heartbeats, irregular heart beating, or heart is beating very fast (i.e., 'palpitations')   Negative: MODERATE difficulty breathing (e.g., " speaks in phrases, SOB even at rest, pulse 100-120) of new-onset or worse than normal   Negative: Illness requiring prolonged bedrest in past month (e.g., immobilization, long hospital stay)   Negative: Fever > 103 F (39.4 C)   Negative: Fever > 101 F (38.3 C) and over 60 years of age   Negative: Fever > 100.0 F (37.8 C) and bedridden (e.g., nursing home patient, stroke, chronic illness, recovering from surgery)   Negative: Fever > 100.0 F (37.8 C) and diabetes mellitus or weak immune system (e.g., HIV positive, cancer chemo, splenectomy, organ transplant, chronic steroids)   Negative: Periods where breathing stops and then resumes normally and bedridden (e.g., nursing home patient, CVA)   Negative: Pregnant or postpartum (from 0 to 6 weeks after delivery)   Negative: Patient sounds very sick or weak to the triager    Protocols used: Breathing Difficulty-A-[place]

## 2024-02-28 LAB
OHS CV AF BURDEN PERCENT: < 1
OHS CV DC REMOTE DEVICE TYPE: NORMAL

## 2024-03-03 ENCOUNTER — CLINICAL SUPPORT (OUTPATIENT)
Dept: CARDIOLOGY | Facility: HOSPITAL | Age: 79
End: 2024-03-03
Attending: INTERNAL MEDICINE
Payer: MEDICARE

## 2024-03-03 DIAGNOSIS — Z95.818 PRESENCE OF OTHER CARDIAC IMPLANTS AND GRAFTS: ICD-10-CM

## 2024-03-03 PROCEDURE — 93298 REM INTERROG DEV EVAL SCRMS: CPT | Mod: 26,,, | Performed by: INTERNAL MEDICINE

## 2024-03-12 RX ORDER — PANTOPRAZOLE SODIUM 40 MG/1
TABLET, DELAYED RELEASE ORAL
Qty: 90 TABLET | Refills: 3 | Status: SHIPPED | OUTPATIENT
Start: 2024-03-12

## 2024-03-21 ENCOUNTER — TELEPHONE (OUTPATIENT)
Dept: CARDIOLOGY | Facility: CLINIC | Age: 79
End: 2024-03-21
Payer: MEDICARE

## 2024-03-21 ENCOUNTER — HOSPITAL ENCOUNTER (OUTPATIENT)
Facility: HOSPITAL | Age: 79
Discharge: HOME | End: 2024-03-22
Attending: EMERGENCY MEDICINE | Admitting: INTERNAL MEDICINE
Payer: MEDICARE

## 2024-03-21 DIAGNOSIS — R11.2 NAUSEA AND VOMITING, UNSPECIFIED VOMITING TYPE: ICD-10-CM

## 2024-03-21 DIAGNOSIS — R53.1 WEAKNESS: Primary | ICD-10-CM

## 2024-03-21 DIAGNOSIS — R07.9 CHEST PAIN: ICD-10-CM

## 2024-03-21 DIAGNOSIS — R06.02 SOB (SHORTNESS OF BREATH): ICD-10-CM

## 2024-03-21 DIAGNOSIS — N30.00 ACUTE CYSTITIS WITHOUT HEMATURIA: ICD-10-CM

## 2024-03-21 PROBLEM — R71.8 MICROCYTOSIS: Status: ACTIVE | Noted: 2024-03-21

## 2024-03-21 LAB
ALBUMIN SERPL BCP-MCNC: 3.8 G/DL (ref 3.5–5.2)
ALP SERPL-CCNC: 108 U/L (ref 55–135)
ALT SERPL W/O P-5'-P-CCNC: 20 U/L (ref 10–44)
ANION GAP SERPL CALC-SCNC: 15 MMOL/L (ref 8–16)
AST SERPL-CCNC: 33 U/L (ref 10–40)
BACTERIA #/AREA URNS HPF: ABNORMAL /HPF
BASOPHILS # BLD AUTO: 0.11 K/UL (ref 0–0.2)
BASOPHILS NFR BLD: 1.5 % (ref 0–1.9)
BILIRUB SERPL-MCNC: 0.2 MG/DL (ref 0.1–1)
BILIRUB UR QL STRIP: NEGATIVE
BNP SERPL-MCNC: 119 PG/ML (ref 0–99)
BUN SERPL-MCNC: 19 MG/DL (ref 8–23)
CALCIUM SERPL-MCNC: 9.9 MG/DL (ref 8.7–10.5)
CHLORIDE SERPL-SCNC: 102 MMOL/L (ref 95–110)
CLARITY UR: CLEAR
CO2 SERPL-SCNC: 20 MMOL/L (ref 23–29)
COLOR UR: YELLOW
CREAT SERPL-MCNC: 1.4 MG/DL (ref 0.5–1.4)
CRP SERPL-MCNC: 8.2 MG/L (ref 0–8.2)
DIFFERENTIAL METHOD BLD: ABNORMAL
EOSINOPHIL # BLD AUTO: 0.4 K/UL (ref 0–0.5)
EOSINOPHIL NFR BLD: 5 % (ref 0–8)
ERYTHROCYTE [DISTWIDTH] IN BLOOD BY AUTOMATED COUNT: 17.4 % (ref 11.5–14.5)
EST. GFR  (NO RACE VARIABLE): 39 ML/MIN/1.73 M^2
FERRITIN SERPL-MCNC: 18 NG/ML (ref 20–300)
FOLATE SERPL-MCNC: 11.9 NG/ML (ref 4–24)
GLUCOSE SERPL-MCNC: 77 MG/DL (ref 70–110)
GLUCOSE UR QL STRIP: NEGATIVE
HCT VFR BLD AUTO: 39.2 % (ref 37–48.5)
HGB BLD-MCNC: 12.1 G/DL (ref 12–16)
HGB UR QL STRIP: NEGATIVE
IMM GRANULOCYTES # BLD AUTO: 0.01 K/UL (ref 0–0.04)
IMM GRANULOCYTES NFR BLD AUTO: 0.1 % (ref 0–0.5)
INFLUENZA A, MOLECULAR: NEGATIVE
INFLUENZA B, MOLECULAR: NEGATIVE
IRON SERPL-MCNC: 24 UG/DL (ref 30–160)
KETONES UR QL STRIP: NEGATIVE
LEUKOCYTE ESTERASE UR QL STRIP: ABNORMAL
LYMPHOCYTES # BLD AUTO: 2.4 K/UL (ref 1–4.8)
LYMPHOCYTES NFR BLD: 31.1 % (ref 18–48)
MCH RBC QN AUTO: 23.6 PG (ref 27–31)
MCHC RBC AUTO-ENTMCNC: 30.9 G/DL (ref 32–36)
MCV RBC AUTO: 77 FL (ref 82–98)
MICROSCOPIC COMMENT: ABNORMAL
MONOCYTES # BLD AUTO: 1 K/UL (ref 0.3–1)
MONOCYTES NFR BLD: 12.6 % (ref 4–15)
NEUTROPHILS # BLD AUTO: 3.8 K/UL (ref 1.8–7.7)
NEUTROPHILS NFR BLD: 49.7 % (ref 38–73)
NITRITE UR QL STRIP: POSITIVE
NRBC BLD-RTO: 0 /100 WBC
OHS QRS DURATION: 86 MS
OHS QTC CALCULATION: 452 MS
PH UR STRIP: 6 [PH] (ref 5–8)
PLATELET # BLD AUTO: 300 K/UL (ref 150–450)
PMV BLD AUTO: 10.4 FL (ref 9.2–12.9)
POTASSIUM SERPL-SCNC: 4.2 MMOL/L (ref 3.5–5.1)
PROT SERPL-MCNC: 8 G/DL (ref 6–8.4)
PROT UR QL STRIP: NEGATIVE
RBC # BLD AUTO: 5.12 M/UL (ref 4–5.4)
RBC #/AREA URNS HPF: 1 /HPF (ref 0–4)
SARS-COV-2 RDRP RESP QL NAA+PROBE: NEGATIVE
SATURATED IRON: 5 % (ref 20–50)
SODIUM SERPL-SCNC: 137 MMOL/L (ref 136–145)
SP GR UR STRIP: 1.01 (ref 1–1.03)
SPECIMEN SOURCE: NORMAL
SQUAMOUS #/AREA URNS HPF: 0 /HPF
TOTAL IRON BINDING CAPACITY: 480 UG/DL (ref 250–450)
TRANSFERRIN SERPL-MCNC: 324 MG/DL (ref 200–375)
TROPONIN I SERPL DL<=0.01 NG/ML-MCNC: 0.01 NG/ML (ref 0–0.03)
TROPONIN I SERPL DL<=0.01 NG/ML-MCNC: 0.04 NG/ML (ref 0–0.03)
TROPONIN I SERPL DL<=0.01 NG/ML-MCNC: <0.006 NG/ML (ref 0–0.03)
URN SPEC COLLECT METH UR: ABNORMAL
UROBILINOGEN UR STRIP-ACNC: NEGATIVE EU/DL
VIT B12 SERPL-MCNC: 579 PG/ML (ref 210–950)
WBC # BLD AUTO: 7.56 K/UL (ref 3.9–12.7)
WBC #/AREA URNS HPF: 16 /HPF (ref 0–5)

## 2024-03-21 PROCEDURE — 86140 C-REACTIVE PROTEIN: CPT | Performed by: STUDENT IN AN ORGANIZED HEALTH CARE EDUCATION/TRAINING PROGRAM

## 2024-03-21 PROCEDURE — G0378 HOSPITAL OBSERVATION PER HR: HCPCS

## 2024-03-21 PROCEDURE — 93005 ELECTROCARDIOGRAM TRACING: CPT

## 2024-03-21 PROCEDURE — 99285 EMERGENCY DEPT VISIT HI MDM: CPT | Mod: 25

## 2024-03-21 PROCEDURE — 87502 INFLUENZA DNA AMP PROBE: CPT | Performed by: NURSE PRACTITIONER

## 2024-03-21 PROCEDURE — 25000003 PHARM REV CODE 250: Performed by: STUDENT IN AN ORGANIZED HEALTH CARE EDUCATION/TRAINING PROGRAM

## 2024-03-21 PROCEDURE — 96361 HYDRATE IV INFUSION ADD-ON: CPT

## 2024-03-21 PROCEDURE — 99900035 HC TECH TIME PER 15 MIN (STAT)

## 2024-03-21 PROCEDURE — 83880 ASSAY OF NATRIURETIC PEPTIDE: CPT | Performed by: NURSE PRACTITIONER

## 2024-03-21 PROCEDURE — 84484 ASSAY OF TROPONIN QUANT: CPT | Mod: 91 | Performed by: STUDENT IN AN ORGANIZED HEALTH CARE EDUCATION/TRAINING PROGRAM

## 2024-03-21 PROCEDURE — 93010 ELECTROCARDIOGRAM REPORT: CPT | Mod: 76,,, | Performed by: INTERNAL MEDICINE

## 2024-03-21 PROCEDURE — 87077 CULTURE AEROBIC IDENTIFY: CPT | Performed by: NURSE PRACTITIONER

## 2024-03-21 PROCEDURE — U0002 COVID-19 LAB TEST NON-CDC: HCPCS | Performed by: NURSE PRACTITIONER

## 2024-03-21 PROCEDURE — 25000003 PHARM REV CODE 250: Performed by: NURSE PRACTITIONER

## 2024-03-21 PROCEDURE — 25500020 PHARM REV CODE 255: Performed by: NURSE PRACTITIONER

## 2024-03-21 PROCEDURE — 25000003 PHARM REV CODE 250

## 2024-03-21 PROCEDURE — 87088 URINE BACTERIA CULTURE: CPT | Performed by: NURSE PRACTITIONER

## 2024-03-21 PROCEDURE — 81000 URINALYSIS NONAUTO W/SCOPE: CPT | Performed by: NURSE PRACTITIONER

## 2024-03-21 PROCEDURE — 82607 VITAMIN B-12: CPT | Performed by: STUDENT IN AN ORGANIZED HEALTH CARE EDUCATION/TRAINING PROGRAM

## 2024-03-21 PROCEDURE — 80053 COMPREHEN METABOLIC PANEL: CPT | Performed by: NURSE PRACTITIONER

## 2024-03-21 PROCEDURE — 87086 URINE CULTURE/COLONY COUNT: CPT | Performed by: NURSE PRACTITIONER

## 2024-03-21 PROCEDURE — 63600175 PHARM REV CODE 636 W HCPCS: Performed by: NURSE PRACTITIONER

## 2024-03-21 PROCEDURE — 25000003 PHARM REV CODE 250: Performed by: INTERNAL MEDICINE

## 2024-03-21 PROCEDURE — 82728 ASSAY OF FERRITIN: CPT | Performed by: STUDENT IN AN ORGANIZED HEALTH CARE EDUCATION/TRAINING PROGRAM

## 2024-03-21 PROCEDURE — 87186 SC STD MICRODIL/AGAR DIL: CPT | Performed by: NURSE PRACTITIONER

## 2024-03-21 PROCEDURE — 83540 ASSAY OF IRON: CPT | Performed by: STUDENT IN AN ORGANIZED HEALTH CARE EDUCATION/TRAINING PROGRAM

## 2024-03-21 PROCEDURE — 85025 COMPLETE CBC W/AUTO DIFF WBC: CPT | Performed by: NURSE PRACTITIONER

## 2024-03-21 PROCEDURE — 93010 ELECTROCARDIOGRAM REPORT: CPT | Mod: ,,, | Performed by: INTERNAL MEDICINE

## 2024-03-21 PROCEDURE — 82746 ASSAY OF FOLIC ACID SERUM: CPT | Performed by: STUDENT IN AN ORGANIZED HEALTH CARE EDUCATION/TRAINING PROGRAM

## 2024-03-21 PROCEDURE — 84484 ASSAY OF TROPONIN QUANT: CPT | Performed by: NURSE PRACTITIONER

## 2024-03-21 PROCEDURE — 96374 THER/PROPH/DIAG INJ IV PUSH: CPT | Mod: 59

## 2024-03-21 RX ORDER — CLORAZEPATE DIPOTASSIUM 3.75 MG/1
7.5 TABLET ORAL 2 TIMES DAILY
Status: DISCONTINUED | OUTPATIENT
Start: 2024-03-21 | End: 2024-03-21

## 2024-03-21 RX ORDER — GLUCAGON 1 MG
1 KIT INJECTION
Status: DISCONTINUED | OUTPATIENT
Start: 2024-03-21 | End: 2024-03-22 | Stop reason: HOSPADM

## 2024-03-21 RX ORDER — ACETAMINOPHEN 325 MG/1
650 TABLET ORAL EVERY 4 HOURS PRN
Status: DISCONTINUED | OUTPATIENT
Start: 2024-03-21 | End: 2024-03-22 | Stop reason: HOSPADM

## 2024-03-21 RX ORDER — ATORVASTATIN CALCIUM 40 MG/1
40 TABLET, FILM COATED ORAL NIGHTLY
Status: DISCONTINUED | OUTPATIENT
Start: 2024-03-21 | End: 2024-03-22 | Stop reason: HOSPADM

## 2024-03-21 RX ORDER — ACETAMINOPHEN 500 MG
1000 TABLET ORAL
Status: COMPLETED | OUTPATIENT
Start: 2024-03-21 | End: 2024-03-21

## 2024-03-21 RX ORDER — ONDANSETRON HYDROCHLORIDE 2 MG/ML
4 INJECTION, SOLUTION INTRAVENOUS
Status: COMPLETED | OUTPATIENT
Start: 2024-03-21 | End: 2024-03-21

## 2024-03-21 RX ORDER — AZELASTINE 1 MG/ML
2 SPRAY, METERED NASAL 2 TIMES DAILY
Status: DISCONTINUED | OUTPATIENT
Start: 2024-03-21 | End: 2024-03-21

## 2024-03-21 RX ORDER — IBUPROFEN 200 MG
16 TABLET ORAL
Status: DISCONTINUED | OUTPATIENT
Start: 2024-03-21 | End: 2024-03-22 | Stop reason: HOSPADM

## 2024-03-21 RX ORDER — CIPROFLOXACIN 250 MG/1
250 TABLET, FILM COATED ORAL EVERY 12 HOURS
Status: DISCONTINUED | OUTPATIENT
Start: 2024-03-21 | End: 2024-03-21

## 2024-03-21 RX ORDER — CLORAZEPATE DIPOTASSIUM 3.75 MG/1
3.75 TABLET ORAL 2 TIMES DAILY
Status: DISCONTINUED | OUTPATIENT
Start: 2024-03-21 | End: 2024-03-22 | Stop reason: HOSPADM

## 2024-03-21 RX ORDER — MONTELUKAST SODIUM 10 MG/1
10 TABLET ORAL DAILY
COMMUNITY
Start: 2024-03-11 | End: 2024-03-28

## 2024-03-21 RX ORDER — AZELASTINE 1 MG/ML
2 SPRAY, METERED NASAL 2 TIMES DAILY
Status: DISCONTINUED | OUTPATIENT
Start: 2024-03-22 | End: 2024-03-22 | Stop reason: HOSPADM

## 2024-03-21 RX ORDER — NALOXONE HCL 0.4 MG/ML
0.02 VIAL (ML) INJECTION
Status: DISCONTINUED | OUTPATIENT
Start: 2024-03-21 | End: 2024-03-22 | Stop reason: HOSPADM

## 2024-03-21 RX ORDER — BUSPIRONE HYDROCHLORIDE 5 MG/1
5 TABLET ORAL 2 TIMES DAILY
Status: DISCONTINUED | OUTPATIENT
Start: 2024-03-21 | End: 2024-03-22 | Stop reason: HOSPADM

## 2024-03-21 RX ORDER — ENOXAPARIN SODIUM 100 MG/ML
40 INJECTION SUBCUTANEOUS EVERY 24 HOURS
Status: DISCONTINUED | OUTPATIENT
Start: 2024-03-22 | End: 2024-03-21

## 2024-03-21 RX ORDER — ONDANSETRON 8 MG/1
8 TABLET, ORALLY DISINTEGRATING ORAL EVERY 8 HOURS PRN
Status: DISCONTINUED | OUTPATIENT
Start: 2024-03-21 | End: 2024-03-22 | Stop reason: HOSPADM

## 2024-03-21 RX ORDER — TRAMADOL HYDROCHLORIDE AND ACETAMINOPHEN 37.5; 325 MG/1; MG/1
1 TABLET, FILM COATED ORAL 3 TIMES DAILY
COMMUNITY
Start: 2024-02-28

## 2024-03-21 RX ORDER — ASPIRIN 325 MG
325 TABLET ORAL ONCE
Status: COMPLETED | OUTPATIENT
Start: 2024-03-21 | End: 2024-03-21

## 2024-03-21 RX ORDER — CLORAZEPATE DIPOTASSIUM 3.75 MG/1
3.75 TABLET ORAL 2 TIMES DAILY
Status: DISCONTINUED | OUTPATIENT
Start: 2024-03-22 | End: 2024-03-21

## 2024-03-21 RX ORDER — LEVOFLOXACIN 250 MG/1
250 TABLET ORAL DAILY
Status: DISCONTINUED | OUTPATIENT
Start: 2024-03-21 | End: 2024-03-21

## 2024-03-21 RX ORDER — ENOXAPARIN SODIUM 100 MG/ML
30 INJECTION SUBCUTANEOUS EVERY 24 HOURS
Status: DISCONTINUED | OUTPATIENT
Start: 2024-03-22 | End: 2024-03-22 | Stop reason: HOSPADM

## 2024-03-21 RX ORDER — CIPROFLOXACIN 500 MG/1
500 TABLET ORAL EVERY 24 HOURS
Status: DISCONTINUED | OUTPATIENT
Start: 2024-03-21 | End: 2024-03-22 | Stop reason: HOSPADM

## 2024-03-21 RX ORDER — MONTELUKAST SODIUM 10 MG/1
10 TABLET ORAL DAILY
Status: DISCONTINUED | OUTPATIENT
Start: 2024-03-22 | End: 2024-03-22 | Stop reason: HOSPADM

## 2024-03-21 RX ORDER — PANTOPRAZOLE SODIUM 40 MG/1
40 TABLET, DELAYED RELEASE ORAL DAILY
Status: DISCONTINUED | OUTPATIENT
Start: 2024-03-22 | End: 2024-03-22 | Stop reason: HOSPADM

## 2024-03-21 RX ORDER — SODIUM CHLORIDE 0.9 % (FLUSH) 0.9 %
10 SYRINGE (ML) INJECTION EVERY 12 HOURS PRN
Status: DISCONTINUED | OUTPATIENT
Start: 2024-03-21 | End: 2024-03-22 | Stop reason: HOSPADM

## 2024-03-21 RX ORDER — ASPIRIN 81 MG/1
81 TABLET ORAL DAILY
Status: DISCONTINUED | OUTPATIENT
Start: 2024-03-22 | End: 2024-03-22 | Stop reason: HOSPADM

## 2024-03-21 RX ORDER — IBUPROFEN 200 MG
24 TABLET ORAL
Status: DISCONTINUED | OUTPATIENT
Start: 2024-03-21 | End: 2024-03-22 | Stop reason: HOSPADM

## 2024-03-21 RX ADMIN — BUSPIRONE HYDROCHLORIDE 5 MG: 5 TABLET ORAL at 09:03

## 2024-03-21 RX ADMIN — ASPIRIN 325 MG ORAL TABLET 325 MG: 325 PILL ORAL at 09:03

## 2024-03-21 RX ADMIN — ONDANSETRON 4 MG: 2 INJECTION INTRAMUSCULAR; INTRAVENOUS at 04:03

## 2024-03-21 RX ADMIN — CIPROFLOXACIN 500 MG: 500 TABLET, FILM COATED ORAL at 09:03

## 2024-03-21 RX ADMIN — ONDANSETRON 4 MG: 2 INJECTION INTRAMUSCULAR; INTRAVENOUS at 01:03

## 2024-03-21 RX ADMIN — SODIUM CHLORIDE 500 ML: 9 INJECTION, SOLUTION INTRAVENOUS at 01:03

## 2024-03-21 RX ADMIN — IOHEXOL 75 ML: 350 INJECTION, SOLUTION INTRAVENOUS at 03:03

## 2024-03-21 RX ADMIN — ATORVASTATIN CALCIUM 40 MG: 40 TABLET, FILM COATED ORAL at 09:03

## 2024-03-21 RX ADMIN — CLORAZEPATE DIPOTASSIUM 3.75 MG: 3.75 TABLET ORAL at 09:03

## 2024-03-21 RX ADMIN — CLORAZEPATE DIPOTASSIUM 7.5 MG: 3.75 TABLET ORAL at 09:03

## 2024-03-21 RX ADMIN — ACETAMINOPHEN 1000 MG: 500 TABLET ORAL at 02:03

## 2024-03-21 NOTE — ED TRIAGE NOTES
Pt present to the er with complaints of dizziness, weakness and a headache. Pt states that every time she stands for too long to clean her house for example she gets light headed and dizzy and she has to lie down. Pt states that this has been happening more and more often over time. Pt has a hx of chf and a fib. Denies any blood thinners or chest pain. Reports that this dizziness comes with pain in right shoulder as well. No edema noted in the legs. Lungs clear. Radial and pedal pulses present. Pt skin appears pale and clear. Awake alert and orientated x4. Able to answer questions and follows commands.

## 2024-03-21 NOTE — ED NOTES
Ct reported when they bought pt back from ct scan that her iv infiltrated. Iv removed by ct tech heat bag applied. Arm evaluated on pillow Pt able to wiggle finger. Radial pulse even and 2+. Provider notified.

## 2024-03-21 NOTE — ED PROVIDER NOTES
"Encounter Date: 3/21/2024       History     Chief Complaint   Patient presents with    Chest Pain     Mid sternal CP with SOB, x 1 week intermittently, + weakness reported  and right shoulder pain      78 yr old female presents to the ER with reports of chest pain, sob, nausea, and abd pain. PT states he has also been having headache, shoulder pain and occasional cough.Reports some dysuria. Denies fever, rash or neck stiffness. Pt states symptoms have been occurring over the past week. Denies hematemesis, melena or hematochezia. Pt reports "I just feel so weak and have no appetite". Pmh of anxiety, copd, ckd, depression, high cholesterol, htn, oral cancer, sore throat, vitamin d deficiency.     The history is provided by the patient and a relative. No  was used.     Review of patient's allergies indicates:   Allergen Reactions    Pcn [penicillins]     Sulfa (sulfonamide antibiotics)      Past Medical History:   Diagnosis Date    Anxiety     CKD (chronic kidney disease) stage 3, GFR 30-59 ml/min     COPD (chronic obstructive pulmonary disease)     Depression     Encounter for blood transfusion     Fibromyalgia     Hematuria     High cholesterol     Hypertension     Oral cancer     Proteinuria     Sore throat 7/3/2019    Urinary tract infection     Vitamin D deficiency      Past Surgical History:   Procedure Laterality Date    ABDOMINAL SURGERY      [place]    HYSTERECTOMY      INSERTION OF IMPLANTABLE LOOP RECORDER N/A 10/15/2021    Procedure: Insertion, Implantable Loop Recorder;  Surgeon: Cristhian Alvares MD;  Location: [facility] CATH LAB/EP;  Service: Cardiology;  Laterality: N/A;    IRRIGATION AND DEBRIDEMENT OF LOWER EXTREMITY Right 11/9/2022    Procedure: IRRIGATION AND DEBRIDEMENT, LOWER EXTREMITY, SECOND TOE;  Surgeon: Alfa Quintero DPM;  Location: [facility] OR;  Service: Podiatry;  Laterality: Right;  micro sagittal saw, vancomycin powder    OOPHORECTOMY      only one removed    Stomach Ulcer " Surgery       Family History   Adopted: Yes   Problem Relation Age of Onset    Heart disease Mother     Pacemaker/defibrilator Brother     Heart disease Brother     Lung cancer Brother     Heart disease Maternal Aunt      Social History     Tobacco Use    Smoking status: Former     Current packs/day: 0.00     Average packs/day: 1.5 packs/day for 45.0 years (67.5 ttl pk-yrs)     Types: Cigarettes     Start date: 1950     Quit date: 1995     Years since quittin.6    Smokeless tobacco: Never    Tobacco comments:     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   Substance Use Topics    Alcohol use: No    Drug use: Never     Review of Systems   Constitutional:  Positive for fatigue.   HENT:  Positive for sore throat.    Respiratory:  Positive for shortness of breath.    Cardiovascular:  Positive for chest pain.   Gastrointestinal:  Positive for abdominal pain and nausea.   Neurological:  Positive for weakness.   All other systems reviewed and are negative.      Physical Exam     Initial Vitals [24 1154]   BP Pulse Resp Temp SpO2   (!) 195/95 103 (!) 22 98.2 °F (36.8 °C) 100 %      MAP       --         Physical Exam    Constitutional: She appears well-developed and well-nourished. She appears ill.   HENT:   Head: Normocephalic.   Right Ear: Hearing and tympanic membrane normal.   Left Ear: Hearing and tympanic membrane normal.   Nose: Nose normal.   Mouth/Throat: Oropharynx is clear and moist.   Eyes: Lids are normal. Pupils are equal, round, and reactive to light.   Neck:   Normal range of motion.  Cardiovascular:  Normal rate.           Pulmonary/Chest: Breath sounds normal. No respiratory distress. She has no wheezes. She has no rhonchi.   Abdominal: Abdomen is soft. There is abdominal tenderness in the right upper quadrant and periumbilical area. There is no rebound.   Musculoskeletal:         General: Normal range of motion.      Cervical back: Normal range of motion. No rigidity.      Neurological: She is alert and oriented to person, place, and time.   Skin: Skin is warm and dry. No rash noted. There is pallor.   Psychiatric: She has a normal mood and affect. Her behavior is normal. Judgment and thought content normal.         ED Course   Procedures  Labs Reviewed   CBC W/ AUTO DIFFERENTIAL - Abnormal; Notable for the following components:       Result Value    MCV 77 (*)     MCH 23.6 (*)     MCHC 30.9 (*)     RDW 17.4 (*)     All other components within normal limits   COMPREHENSIVE METABOLIC PANEL - Abnormal; Notable for the following components:    CO2 20 (*)     eGFR 39 (*)     All other components within normal limits   B-TYPE NATRIURETIC PEPTIDE - Abnormal; Notable for the following components:     (*)     All other components within normal limits   URINALYSIS, REFLEX TO URINE CULTURE - Abnormal; Notable for the following components:    Nitrite, UA Positive (*)     Leukocytes, UA 1+ (*)     All other components within normal limits    Narrative:     Specimen Source->Urine   URINALYSIS MICROSCOPIC - Abnormal; Notable for the following components:    WBC, UA 16 (*)     All other components within normal limits    Narrative:     Specimen Source->Urine   INFLUENZA A & B BY MOLECULAR   CULTURE, URINE   TROPONIN I   SARS-COV-2 RNA AMPLIFICATION, QUAL   TROPONIN I        ECG Results              EKG 12-lead (In process)        Collection Time Result Time QRS Duration OHS QTC Calculation    03/21/24 11:58:26 03/21/24 13:00:15 86 452                     In process by Interface, Lab In [facility] (03/21/24 13:00:23)                   Narrative:    Test Reason : R07.9,    Vent. Rate : 097 BPM     Atrial Rate : 097 BPM     P-R Int : 144 ms          QRS Dur : 086 ms      QT Int : 356 ms       P-R-T Axes : -22 -06 131 degrees     QTc Int : 452 ms    Normal sinus rhythm  LVH with repolarization abnormality  Anterior infarct ,age undetermined  Abnormal ECG  When compared with ECG of 03-NOV-2023  12:30,  Anterior infarct is now Present  T wave inversion more evident in Lateral leads    Referred By: [email or web address]   SELF           Confirmed By:                                   Imaging Results              CT Abdomen Pelvis With IV Contrast NO Oral Contrast (Final result)  Result time 03/21/24 16:06:25      Final result by Kaye Michel MD (03/21/24 16:06:25)                   Impression:      No evidence of small-bowel obstruction.    Prominent intrahepatic and extrahepatic biliary ducts without definite obstructive process seen, the pancreatic duct is also slightly enlarged.  It is unchanged from the prior study of 11/25/2021 exam.  To correlate with liver enzymes, an MRCP could be done if clinically warranted.    Several hypoattenuating lesions of the left kidney, too small to characterize favored to represent cysts, not significantly changed from the prior study.    Cholecystectomy.    Hysterectomy.      Electronically signed by: Kaye Michel MD  Date:    03/21/2024  Time:    16:06               Narrative:    EXAMINATION:  CT ABDOMEN PELVIS WITH IV CONTRAST    CLINICAL HISTORY:  Abdominal pain, acute, nonlocalized;Right upper abd pain, vomiting, Hx of SBO;    TECHNIQUE:  Low dose axial images, sagittal and coronal reformations were obtained from the lung bases to the pubic symphysis following the IV administration of 75 mL of Omnipaque 350 .  Oral contrast was not given. Axial and coronal images reformatted.    COMPARISON:  11/25/2021 CT abdomen and pelvis with contrast    FINDINGS:  Minimal subpleural lines, unchanged from the prior study suggestive of minimal fibrosis.  No acute focal area of airspace consolidation, no pleural effusion.  No pericardial effusion.    There is mild prominence of the intrahepatic and extrahepatic biliary ducts, unchanged from the prior study, no obstructive process seen, this can be seen after cholecystectomy.  The common bile duct measures 12 mm.  The  gallbladder is removed.    Several surgical clips at the gastroesophageal junction.  The stomach is nondistended.    The pancreatic duct is prominent measuring 5 mm, unchanged from the prior study, no pancreatic tissue atrophy, no pancreatic mass seen.  No peripancreatic inflammatory change.    The spleen and bilateral adrenal glands appear normal.    The right kidney enhance normally, no hydronephrosis or definite stone identified.  The left kidney enhance normally there are few small hypoattenuating lesions, cannot be definitely characterized due to their small size, they are similar to the prior exam and are favored to represent cysts.  The bilateral ureters cannot be followed in their entirety within the pelvis due to paucity of fat, no definite obstructive process seen.  The bladder appears normal, no stone is seen within.  The uterus is removed.  The ovaries are not definitely identified.    Mild stool retention, no inflammatory changes of the bowel seen, no bowel dilation.  The appendix is not identified.  The small bowel is not dilated.  No inflammatory changes of the mesentery.  No free air, no free fluid.    The abdominal aorta tapers normally, there is moderate atherosclerotic plaque.  No para-aortic lymphadenopathy.    The abdominal wall is intact, the inguinal regions appear normal.    The osseous structures demonstrate no osseous lesions.  Grade 1 near 2 anterolisthesis of L4 relative to L5.                                       X-Ray Chest 1 View (Final result)  Result time 03/21/24 13:26:57      Final result by Cliff Lira DO (03/21/24 13:26:57)                   Impression:      Please see above      Electronically signed by: Cliff Lira DO  Date:    03/21/2024  Time:    13:26               Narrative:    EXAMINATION:  XR CHEST 1 VIEW    CLINICAL HISTORY:  Chest pain, unspecified    TECHNIQUE:  Single frontal view of the chest was performed.    COMPARISON:  03/29/2022    FINDINGS:  Slight  nonspecific elevation left lung base.  Continued coarse interstitial opacities throughout the lungs which are nonspecific and may represent fibrosis and scarring.  There is no new lung opacity.  No large lung consolidation.  No large pleural effusion or pneumothorax.  Continued atherosclerotic aorta.  Implanted presume cardiac device overlies the left thorax.  Continued surgical clips in the epigastric region.  Further evaluation as warranted clinically.                                       Medications   ondansetron injection 4 mg (4 mg Intravenous Given 3/21/24 1350)   sodium chloride 0.9% bolus 500 mL 500 mL (0 mLs Intravenous Stopped 3/21/24 1654)   acetaminophen tablet 1,000 mg (1,000 mg Oral Given 3/21/24 1456)   iohexoL (OMNIPAQUE 350) injection 75 mL (75 mLs Intravenous Given 3/21/24 1527)   ondansetron injection 4 mg (4 mg Intravenous Given 3/21/24 1606)     Medical Decision Making  Amount and/or Complexity of Data Reviewed  Labs: ordered. Decision-making details documented in ED Course.  Radiology: ordered.    Risk  OTC drugs.  Prescription drug management.      Additional MDM:   Heart Score:    History:          Moderately suspicious.  ECG:             Normal  Age:               >65 years  Risk factors: >= 3 risk factors or history of atherosclerotic disease  Troponin:       Less than or equal to normal limit  Heart Score = 5                ED Course as of 03/21/24 1659   Thu Mar 21, 2024   1339 Brain natriuretic peptide(!) [DT]   1339 Troponin I [DT]   1340 Comprehensive metabolic panel(!) [DT]   1340 CBC auto differential(!) [DT]   1340 FINDINGS:  Slight nonspecific elevation left lung base.  Continued coarse interstitial opacities throughout the lungs which are nonspecific and may represent fibrosis and scarring.  There is no new lung opacity.  No large lung consolidation.  No large pleural effusion or pneumothorax.  Continued atherosclerotic aorta.  Implanted presume cardiac device overlies the left  thorax.  Continued surgical clips in the epigastric region.  Further evaluation as warranted clinically.     Impression:     Please see above   [DT]   1444 Pt now requesting meds for headache. Tylenol ordered [DT]   1523 Influenza A & B by Molecular [DT]   1524 COVID-19 Rapid Screening [DT]   1524 Pt states her condition is improving after the Zofran was given. CT pending.  [DT]   1600 NSR with LVH rate of 97, no stemi noted.   No ectopy noted. Signed per Dr Solano.  [DT]   1602 Troponin I [DT]   1616 Impression:     No evidence of small-bowel obstruction.     Prominent intrahepatic and extrahepatic biliary ducts without definite obstructive process seen, the pancreatic duct is also slightly enlarged.  It is unchanged from the prior study of 11/25/2021 exam.  To correlate with liver enzymes, an MRCP could be done if clinically warranted.     Several hypoattenuating lesions of the left kidney, too small to characterize favored to represent cysts, not significantly changed from the prior study.     Cholecystectomy.     Hysterectomy.      [DT]   1616 Urine culture pending. Pt is + for UTI at this time [DT]   1631 Case reviewed with Dr Solano. Pt has heart score of 5. Due to this risk in addition to complaints, will plan for admission.  [DT]   1634 Pt was assisted to the restroom however states she is having an increase in weakness and required assistance.  [DT]   1636 [facility] paged for admission due to pts condition-weakness, chest pain, nausea, vomiting, UTI [DT]   1648 Spoke with [facility] for admission. States they will look over chart before wanting me to add Antibiotics for UTI.  [DT]      ED Course User Index  [DT] Kendy Bowman, NP                           Clinical Impression:  Final diagnoses:  [R07.9] Chest pain  [R53.1] Weakness (Primary)  [R11.2] Nausea and vomiting, unspecified vomiting type  [N30.00] Acute cystitis without hematuria          ED Disposition Condition    Observation Stable                 Kendy Bowman, NP  03/21/24 1647       Kendy Bowman, TANO  03/21/24 3288

## 2024-03-21 NOTE — ED NOTES
Patient returned from bathroom, in bed, side rails up x 2. Call light within reach. Family at bedside. Warm blankets given to patient. Kleenex box given to patient. Awake, alert, oriented at baseline. Generalized weakness. Able to transfer self with minimal assistance from wheelchair to bed. Patient complaints of 8/10 HA without vision changes. NP notified. Orders received.

## 2024-03-21 NOTE — FIRST PROVIDER EVALUATION
Emergency Department TeleTriage Encounter Note      CHIEF COMPLAINT    Chief Complaint   Patient presents with    Chest Pain     Mid sternal CP with SOB, x 1 week intermittently, + weakness reported  and right shoulder pain        VITAL SIGNS   Initial Vitals [03/21/24 1154]   BP Pulse Resp Temp SpO2   (!) 195/95 103 (!) 22 98.2 °F (36.8 °C) 100 %      MAP       --            ALLERGIES    Review of patient's allergies indicates:   Allergen Reactions    Pcn [penicillins]     Sulfa (sulfonamide antibiotics)        PROVIDER TRIAGE NOTE  Patient presents with complaint of chest pain and shortness of breath for week.  Denies lower extremity swelling.      Phy:   Constitutional: well nourished, well developed, appearing stated age, NAD        Initial orders will be placed and care will be transferred to an alternate provider when patient is roomed for a full evaluation. Any additional orders and the final disposition will be determined by that provider.        ORDERS  Labs Reviewed   CBC W/ AUTO DIFFERENTIAL   COMPREHENSIVE METABOLIC PANEL   B-TYPE NATRIURETIC PEPTIDE   TROPONIN I       ED Orders (720h ago, onward)      Start Ordered     Status Ordering Provider    03/21/24 1201 03/21/24 1200  CBC auto differential  STAT         Collected - by HOOD PRO on 3/21/2024 at 12:15 PM SEN FINK    03/21/24 1201 03/21/24 1200  Comprehensive metabolic panel  STAT         Collected - by HOOD PRO on 3/21/2024 at 12:15 PM SEN FINK    03/21/24 1201 03/21/24 1200  Brain natriuretic peptide  STAT         Collected - by HOOD PRO on 3/21/2024 at 12:15 PM SEN FINK    03/21/24 1201 03/21/24 1200  Insert Saline lock IV  Once         Acknowledged SNE FINK    03/21/24 1201 03/21/24 1200  EKG 12-lead  Once         Acknowledged SEN FINK    03/21/24 1201 03/21/24 1200  Troponin I  STAT         Collected - by HOOD PRO on 3/21/2024 at 12:15 PM SEN FINK     03/21/24 1201 03/21/24 1200  X-Ray Chest 1 View  1 time imaging         Acknowledged SEN FINK              Virtual Visit Note: The provider triage portion of this emergency department evaluation and documentation was performed via [employer], a HIPAA-compliant telemedicine application, in concert with a tele-presenter in the room. A face to face patient evaluation with one of my colleagues will occur once the patient is placed in an emergency department room.      DISCLAIMER: This note was prepared with [employer] voice recognition transcription software. Garbled syntax, mangled pronouns, and other bizarre constructions may be attributed to that software system.

## 2024-03-22 VITALS
HEART RATE: 79 BPM | HEIGHT: 63 IN | SYSTOLIC BLOOD PRESSURE: 139 MMHG | TEMPERATURE: 98 F | DIASTOLIC BLOOD PRESSURE: 74 MMHG | WEIGHT: 112.88 LBS | BODY MASS INDEX: 20 KG/M2 | RESPIRATION RATE: 18 BRPM | OXYGEN SATURATION: 96 %

## 2024-03-22 LAB
ALBUMIN SERPL BCP-MCNC: 3.2 G/DL (ref 3.5–5.2)
ALP SERPL-CCNC: 92 U/L (ref 55–135)
ALT SERPL W/O P-5'-P-CCNC: 15 U/L (ref 10–44)
ANION GAP SERPL CALC-SCNC: 10 MMOL/L (ref 8–16)
ASCENDING AORTA: 3.1 CM
AST SERPL-CCNC: 24 U/L (ref 10–40)
AV INDEX (PROSTH): 0.91
AV MEAN GRADIENT: 6 MMHG
AV PEAK GRADIENT: 12 MMHG
AV VALVE AREA BY VELOCITY RATIO: 2.86 CM²
AV VALVE AREA: 2.84 CM²
AV VELOCITY RATIO: 0.92
BASOPHILS # BLD AUTO: 0.1 K/UL (ref 0–0.2)
BASOPHILS NFR BLD: 1.7 % (ref 0–1.9)
BILIRUB SERPL-MCNC: 0.2 MG/DL (ref 0.1–1)
BSA FOR ECHO PROCEDURE: 1.51 M2
BUN SERPL-MCNC: 17 MG/DL (ref 8–23)
CALCIUM SERPL-MCNC: 9.7 MG/DL (ref 8.7–10.5)
CHLORIDE SERPL-SCNC: 108 MMOL/L (ref 95–110)
CHOLEST SERPL-MCNC: 141 MG/DL (ref 120–199)
CHOLEST/HDLC SERPL: 3.1 {RATIO} (ref 2–5)
CO2 SERPL-SCNC: 21 MMOL/L (ref 23–29)
CREAT SERPL-MCNC: 1.2 MG/DL (ref 0.5–1.4)
CV ECHO LV RWT: 0.73 CM
DIFFERENTIAL METHOD BLD: ABNORMAL
DOP CALC AO PEAK VEL: 1.74 M/S
DOP CALC AO VTI: 35.4 CM
DOP CALC LVOT AREA: 3.1 CM2
DOP CALC LVOT DIAMETER: 1.99 CM
DOP CALC LVOT PEAK VEL: 1.6 M/S
DOP CALC LVOT STROKE VOLUME: 100.41 CM3
DOP CALCLVOT PEAK VEL VTI: 32.3 CM
E WAVE DECELERATION TIME: 323.74 MSEC
E/A RATIO: 0.61
E/E' RATIO: 9.14 M/S
ECHO LV POSTERIOR WALL: 1.19 CM (ref 0.6–1.1)
EOSINOPHIL # BLD AUTO: 0.6 K/UL (ref 0–0.5)
EOSINOPHIL NFR BLD: 10.6 % (ref 0–8)
ERYTHROCYTE [DISTWIDTH] IN BLOOD BY AUTOMATED COUNT: 17.3 % (ref 11.5–14.5)
ERYTHROCYTE [SEDIMENTATION RATE] IN BLOOD BY PHOTOMETRIC METHOD: 43 MM/HR (ref 0–36)
EST. GFR  (NO RACE VARIABLE): 46 ML/MIN/1.73 M^2
ESTIMATED AVG GLUCOSE: 114 MG/DL (ref 68–131)
FRACTIONAL SHORTENING: 28 % (ref 28–44)
GLUCOSE SERPL-MCNC: 85 MG/DL (ref 70–110)
HBA1C MFR BLD: 5.6 % (ref 4–5.6)
HCT VFR BLD AUTO: 35.9 % (ref 37–48.5)
HDLC SERPL-MCNC: 46 MG/DL (ref 40–75)
HDLC SERPL: 32.6 % (ref 20–50)
HGB BLD-MCNC: 11.1 G/DL (ref 12–16)
IMM GRANULOCYTES # BLD AUTO: 0.01 K/UL (ref 0–0.04)
IMM GRANULOCYTES NFR BLD AUTO: 0.2 % (ref 0–0.5)
INTERVENTRICULAR SEPTUM: 0.81 CM (ref 0.6–1.1)
IVC DIAMETER: 0.64 CM
LA MAJOR: 4.9 CM
LA MINOR: 5.08 CM
LA WIDTH: 3.2 CM
LDLC SERPL CALC-MCNC: 82 MG/DL (ref 63–159)
LEFT ATRIUM SIZE: 2.98 CM
LEFT ATRIUM VOLUME INDEX MOD: 25.8 ML/M2
LEFT ATRIUM VOLUME INDEX: 26.6 ML/M2
LEFT ATRIUM VOLUME MOD: 39.14 CM3
LEFT ATRIUM VOLUME: 40.43 CM3
LEFT INTERNAL DIMENSION IN SYSTOLE: 2.35 CM (ref 2.1–4)
LEFT VENTRICLE DIASTOLIC VOLUME INDEX: 28.05 ML/M2
LEFT VENTRICLE DIASTOLIC VOLUME: 42.63 ML
LEFT VENTRICLE MASS INDEX: 61 G/M2
LEFT VENTRICLE SYSTOLIC VOLUME INDEX: 12.6 ML/M2
LEFT VENTRICLE SYSTOLIC VOLUME: 19.11 ML
LEFT VENTRICULAR INTERNAL DIMENSION IN DIASTOLE: 3.25 CM (ref 3.5–6)
LEFT VENTRICULAR MASS: 92.43 G
LV LATERAL E/E' RATIO: 8 M/S
LV SEPTAL E/E' RATIO: 10.67 M/S
LVOT MG: 6.63 MMHG
LVOT MV: 1.24 CM/S
LYMPHOCYTES # BLD AUTO: 1.5 K/UL (ref 1–4.8)
LYMPHOCYTES NFR BLD: 26.2 % (ref 18–48)
MAGNESIUM SERPL-MCNC: 1.9 MG/DL (ref 1.6–2.6)
MCH RBC QN AUTO: 23.8 PG (ref 27–31)
MCHC RBC AUTO-ENTMCNC: 30.9 G/DL (ref 32–36)
MCV RBC AUTO: 77 FL (ref 82–98)
MONOCYTES # BLD AUTO: 1 K/UL (ref 0.3–1)
MONOCYTES NFR BLD: 16.5 % (ref 4–15)
MV PEAK A VEL: 1.05 M/S
MV PEAK E VEL: 0.64 M/S
NEUTROPHILS # BLD AUTO: 2.6 K/UL (ref 1.8–7.7)
NEUTROPHILS NFR BLD: 44.8 % (ref 38–73)
NONHDLC SERPL-MCNC: 95 MG/DL
NRBC BLD-RTO: 0 /100 WBC
OHS QRS DURATION: 88 MS
OHS QRS DURATION: 90 MS
OHS QTC CALCULATION: 427 MS
OHS QTC CALCULATION: 434 MS
PHOSPHATE SERPL-MCNC: 3.2 MG/DL (ref 2.7–4.5)
PLATELET # BLD AUTO: 240 K/UL (ref 150–450)
PMV BLD AUTO: 10.1 FL (ref 9.2–12.9)
POTASSIUM SERPL-SCNC: 4.2 MMOL/L (ref 3.5–5.1)
PROT SERPL-MCNC: 6.9 G/DL (ref 6–8.4)
PULM VEIN S/D RATIO: 0.81
PV MV: 0.66 M/S
PV PEAK D VEL: 0.32 M/S
PV PEAK GRADIENT: 3 MMHG
PV PEAK S VEL: 0.26 M/S
PV PEAK VELOCITY: 0.82 M/S
RA PRESSURE ESTIMATED: 3 MMHG
RBC # BLD AUTO: 4.66 M/UL (ref 4–5.4)
RV TISSUE DOPPLER FREE WALL SYSTOLIC VELOCITY 1 (APICAL 4 CHAMBER VIEW): 13.35 CM/S
SINUS: 2.94 CM
SODIUM SERPL-SCNC: 139 MMOL/L (ref 136–145)
STJ: 2.21 CM
TDI LATERAL: 0.08 M/S
TDI SEPTAL: 0.06 M/S
TDI: 0.07 M/S
TRICUSPID ANNULAR PLANE SYSTOLIC EXCURSION: 2.42 CM
TRIGL SERPL-MCNC: 65 MG/DL (ref 30–150)
TROPONIN I SERPL DL<=0.01 NG/ML-MCNC: 0.01 NG/ML (ref 0–0.03)
TROPONIN I SERPL DL<=0.01 NG/ML-MCNC: <0.006 NG/ML (ref 0–0.03)
WBC # BLD AUTO: 5.76 K/UL (ref 3.9–12.7)
Z-SCORE OF LEFT VENTRICULAR DIMENSION IN END DIASTOLE: -3.15
Z-SCORE OF LEFT VENTRICULAR DIMENSION IN END SYSTOLE: -1.28

## 2024-03-22 PROCEDURE — 27000221 HC OXYGEN, UP TO 24 HOURS

## 2024-03-22 PROCEDURE — 80053 COMPREHEN METABOLIC PANEL: CPT | Performed by: STUDENT IN AN ORGANIZED HEALTH CARE EDUCATION/TRAINING PROGRAM

## 2024-03-22 PROCEDURE — 85652 RBC SED RATE AUTOMATED: CPT | Performed by: STUDENT IN AN ORGANIZED HEALTH CARE EDUCATION/TRAINING PROGRAM

## 2024-03-22 PROCEDURE — 85025 COMPLETE CBC W/AUTO DIFF WBC: CPT | Performed by: STUDENT IN AN ORGANIZED HEALTH CARE EDUCATION/TRAINING PROGRAM

## 2024-03-22 PROCEDURE — 83735 ASSAY OF MAGNESIUM: CPT | Performed by: STUDENT IN AN ORGANIZED HEALTH CARE EDUCATION/TRAINING PROGRAM

## 2024-03-22 PROCEDURE — 97116 GAIT TRAINING THERAPY: CPT

## 2024-03-22 PROCEDURE — G0378 HOSPITAL OBSERVATION PER HR: HCPCS

## 2024-03-22 PROCEDURE — 97535 SELF CARE MNGMENT TRAINING: CPT

## 2024-03-22 PROCEDURE — 36415 COLL VENOUS BLD VENIPUNCTURE: CPT | Performed by: STUDENT IN AN ORGANIZED HEALTH CARE EDUCATION/TRAINING PROGRAM

## 2024-03-22 PROCEDURE — 25000003 PHARM REV CODE 250: Performed by: STUDENT IN AN ORGANIZED HEALTH CARE EDUCATION/TRAINING PROGRAM

## 2024-03-22 PROCEDURE — 25500020 PHARM REV CODE 255: Performed by: INTERNAL MEDICINE

## 2024-03-22 PROCEDURE — 84484 ASSAY OF TROPONIN QUANT: CPT | Performed by: STUDENT IN AN ORGANIZED HEALTH CARE EDUCATION/TRAINING PROGRAM

## 2024-03-22 PROCEDURE — 80061 LIPID PANEL: CPT | Performed by: STUDENT IN AN ORGANIZED HEALTH CARE EDUCATION/TRAINING PROGRAM

## 2024-03-22 PROCEDURE — 97161 PT EVAL LOW COMPLEX 20 MIN: CPT

## 2024-03-22 PROCEDURE — 25500020 PHARM REV CODE 255: Performed by: STUDENT IN AN ORGANIZED HEALTH CARE EDUCATION/TRAINING PROGRAM

## 2024-03-22 PROCEDURE — 84100 ASSAY OF PHOSPHORUS: CPT | Performed by: STUDENT IN AN ORGANIZED HEALTH CARE EDUCATION/TRAINING PROGRAM

## 2024-03-22 PROCEDURE — 25000003 PHARM REV CODE 250

## 2024-03-22 PROCEDURE — 96372 THER/PROPH/DIAG INJ SC/IM: CPT | Mod: 59 | Performed by: INTERNAL MEDICINE

## 2024-03-22 PROCEDURE — 97165 OT EVAL LOW COMPLEX 30 MIN: CPT

## 2024-03-22 PROCEDURE — 63600175 PHARM REV CODE 636 W HCPCS: Performed by: INTERNAL MEDICINE

## 2024-03-22 PROCEDURE — 93010 ELECTROCARDIOGRAM REPORT: CPT | Mod: ,,, | Performed by: INTERNAL MEDICINE

## 2024-03-22 PROCEDURE — 83036 HEMOGLOBIN GLYCOSYLATED A1C: CPT | Performed by: STUDENT IN AN ORGANIZED HEALTH CARE EDUCATION/TRAINING PROGRAM

## 2024-03-22 PROCEDURE — 84484 ASSAY OF TROPONIN QUANT: CPT | Mod: 91 | Performed by: STUDENT IN AN ORGANIZED HEALTH CARE EDUCATION/TRAINING PROGRAM

## 2024-03-22 PROCEDURE — 93005 ELECTROCARDIOGRAM TRACING: CPT

## 2024-03-22 RX ORDER — CIPROFLOXACIN 500 MG/1
500 TABLET ORAL EVERY 12 HOURS
Qty: 5 TABLET | Refills: 0 | Status: SHIPPED | OUTPATIENT
Start: 2024-03-22 | End: 2024-03-25

## 2024-03-22 RX ORDER — LANOLIN ALCOHOL/MO/W.PET/CERES
1 CREAM (GRAM) TOPICAL DAILY
Status: DISCONTINUED | OUTPATIENT
Start: 2024-03-22 | End: 2024-03-22 | Stop reason: HOSPADM

## 2024-03-22 RX ORDER — FERROUS SULFATE 324(65)MG
325 TABLET, DELAYED RELEASE (ENTERIC COATED) ORAL EVERY OTHER DAY
Qty: 15 TABLET | Refills: 2 | Status: SHIPPED | OUTPATIENT
Start: 2024-03-22 | End: 2024-06-20

## 2024-03-22 RX ORDER — TRAMADOL HYDROCHLORIDE AND ACETAMINOPHEN 37.5; 325 MG/1; MG/1
1 TABLET, FILM COATED ORAL EVERY 8 HOURS PRN
Status: DISCONTINUED | OUTPATIENT
Start: 2024-03-22 | End: 2024-03-22 | Stop reason: HOSPADM

## 2024-03-22 RX ADMIN — IOHEXOL 100 ML: 350 INJECTION, SOLUTION INTRAVENOUS at 04:03

## 2024-03-22 RX ADMIN — ASPIRIN 81 MG: 81 TABLET, COATED ORAL at 09:03

## 2024-03-22 RX ADMIN — BUSPIRONE HYDROCHLORIDE 5 MG: 5 TABLET ORAL at 09:03

## 2024-03-22 RX ADMIN — MONTELUKAST 10 MG: 10 TABLET, FILM COATED ORAL at 09:03

## 2024-03-22 RX ADMIN — PANTOPRAZOLE SODIUM 40 MG: 40 TABLET, DELAYED RELEASE ORAL at 09:03

## 2024-03-22 RX ADMIN — TRAMADOL HYDROCHLORIDE AND ACETAMINOPHEN 1 TABLET: 37.5; 325 TABLET ORAL at 02:03

## 2024-03-22 RX ADMIN — ENOXAPARIN SODIUM 30 MG: 30 INJECTION SUBCUTANEOUS at 09:03

## 2024-03-22 RX ADMIN — FERROUS SULFATE TAB 325 MG (65 MG ELEMENTAL FE) 1 EACH: 325 (65 FE) TAB at 09:03

## 2024-03-22 RX ADMIN — HUMAN ALBUMIN MICROSPHERES AND PERFLUTREN 0.11 MG: 10; .22 INJECTION, SOLUTION INTRAVENOUS at 10:03

## 2024-03-22 RX ADMIN — CLORAZEPATE DIPOTASSIUM 3.75 MG: 3.75 TABLET ORAL at 08:03

## 2024-03-22 NOTE — NURSING
"Dr Garcia on unit floor, informed of 1918 troponin result of 0.038. no orders given. Stated "ok."  "

## 2024-03-22 NOTE — PLAN OF CARE
VN note: VN completed AVS and attachments and notified bedside nurseBaylee. Will cont to be available and intervene prn.

## 2024-03-22 NOTE — NURSING
Pt and daughter at BS, informed of NPO status after MN, voiced complete understanding,pt stated she will comply.

## 2024-03-22 NOTE — PT/OT/SLP PROGRESS
Physical Therapy      Patient Name:  Autumn Harris   MRN:  [id number]    Patient not seen today secondary to Off the floor for procedure/surgery (Cardio testing). Will follow-up as able.  3/22/2024

## 2024-03-22 NOTE — PLAN OF CARE
"SW spoke with pt about discharge orders and MD's Home health orders. Pt is not interested in "people coming to her house." She states that she "sleeps late in the day and doesn't need all that coming in and messing with her schedule."    Notified VN and medical team of all. Pt's sister to transport home. She was calling her after we spoke. PCP requested, pending call back Monday.       03/22/24 0112   Post-Acute Status   Post-Acute Authorization Home Health   Home Health Status Patient declined/refused   Discharge Delays None known at this time   Discharge Plan   Discharge Plan A Home with family;Home   Discharge Plan B Home         "

## 2024-03-22 NOTE — PLAN OF CARE
Recommendations  Recommendation:   1. Change diet to cardiac   2. Add Boost plus BID   3. Monitor weight and labs    Goals: Pt will consume 50-75% of meals by RD follow up    Nutrition Goal Status: new  Communication of RD Recs:  (POC)

## 2024-03-22 NOTE — PT/OT/SLP EVAL
Physical Therapy Evaluation    Patient Name:  Autumn Harris   MRN:  [id number]    Recommendations:     Discharge Recommendations: Low Intensity Therapy   Discharge Equipment Recommendations: none (pt has access to std cane, quad cane, rollator, RW, BSC, shower chair)   Barriers to discharge: None    Assessment:     Autumn Harris is a 78 y.o. female admitted with a medical diagnosis of Weakness.  She presents with the following impairments/functional limitations: weakness, impaired endurance, impaired self care skills, impaired functional mobility, gait instability, impaired balance, decreased coordination, decreased safety awareness, pain, impaired cardiopulmonary response to activity Overlap co-tx with OT in anticipation of decreased tolerance to activity; will recommend HH PT/OT to maximize functional independence; pt has all needed equipment and pt's sister will be available for any assistance needed; at this time, pt is supervision with bed mobility, SBA with OOB activity; recommend use of RW at home 2/2 pt's fatigue, decreased balance; instructed in pacing and pursed lip breathing for SOB; pt able to amb~40ft using RW with SBA; O2 sats 95% on RA at rest; 92-93% with amb on RA; 93-94% post amb on RA; will cont with POC. .    Rehab Prognosis: Good; patient would benefit from acute skilled PT services to address these deficits and reach maximum level of function.    Recent Surgery: * No surgery found *      Plan:     During this hospitalization, patient to be seen   to address the identified rehab impairments via gait training, therapeutic activities, therapeutic exercises, neuromuscular re-education and progress toward the following goals:    Plan of Care Expires:  04/22/24    Subjective     Chief Complaint: I am weak; reports being tired following amb  Patient/Family Comments/goals: to return home  Pain/Comfort:  Pain Rating 1: 9/10  Location - Side 1: Bilateral  Location - Orientation 1: generalized  Location 1:   (reports h/o fibromyalgia and has pain in all muscles)  Pain Addressed 1: Reposition, Distraction, Cessation of Activity, Nurse notified  Pain Rating Post-Intervention 1:  (not rated)  Pain Rating 2:  (did not c/o HA initially but reported later that she has been having a HA but it was the R side of the head and neck; now it's just the R side of neck)  Location - Side 2: Right  Location - Orientation 2:  (temporal region)  Location 2: head  Pain Addressed 2: Cessation of Activity, Nurse notified, Distraction  Pain Rating Post-Intervention 2:  (7-8/10)    Patients cultural, spiritual, Jainism conflicts given the current situation: no    Living Environment:  pt lives alone in a SSH with 8 steps with BHR; t/s combo.  Previous level of function: Indep ADLS, ambulation, drives short distances, cooks, does household chores-make bed, cooks, laundry.  Roles and Routines: active, mother, sister, active homemaker  Equipment Used at Home: none (pt has access to BSC, RW, rollator, spc, shower chair but was not using any DME)  Assistance upon Discharge: sister, daughter    Objective:     Communicated with nurse prior to session.  Patient found  sitting EOB with OT  with telemetry, oxygen, bed alarm  upon PT entry to room.    General Precautions: Standard, fall, NPO, hearing impaired  Orthopedic Precautions:N/A   Braces: N/A  Respiratory Status: Nasal cannula, flow 0.5 L/min    Exams:  Cognitive Exam:  Patient is oriented to Person, Place, Time, Situation, and follows one and two step commands  Gross Motor Coordination:  reduced speed; limping with amb without AD  Postural Exam:  Patient presented with the following abnormalities:    -       Rounded shoulders  Sensation:    -       Intact  RLE ROM: WFL  RLE Strength: WFL  LLE ROM: WFL  LLE Strength: WFL    Functional Mobility:  Bed Mobility:  pt already sitting at EOB  Transfers:     Sit to Stand:  stand by assistance with no AD and with RW; instructed on hand placement for  safety  Bed to Chair: stand by assistance with  rolling walker  using  Step Transfer  Gait: Patient amb SBA without AD 6ft x 2 with fatigue and limp on return; introduced RW and amb with SBA for 40ft with improved fluidity/stability, VCs for upright posture and to keep RW close, feet   Balance: sit static~good; sit dynamic~good; stand static~fair+; dynamic stand~fair      AM-PAC 6 CLICK MOBILITY  Total Score:18       Treatment & Education:  Patient educated on purpose of PT/POC; performed gait training as above; pt educated on home safety with pacing self and pursed lip breathing for SOB; using warm water in shower to keep down the steam or let the door cracked; using magdalene cloth robe for ease drying off, keeping daily use items at height between hips and shoulders for safety, rest breaks as needed during amb with PLB as needed; performing AROM ex for LEs/UEs and combining with pursed lip breathing.    Patient left up in chair reclined with all lines intact, call button in reach, nurse notified, and sitter present. Educated pt in calling for assist of staff if she wants to return to bed to be safe- pt gave good verbal understanding.    GOALS:   Multidisciplinary Problems       Physical Therapy Goals          Problem: Physical Therapy    Goal Priority Disciplines Outcome Goal Variances Interventions   Physical Therapy Goal     PT, PT/OT Ongoing, Progressing     Description: Goals to be met by: 2024     Patient will increase functional independence with mobility by performin. Supine to sit with Modified [place]  2. Sit to supine with Modified [place]  3. Rolling with Modified [place].  4. Sit to stand transfer with Modified [place]  5. Bed to chair transfer with Modified [place] using LRAD  6. Gait  x 80 feet with Modified [place] using LRAD.   7. Ascend/descend 8 stair with Bilateral Handrails Modified [place] using LRAD.   8. Lower extremity exercise program  x10 reps with independence                         History:     Past Medical History:   Diagnosis Date    Anxiety     CKD (chronic kidney disease) stage 3, GFR 30-59 ml/min     COPD (chronic obstructive pulmonary disease)     Depression     Encounter for blood transfusion     Fibromyalgia     Hematuria     High cholesterol     Hypertension     Oral cancer     Proteinuria     Sore throat 7/3/2019    Urinary tract infection     Vitamin D deficiency        Past Surgical History:   Procedure Laterality Date    ABDOMINAL SURGERY      [place]    HYSTERECTOMY      INSERTION OF IMPLANTABLE LOOP RECORDER N/A 10/15/2021    Procedure: Insertion, Implantable Loop Recorder;  Surgeon: Cristhian Alvares MD;  Location: [facility] CATH LAB/EP;  Service: Cardiology;  Laterality: N/A;    IRRIGATION AND DEBRIDEMENT OF LOWER EXTREMITY Right 11/9/2022    Procedure: IRRIGATION AND DEBRIDEMENT, LOWER EXTREMITY, SECOND TOE;  Surgeon: Alfa Quintero DPM;  Location: [facility] OR;  Service: Podiatry;  Laterality: Right;  micro sagittal saw, vancomycin powder    OOPHORECTOMY      only one removed    Stomach Ulcer Surgery         Time Tracking:     PT Received On: 03/22/24  PT Start Time: 1148     PT Stop Time: 1229  PT Total Time (min): 41 min     Billable Minutes: Evaluation 12 and Gait Training 15 Self Care 14      03/22/2024

## 2024-03-22 NOTE — PLAN OF CARE
SW met with Pt and Pt sister Sonia [phone] at bedside.  Pt demographics confirmed. Pt independent with ADL's. Pt reports no need for HHS or DME. Pt does use a nebulizer. Pt not a dialysis or Coumadin Pt. Pt lives alone. Pt family will transport Pt home at D/C. No other needs identified. SW to request PCP f/u. SW to assist with any future needs.         [place] - Telemetry  Discharge Assessment    Primary Care Provider: Tonya Vega MD     Discharge Assessment (most recent)       BRIEF DISCHARGE ASSESSMENT - 03/22/24 5906          Discharge Planning    Assessment Type Discharge Planning Brief Assessment (P)      Support Systems Children;Family members (P)      Equipment Currently Used at Home nebulizer (P)      Current Living Arrangements home (P)      Patient/Family Anticipates Transition to home;home with family (P)      Patient/Family Anticipated Services at Transition none (P)      DME Needed Upon Discharge  none (P)      Discharge Plan A Home with family;Home (P)                          Angela Breaux LMSW  Phone: [phone]  [facility]

## 2024-03-22 NOTE — NURSING
Ordered bilateral SCDs placed. Non-slip socks place. Fall band placed. Restricted limb band to left arm placed for now due to infiltrated arm observed on initial assessment from ER.

## 2024-03-22 NOTE — PT/OT/SLP EVAL
Occupational Therapy   Evaluation, tx    Name: Autumn Harris  MRN: [id number]  Admitting Diagnosis: Weakness  Recent Surgery: * No surgery found *    The primary encounter diagnosis was Weakness. Diagnoses of Chest pain, Nausea and vomiting, unspecified vomiting type, Acute cystitis without hematuria, and SOB (shortness of breath) were also pertinent to this visit.    Recommendations:     Discharge Recommendations: Low Intensity Therapy  Discharge Equipment Recommendations:  none  Barriers to discharge:  None    Assessment:     Autumn Harris is a 78 y.o. female with a medical diagnosis of Weakness.  She presents with Performance deficits affecting function: weakness, impaired endurance, impaired self care skills, impaired functional mobility, gait instability, impaired balance, decreased coordination, decreased safety awareness, pain, impaired cardiopulmonary response to activity.      Pt verbalized feeling weak and fatigued. She was visibly frail and thin. Her SpO2 96% at rest on .5L, 92-94% on RA with activity. Her sister was present and she indicated that she will be helping pt at home when she is discharged. Pt performed fx mobility and self care activities with SBA. Pt ambulated initially in room SBA without a device to sink however after standing briefly at sink she was fatigued and ambulated with a slight limp on return to bed. Pt was introduced to a RW which increased her stability and distance walking. Pt is not back to her baseline of [place]. She would benefit from Low Intensity Therapy. Continue with OT POC..     Rehab Prognosis: Good; patient would benefit from acute skilled OT services to address these deficits and reach maximum level of function.       Plan:     Patient to be seen 5 x/week to address the above listed problems via self-care/home management, therapeutic activities, therapeutic exercises  Plan of Care Expires: 04/22/24  Plan of Care Reviewed with: patient, sibling    Subjective      Chief Complaint: I am weak and tired.  Patient/Family Comments/goals: pt agreeable to OT after further education in the impt of OOB activity.    Occupational Profile:  Living Environment: pt lives alone in a SSH with 8 steps with BHR; t/s combo.  Previous level of function: Indep ADLS, ambulation, drives short distances, cooks, does household chores-make bed, cooks, laundry.  Roles and Routines: active, mother, sister, active homemaker  Equipment Used at Home: none (pt has access to BSC, RW, rollator, spc, shower chair but was not using any DME)  Assistance upon Discharge: sister, daughter    Pain/Comfort:  Pain Rating 1: 9/10  Location - Side 1: Bilateral  Location - Orientation 1: generalized  Location 1:  (pt has hx FM and has pain in all muscles per her report.)  Pain Addressed 1: Reposition, Distraction, Cessation of Activity, Nurse notified  Pain Rating Post-Intervention 1:  (not rated)    Patients cultural, spiritual, Caodaism conflicts given the current situation: no    Objective:     Communicated with: nurse prior to session.  Patient found HOB elevated with telemetry, oxygen, bed alarm upon OT entry to room.    General Precautions: Standard, fall, NPO, hearing impaired  Orthopedic Precautions: N/A  Braces: N/A  Respiratory Status: Nasal cannula, flow .5 L/min    Occupational Performance:    Bed Mobility:  extra time and effort  Patient completed Rolling/Turning to Left with  stand by assistance  Patient completed Scooting/Bridging with stand by assistance  Patient completed Supine to Sit with stand by assistance    Functional Mobility/Transfers:  Patient completed Sit <> Stand Transfer with stand by assistance  with  no assistive device  Patient completed Bed <> Chair Transfer using Step Transfer technique with stand by assistance with rolling walker  Functional Mobility: ambulated SBA without AD ~6' x 2 with fatigue and limp on return; RW introduced , ambulated SBA longer distance, improved  stability, vc for upright posture, pace was slow..    Activities of Daily Living:  Feeding:  NPO    Grooming: stand by assistance washed hands then was fatigued ambulating back; already brushed teeth and washed face  earlier  Upper Body Dressing: modified independence donned robe seated EOB  Lower Body Dressing: modified independence socks donned/doffed seated EOB  Toileting: declined use      Cognitive/Visual Perceptual:  Cognitive/Psychosocial Skills:     -       Oriented to: Person, Place, Time, and Situation   -       Follows Commands/attention:Follows one-step commands  -       Communication: clear/fluent  -       Memory: Poor immediate recall  -       Safety awareness/insight to disability: impaired   -       Mood/Affect/Coping skills/emotional control: Cooperative  Visual/Perceptual:      -Intact .    Physical Exam:  Balance:    -       siting: good  dynamic: good  standing; fair plus  dynamic: fair  Postural examination/scapula alignment:    -       Rounded shoulders  Dominant hand:    -       right  Upper Extremity Range of Motion:     -       Right Upper Extremity: WFL  -       Left Upper Extremity: WFL  Upper Extremity Strength:    -       Right Upper Extremity: WFL  -       Left Upper Extremity: WFL   Strength:    -       Right Upper Extremity: WFL  -       Left Upper Extremity: WFL    AMPAC 6 Click ADL:  AMPAC Total Score: 22    Treatment & Education:  Purpose of OT and POC  Family education/instruction with sister/caregiver at dc.  All questions/ concerns addressed within scope.   Fall prevention strategies for home: use shower chair, place BSC  next to bed for nighttime toileting until cleared by HH therapist to distance walk at nighttime to toilet.      Patient left sitting edge of bed with PT with all lines intact, call button in reach, nurse about pt request for pain med for her FM notified, and PT and sister present    GOALS:   Multidisciplinary Problems       Occupational Therapy Goals           Problem: Occupational Therapy    Goal Priority Disciplines Outcome Interventions   Occupational Therapy Goal     OT, PT/OT Ongoing, Progressing    Description: Goals to be met by: 4/10/2024     Patient will increase functional independence with ADLs by performing:    UE Dressing with Modified [place].  LE Dressing with Modified [place].  Grooming while standing at sink with Modified [place].  Toileting from toilet with Modified [place] for hygiene and clothing management.   Step transfer with Modified [place]  Toilet transfer to toilet with Modified [place].  Upper extremity exercise program x10 reps per handout, with independence.                         History:     Past Medical History:   Diagnosis Date    Anxiety     CKD (chronic kidney disease) stage 3, GFR 30-59 ml/min     COPD (chronic obstructive pulmonary disease)     Depression     Encounter for blood transfusion     Fibromyalgia     Hematuria     High cholesterol     Hypertension     Oral cancer     Proteinuria     Sore throat 7/3/2019    Urinary tract infection     Vitamin D deficiency          Past Surgical History:   Procedure Laterality Date    ABDOMINAL SURGERY      [place]    HYSTERECTOMY      INSERTION OF IMPLANTABLE LOOP RECORDER N/A 10/15/2021    Procedure: Insertion, Implantable Loop Recorder;  Surgeon: Cristhian Alvares MD;  Location: [facility] CATH LAB/EP;  Service: Cardiology;  Laterality: N/A;    IRRIGATION AND DEBRIDEMENT OF LOWER EXTREMITY Right 11/9/2022    Procedure: IRRIGATION AND DEBRIDEMENT, LOWER EXTREMITY, SECOND TOE;  Surgeon: Alfa Quintero DPM;  Location: [facility] OR;  Service: Podiatry;  Laterality: Right;  micro sagittal saw, vancomycin powder    OOPHORECTOMY      only one removed    Stomach Ulcer Surgery         Time Tracking:     OT Date of Treatment: 03/22/24  OT Start Time: 1135  OT Stop Time: 1205  OT Total Time (min): 30 min    Billable Minutes:Evaluation 10  Self Care/Home Management 20  Total  Time 30    3/22/2024

## 2024-03-22 NOTE — PLAN OF CARE
[facility] Telemetry      HOME HEALTH ORDERS  FACE TO FACE ENCOUNTER    Patient Name: Autumn Harris  YOB: 1945    PCP: Tonya Vega MD   PCP Address: [address] [place] [address] [id number]  PCP Phone Number: [phone]  PCP Fax: [phone]    Encounter Date: 3/21/24    Admit to Home Health    Diagnoses:  Active Hospital Problems    Diagnosis  POA    *Weakness [R53.1]  Yes    Microcytosis [R71.8]  Yes    Acute cystitis [N30.00]  Yes    Paroxysmal atrial fibrillation [I48.0]  Yes    SOB (shortness of breath) [R06.02]  Yes      Resolved Hospital Problems   No resolved problems to display.       Follow Up Appointments:  No future appointments.    Allergies:  Review of patient's allergies indicates:   Allergen Reactions    Pcn [penicillins]     Sulfa (sulfonamide antibiotics)        Medications: Review discharge medications with patient and family and provide education.      I have seen and examined this patient within the last 30 days. My clinical findings that support the need for the home health skilled services and home bound status are the following:no   Requiring assistive device to leave home due to unsteady gait caused by  Weakness/Debility.     Diet:   diabetic diet 2200 calorie    Labs:      Referrals/ Consults  Physical Therapy to evaluate and treat. Evaluate for home safety and equipment needs; Establish/upgrade home exercise program. Perform / instruct on therapeutic exercises, gait training, transfer training, and Range of Motion.  Occupational Therapy to evaluate and treat. Evaluate home environment for safety and equipment needs. Perform/Instruct on transfers, ADL training, ROM, and therapeutic exercises.    Activities:   activity as tolerated    Nursing:   Agency to admit patient within 24 hours of hospital discharge unless specified on physician order or at patient request    SN to complete comprehensive assessment including routine vital signs. Instruct on disease process  and s/s of complications to report to MD. Review/verify medication list sent home with the patient at time of discharge  and instruct patient/caregiver as needed. Frequency may be adjusted depending on start of care date.     Skilled nurse to perform up to 3 visits PRN for symptoms related to diagnosis    Notify MD if SBP > 160 or < 90; DBP > 90 or < 50; HR > 120 or < 50; Temp > 101; O2 < 88%; Other:       Ok to schedule additional visits based on staff availability and patient request on consecutive days within the home health episode.    Miscellaneous       Home Health Aide:  Physical Therapy Three times weekly and Occupational Therapy Three times weekly    Wound Care Orders  no    I certify that this patient is confined to her home and needs physical therapy and occupational therapy.      Laura Poole MD

## 2024-03-22 NOTE — NURSING
Pt had 10 beat run V-tach, appears asleep, very easy to arouse, denies any CP, palpitations or any discomfort. /75 HR back to SR 75 at present. Dr Garcia called and notified. No orders given. Will CTCM.

## 2024-03-22 NOTE — PHARMACY MED REC
"  [facility]           Pharmacy  Admission Medication History     The home medication history was taken by Asuncion Miranda.      Medication history obtained from Medications listed below were obtained from: [employer] software- [employer]    Based on information gathered for medication list, you may go to "Admission" then "Reconcile Home Medications" tabs to review and/or act upon those items.     The home medication list has been updated by the Pharmacy department.   Please read ALL comments highlighted in yellow.   Please address this information as you see fit.    Feel free to contact us if you have any questions or require assistance.    The medications listed below were removed from the home medication list.  Please reorder if appropriate:    Patient reports NOT TAKING the following medication(s):  Ventolin hfa  Flonase nasal  Norco 10-325mg  Xyzal 5mg  Kenalog cream        No current facility-administered medications on file prior to encounter.     Current Outpatient Medications on File Prior to Encounter   Medication Sig Dispense Refill    azelastine (ASTELIN) 137 mcg (0.1 %) nasal spray 2 sprays by Nasal route 2 (two) times daily.  5    busPIRone (BUSPAR) 5 MG Tab Take 5 mg by mouth 2 (two) times daily.      clorazepate (TRANXENE) 3.75 MG Tab Take 7.5 mg by mouth 2 (two) times daily. 2 tablets      montelukast (SINGULAIR) 10 mg tablet Take 10 mg by mouth once daily.      pantoprazole (PROTONIX) 40 MG tablet TAKE 1 TABLET BY MOUTH EVERY DAY (Patient taking differently: Take 40 mg by mouth once daily.) 90 tablet 3    tramadol-acetaminophen 37.5-325 mg (ULTRACET) 37.5-325 mg Tab Take 1 tablet by mouth 3 (three) times daily.      blood sugar diagnostic Strp 1 strip by Misc.(Non-Drug; Combo Route) route 3 (three) times daily as needed. 30 each 3    blood-glucose meter kit Use as instructed 1 each 0    ergocalciferol (ERGOCALCIFEROL) 50,000 unit Cap Take 50,000 Units by mouth every Monday.   "       Please address this information as you see fit.  Feel free to contact us if you have any questions or require assistance.    Asuncion Miranda  [phone]                .

## 2024-03-22 NOTE — PLAN OF CARE
Problem: Occupational Therapy  Goal: Occupational Therapy Goal  Description: Goals to be met by: 4/10/2024     Patient will increase functional independence with ADLs by performing:    UE Dressing with Modified [place].  LE Dressing with Modified [place].  Grooming while standing at sink with Modified [place].  Toileting from toilet with Modified [place] for hygiene and clothing management.   Step transfer with Modified [place]  Toilet transfer to toilet with Modified [place].  Upper extremity exercise program x10 reps per handout, with independence.    Outcome: Ongoing, Progressing

## 2024-03-22 NOTE — PLAN OF CARE
Problem: Physical Therapy  Goal: Physical Therapy Goal  Description: Goals to be met by: 2024     Patient will increase functional independence with mobility by performin. Supine to sit with Modified [place]  2. Sit to supine with Modified [place]  3. Rolling with Modified [place].  4. Sit to stand transfer with Modified [place]  5. Bed to chair transfer with Modified [place] using LRAD  6. Gait  x 80 feet with Modified [place] using LRAD.   7. Ascend/descend 8 stair with Bilateral Handrails Modified [place] using LRAD.   8. Lower extremity exercise program x10 reps with independence    Outcome: Ongoing, Progressing   Overlap co-tx with OT in anticipation of decreased tolerance to activity; will recommend HH PT/OT to maximize functional independence; pt has all needed equipment and pt's sister will be available for any assistance needed; at this time, pt is supervision with bed mobility, SBA with OOB activity; recommend use of RW at home 2/2 pt's fatigue, decreased balance; instructed in pacing and pursed lip breathing for SOB; pt able to amb~40ft using RW with SBA; O2 sats 95% on RA at rest; 92-93% with amb on RA; 93-94% post amb on RA; will cont with POC.

## 2024-03-22 NOTE — PROGRESS NOTES
"[facility] Medicine Progress Note    Primary Team: hospitals Hospitalist Team B  Attending Physician: Mejia Conley MD  Resident: Zulema  Intern: Skyler    Subjective:      NAEON. Patient reports feeling better today. No new or worsening symptoms.      Objective:     Last 24 Hour Vital Signs:  BP  Min: 136/69  Max: 172/80  Temp  Av.1 °F (36.7 °C)  Min: 97.5 °F (36.4 °C)  Max: 98.8 °F (37.1 °C)  Pulse  Av.9  Min: 69  Max: 117  Resp  Av.9  Min: 17  Max: 25  SpO2  Av.4 %  Min: 91 %  Max: 99 %  Height  Av' 3" (160 cm)  Min: 5' 3" (160 cm)  Max: 5' 3" (160 cm)  Weight  Av.2 kg (112 lb 14 oz)  Min: 51.2 kg (112 lb 14 oz)  Max: 51.2 kg (112 lb 14 oz)  I/O last 3 completed shifts:  In: 120 [P.O.:120]  Out: -     Physical Examination:  Examination  General: Patient sitting comfortably in NAD  Head: normocephalic, atraumatic  Eyes: PERRL, EOMI  Mouth: MMM, posterior oropharynx with no erythema  Neck: No thyromegaly or masses   Cardiac: RRR, no murmurs appreciated, no extra heart sounds  Pulses: Dorsalis pedis, Posterior tibial, and radial pulses 2+ and symmetric  Pulmonary/Chest: CTAB, symmetric chest rise, no wheezing or crackles  GI: Soft, non tender, non distended, normoactive bowel sounds  Extremities: no edema, clubbing, or cyanosis  Skin: dry, warm, intact. No bruising or rashes.  Neuro: Alert and oriented, moving all extremities, sensation equal and symmetric     Laboratory:  Laboratory Data   Recent Labs   Lab 24  1215 24  1514 24  2056 24  0019 24  0405 24  0820   WBC 7.56  --   --   --  5.76  --    HGB 12.1  --   --   --  11.1*  --    HCT 39.2  --   --   --  35.9*  --      --   --   --  240  --    MCV 77*  --   --   --  77*  --      --   --   --  139  --    K 4.2  --   --   --  4.2  --      --   --   --  108  --    CO2 20*  --   --   --  21*  --    BUN 19  --   --   --  17  --    CREATININE 1.4  --   --   --  1.2  --    GLU 77  " "--   --   --  85  --    CALCIUM 9.9  --   --   --  9.7  --    PROT 8.0  --   --   --  6.9  --    ALBUMIN 3.8  --   --   --  3.2*  --    PHOS  --   --   --   --  3.2  --    MG  --   --   --   --  1.9  --    AST 33  --   --   --  24  --    ALT 20  --   --   --  15  --    ALKPHOS 108  --   --   --  92  --    TROPONINI 0.011   < > 0.012 0.012 0.006 <0.006   *  --   --   --   --   --     < > = values in this interval not displayed.     Invalid input(s): "POCTGLU"  Recent Labs   Lab 03/22/24  0405   HGBA1C 5.6       Microbiology Data   Urine culture pending     Other Results:  EKG : reviewed    Radiology Data  CT Abdomen Pelvis With IV Contrast NO Oral Contrast   Final Result      No evidence of small-bowel obstruction.      Prominent intrahepatic and extrahepatic biliary ducts without definite obstructive process seen, the pancreatic duct is also slightly enlarged.  It is unchanged from the prior study of 11/25/2021 exam.  To correlate with liver enzymes, an MRCP could be done if clinically warranted.      Several hypoattenuating lesions of the left kidney, too small to characterize favored to represent cysts, not significantly changed from the prior study.      Cholecystectomy.      Hysterectomy.         Electronically signed by: Kaye Michel MD   Date:    03/21/2024   Time:    16:06      X-Ray Chest 1 View   Final Result      Please see above         Electronically signed by: Cliff Lira DO   Date:    03/21/2024   Time:    13:26      CTA Chest Non-Coronary (PE Studies)    (Results Pending)       Current Medications:     Infusions:       Scheduled:   aspirin  81 mg Oral Daily    atorvastatin  40 mg Oral QHS    azelastine  2 spray Nasal BID    busPIRone  5 mg Oral BID    ciprofloxacin HCl  500 mg Oral Daily    clorazepate  3.75 mg Oral BID    enoxparin  30 mg Subcutaneous Q24H (prophylaxis, 1700)    ferrous sulfate  1 tablet Oral Daily    montelukast  10 mg Oral Daily    pantoprazole  40 mg Oral Daily "        PRN:  acetaminophen, dextrose 10%, dextrose 10%, glucagon (human recombinant), glucose, glucose, naloxone, ondansetron, sodium chloride 0.9%, tramadol-acetaminophen 37.5-325 mg    Antibiotics and Day Number of Therapy:  Ciprofloxacin 500 daily    Lines and Day Number of Therapy:  PIV x 1     Assessment:     Autumn Harris is a 78 y.o. female with Afib, HTN, CKD3, COPD, HLD, fibromyalgia, depression, anxiety, h/o oral cancer who presents to [facility] for several days of weakness, fatigue, SOB, and chest pain. EKG with normal sinus rhythm and unchanged from previous tracings, CXR without opacities, consolidation, vascular congestion. Initial two troponin within normal limits, but subsequent results increased to 0.038. UA positive for nitrate and WBCs. Admitted to medicine for further workup and management.      Plan:     NSTEMI  Dyspnea  Patient reports ~ 3d chest pressure with shoulder and neck pain that worsens with activity  - Initial EKG with normal sinus rhythm, LVH, no significant ST segment changes, unchanged from previous tracings; repeat with no significant change  - CXR with coarse interstitial opacities, no new opacities or consolidation  -   - Troponin 0.011 -> <0.006 -> 0.038 -> 0.012 x 2 -> 0.006 -> < 0.006  - Last TTE in 2019 with EF 70%, grade I diastolic dysfunction   - TTE pending   - s/p aspirin 325 mg, continue daily aspirin 81 mg   - Continue atorvastatin 40 mg daily   - CT PE study today      UTI   Patient reporting dysuria   - UA with nitrate, 1+ LE, 16 WBC  - Urine culture pending   - Continue ciprofloxacin 500 mg daily x 3 d total     HTN  - On presentation /95   - Home medication: spironolactone 12.5 mg daily   - Holding home spironolactone given renal function      COPD   - Home montelukast 10 mg, albuterol PRN  - Continue home montelukast 10 mg      Afib  - Patient has asymptomatic device-detected Afib of a low burden   - Per most recent cardiology appointment not  recommended for anticoagulation yet     Depression/Anxiety   - Continue home clorazepate 3.75 mg BID, Buspar 5mg BID    Fibromyalgia  - Continue home tramadol- acetaminophen 37.5 - 300 TID PRN     HCM   - Lipid panel: Chol 141, HDL 46, LDL 82, Trig 65  - A1c 5.6  - TSH 1.32     Code Status: Full  DVT Prophylaxis: Lovenox  Diet: NPO  Disposition: Pending CT PE Study     Inpatient Upgrade Note    Autumn Harris has warranted treatment spanning two or more midnights of hospital level care for the management of chest pain. She continues to require daily labs and further testing/imaging. Her condition is also complicated by the following comorbidities: Hypertension and Chronic respiratory disease.      Parminder Holland MD, PhD  [facility] PGY1  [facility] Medicine Team B      [facility] Medicine Hospitalist Pager numbers:   [facility] Hospitalist Medicine Team A (Maximo/John): [phone]  [facility] Hospitalist Medicine Team B (Tamie/Yael):  [phone]

## 2024-03-22 NOTE — CONSULTS
"  [facility] - Telemetry  Adult Nutrition  Consult Note    SUMMARY     Recommendations  Recommendation:   1. Change diet to cardiac   2. Add Boost plus BID   3. Monitor weight and labs    Goals: Pt will consume 50-75% of meals by RD follow up    Nutrition Goal Status: new  Communication of RD Recs:  (POC)    Assessment and Plan  Nutrition Problem  Inadequate energy intake     Related to (etiology):   Dx related symptoms     Signs and Symptoms (as evidenced by):   Noted 10 lb weight loss in 4 months, reported 20 lb weight loss in 6 months     Interventions:  Collaboration with other providers   Commercial beverage    Nutrition Diagnosis Status:   New    Reason for Assessment  Reason For Assessment: consult (20 lb weight loss in 6 months)  Diagnosis: other (see comments) (weakness)  Relevant Medical History: HTN, high cholesterol, oral cancer, depression, anxiety, COPD, fibromyalgia, UTI, CKD3, porteinuria, hematuria, Vit D deficiency  Interdisciplinary Rounds: did not attend  General Information Comments: Pt is currently on a regular diet. ketan-18/skin intact. No meal intake noted. Noted 10 lb weight loss in 4 months. Unable to conduct NFPE at time of visit d/t pt off unit/out of room  Nutrition Discharge Planning: d/c on cardiac diet    Nutrition Risk Screen  Nutrition Risk Screen: no indicators present    Nutrition/Diet History  Patient Reported Diet/Restrictions/Preferences: heart healthy  Food Preferences: HARISH  Factors Affecting Nutritional Intake: None identified at this time    Nutrition Related Social Determinants of Health: SDOH: Unable to assess at this time.      Anthropometrics  Temp: 98.8 °F (37.1 °C)  Height Method: Stated  Height: 5' 3" (160 cm)  Height (inches): 63 in  Weight Method: Bed Scale  Weight: 51.2 kg (112 lb 14 oz)  Weight (lb): 112.88 lb  Ideal Body Weight (IBW), Female: 115 lb  % Ideal Body Weight, Female (lb): 98.16 %  BMI (Calculated): 20  BMI Grade: 18.5-24.9 - normal  Usual Body Weight " (UBW), k.3 kg (11/3/24)  % Usual Body Weight: 92.78  % Weight Change From Usual Weight: -7.41 %     Lab/Procedures/Meds  Pertinent Labs Reviewed: reviewed  Pertinent Labs Comments: GFR 46, CO2 21  Pertinent Medications Reviewed: reviewed  Pertinent Medications Comments: atorvastatin, cirpfloxacin, enoxaparin, ferrous sulfate, pantoprazole    Estimated/Assessed Needs  Weight Used For Calorie Calculations: 51.2 kg (112 lb 14 oz)  Energy Calorie Requirements (kcal): 1536 kcals (30 kcals/kg)  Energy Need Method: Kcal/kg  Protein Requirements: 61g (1.2g/kg)  Weight Used For Protein Calculations: 51.2 kg (112 lb 14 oz)     Estimated Fluid Requirement Method: RDA Method  RDA Method (mL): 1536     Nutrition Prescription Ordered  Current Diet Order: Regular Diet    Evaluation of Received Nutrient/Fluid Intake  I/O: 120/0  Energy Calories Required: not meeting needs  Protein Required: not meeting needs  Fluid Required: not meeting needs  Comments: LBM-3/20/24  Tolerance: not tolerating  % Intake of Estimated Energy Needs: Other: No meal intake recorded  % Meal Intake: Other: No meal intake recordded    Nutrition Risk  Level of Risk/Frequency of Follow-up: low - moderate (1x/weekly)     Monitor and Evaluation  Food and Nutrient Intake: energy intake, food and beverage intake  Food and Nutrient Adminstration: diet order  Anthropometric Measurements: weight, weight change, body mass index  Biochemical Data, Medical Tests and Procedures: electrolyte and renal panel, gastrointestinal profile, inflammatory profile, lipid profile     Nutrition Follow-Up  RD Follow-up?: Yes

## 2024-03-22 NOTE — PLAN OF CARE
VN note: Care plan, orders and labs reviewed.    Problem: Adult Inpatient Plan of Care  Goal: Plan of Care Review  3/22/2024 1618 by Olena Gordillo RN  Outcome: Ongoing, Progressing  3/22/2024 1617 by Olena Gordillo RN  Outcome: Ongoing, Progressing  3/22/2024 0745 by Olena Gordillo RN  Outcome: Ongoing, Progressing     Problem: Adult Inpatient Plan of Care  Goal: Patient-Specific Goal (Individualized)  3/22/2024 1618 by Olena Gordillo RN  Outcome: Ongoing, Progressing  3/22/2024 1617 by Olena Gordillo RN  Outcome: Ongoing, Progressing  3/22/2024 0745 by Olena Gordillo RN  Outcome: Ongoing, Progressing

## 2024-03-22 NOTE — PROGRESS NOTES
Pharmacist Renal Dose Adjustment Note    Autumn Harris is a 78 y.o. female being treated with the medication ciprofloxacin    Patient Data:    Vital Signs (Most Recent):  Temp: 98.2 °F (36.8 °C) (03/21/24 1154)  Pulse: 80 (03/21/24 1835)  Resp: (!) 25 (03/21/24 1835)  BP: (!) 171/79 (03/21/24 1835)  SpO2: 95 % (03/21/24 1835) Vital Signs (72h Range):  Temp:  [98.2 °F (36.8 °C)]   Pulse:  []   Resp:  [17-25]   BP: (153-195)/(79-95)   SpO2:  [95 %-100 %]      Recent Labs   Lab 03/21/24  1215   CREATININE 1.4     Serum creatinine: 1.4 mg/dL 03/21/24 1215  Estimated creatinine clearance: 27.4 mL/min    Medication:ciprofloxacin dose: 250 mg frequency q12h will be changed to medication:ciprofloxacin dose:500 mg frequency:q24h    Pharmacist's Name: Sabrina Rader  Pharmacist's Extension: [phone]

## 2024-03-23 LAB — BACTERIA UR CULT: ABNORMAL

## 2024-03-23 NOTE — DISCHARGE SUMMARY
[facility] Medicine Discharge Summary    Primary Team: [facility] Hospitalist Team B  Attending Physician: Dr Conley  Resident: Patsy Poole  Intern: Lian Holland    Date of Admit: 3/21/2024  Date of Discharge: 3/22/2024    Discharge to: home/self care  Condition: stable    Discharge Diagnoses     Patient Active Problem List   Diagnosis    Essential hypertension    Chronic bronchitis    Anxiety    Nonspecific abnormal electrocardiogram (ECG) (EKG)    Premature atrial contractions    Dyspnea on exertion    Aortic valve insufficiency    Chest pain of uncertain etiology    Gastroesophageal reflux disease without esophagitis    Syncope    Hyponatremia    Hypokalemia    Hypomagnesemia    Acute abdominal pain    Generalized abdominal pain    Chronic gastritis without bleeding    SOB (shortness of breath)    History of squamous cell carcinoma    Sore throat    Other dysphagia    Transient confusion    Hypotension due to drugs    Open fracture dislocation of interphalangeal joint of single toe, right, initial encounter    Open displaced fracture of middle phalanx of lesser toe of right foot    Acute cystitis without hematuria    Paroxysmal atrial fibrillation    Microcytosis    Acute cystitis    Weakness       Consultants and Procedures     Consultants:  none    Procedures:   none    Imaging:  CXR (3/21) - Slight nonspecific elevation left lung base. Continued coarse interstitial opacities throughout the lungs which are nonspecific and may represent fibrosis and scarring. There is no new lung opacity. No large lung consolidation. No large pleural effusion or pneumothorax. Continued atherosclerotic aorta. Implanted presume cardiac device overlies the left thorax. Continued surgical clips in the epigastric region.      CT Abdomen Pelvis (3/21) - No evidence of small-bowel obstruction. Prominent intrahepatic and extrahepatic biliary ducts without definite obstructive process seen, the pancreatic duct is also slightly  enlarged.  It is unchanged from the prior study of 11/25/2021 exam.  To correlate with liver enzymes, an MRCP could be done if clinically warranted. Several hypoattenuating lesions of the left kidney, too small to characterize favored to represent cysts, not significantly changed from the prior study. Cholecystectomy. Hysterectomy.    CTA PE (3/22) - Impression:  1. No pulmonary embolism to the segmental level.  2. Left upper lobe pulmonary nodule measuring 3 mm.  Per 2017 Fleischner criteria, recommend follow-up CT in 12 months if the patient is high risk.  3. Emphysematous changes.    Brief History of Present Illness      Autumn Harris is a 78 y.o. female with Afib, HTN, CKD3, COPD, HLD, fibromyalgia, depression, anxiety, h/o oral cancer who presents to [facility] for several days of weakness, fatigue, SOB, and chest pain. EKG with normal sinus rhythm and unchanged from previous tracings, CXR without opacities, consolidation, vascular congestion. Initial two troponin within normal limits, but subsequent results increased to 0.038. UA positive for nitrate and WBCs. Admitted to medicine for further workup and management.  Patient started on 3 d course of ciprofloxacin. Troponins downtrended after one elevated result. TTE stable. CT PE with no PE to segmental level, OMER pulmonary nodule 3mm, emphysematous changes. Pt discharged home with cipro and iron supplement.     For the full HPI please refer to the History & Physical from this admission.    Hospital Course By Problem with Pertinent Findings     NSTEMI  Dyspnea  Patient reports ~ 3d chest pressure with shoulder and neck pain that worsens with activity  - Initial EKG with normal sinus rhythm, LVH, no significant ST segment changes, unchanged from previous tracings; repeat with no significant change  - CXR with coarse interstitial opacities, no new opacities or consolidation  -   - Troponin 0.011 -> <0.006 -> 0.038 -> 0.012 x 2 -> 0.006 -> < 0.006  -  Last TTE in 2019 with EF 70%, grade I diastolic dysfunction   - TTE 3/22 with LVEF 55-60%, grade I diastolic dysfunction   - s/p aspirin 325 mg, continue daily aspirin 81 mg   - Continue atorvastatin 40 mg daily   - CT PE study negative for acute pulmonary embolism     UTI   Patient reporting dysuria   - UA with nitrate, 1+ LE, 16 WBC  - Urine culture pending   - Continue ciprofloxacin 500 mg daily x 3 d total     HTN  - On presentation /95   - Home medication: spironolactone 12.5 mg daily   - Discontinue spironolactone given renal function      COPD   - Home montelukast 10 mg, albuterol PRN  - Continue home montelukast 10 mg      Afib  - Patient has asymptomatic device-detected Afib of a low burden   - Per most recent cardiology appointment not recommended for anticoagulation yet     Depression/Anxiety   - Continue home clorazepate 3.75 mg BID, Buspar 5mg BID     Fibromyalgia  - Continue home tramadol- acetaminophen 37.5 - 300 TID PRN     HCM   - Lipid panel: Chol 141, HDL 46, LDL 82, Trig 65  - A1c 5.6  - TSH 1.32    Discharge Exam     Physical exam:  General: NAD, Sitting comfortably in bed  Head: atraumatic, normocephalic  Eyes: no conjunctival injection, no scleral icterus, PERRL, EOMI  Mouth: MMM, no pharyngeal exudates  Throat/neck: Trachea midline and mobile, no pain on palpation, no thyromegaly  CV: RRR, no murmurs appreciated  Resp: CTAB, normal work of breathing  GI: Soft, nondistended, nontender   MSK: No gross abnormalities. No peripheral edema  Skin: warm, dry, intact. Non diaphoretic  Neuro: responds appropriately to questions, alert, oriented, moves all extremities spontaneously    Discharge Medications        Medication List        START taking these medications      ciprofloxacin HCl 500 MG tablet  Commonly known as: CIPRO  Take 1 tablet (500 mg total) by mouth every 12 (twelve) hours. for 5 doses     ferrous sulfate 324 mg (65 mg iron) Tbec  Take 1 tablet (324 mg total) by mouth every other  day.            CHANGE how you take these medications      pantoprazole 40 MG tablet  Commonly known as: PROTONIX  TAKE 1 TABLET BY MOUTH EVERY DAY  What changed: when to take this            CONTINUE taking these medications      azelastine 137 mcg (0.1 %) nasal spray  Commonly known as: ASTELIN     blood sugar diagnostic Strp  1 strip by Misc.(Non-Drug; Combo Route) route 3 (three) times daily as needed.     blood-glucose meter kit  Use as instructed     busPIRone 5 MG Tab  Commonly known as: BUSPAR     clorazepate 3.75 MG Tab  Commonly known as: TRANXENE     ergocalciferol 50,000 unit Cap  Commonly known as: ERGOCALCIFEROL     montelukast 10 mg tablet  Commonly known as: SINGULAIR     tramadol-acetaminophen 37.5-325 mg 37.5-325 mg Tab  Commonly known as: ULTRACET            STOP taking these medications      acarbose 25 MG Tab  Commonly known as: PRECOSE               Where to Get Your Medications        These medications were sent to [facility]  [address], [place] [id number]      Hours: Mon-Fri, 8a-5:30p Phone: [phone]   ciprofloxacin HCl 500 MG tablet  ferrous sulfate 324 mg (65 mg iron) Tbec         Discharge Information:   Diet:  regular    Physical Activity:  Resume regular activity as tolerated             Instructions:  1. Take all medications as prescribed  2. Keep all follow-up appointments  3. Return to the hospital or call your primary care physicians if any worsening symptoms such as fever, chest pain, shortness of breath, return of symptoms, or any other concerns.    Follow-Up Appointments:  Follow up with PCP  Follow up with cardiology      Tristen Beal MD  [facility] Internal Medicine, [place]

## 2024-03-26 ENCOUNTER — NURSE TRIAGE (OUTPATIENT)
Dept: ADMINISTRATIVE | Facility: CLINIC | Age: 79
End: 2024-03-26
Payer: MEDICARE

## 2024-03-26 LAB
OHS CV AF BURDEN PERCENT: < 1
OHS CV DC REMOTE DEVICE TYPE: NORMAL

## 2024-03-27 NOTE — TELEPHONE ENCOUNTER
"Caller states that they didn't give patient enough antibiotic tablets for her uti. She was only given 5 tablets and she took the last tablet today. Order reads "Take 1 tablet (500 mg total) by mouth every 12 (twelve) hours. for 5 doses". Patient has only been taking 1 tablet a day instead of 2 a day. Advised daughter that order was for 5 doses not 5 days and patient should have been taking 2 a day. Daughter advised to contact dr's office in the morning to inform dr of error and see what dr would decide to do. Please contact caller directly to discuss further care advice.     Reason for Disposition   [1] Caller has NON-URGENT medicine question about med that PCP prescribed AND [2] triager unable to answer question    Additional Information   Negative: MORE THAN A DOUBLE DOSE of a prescription or over-the-counter (OTC) drug   Negative: [1] DOUBLE DOSE (an extra dose or lesser amount) of prescription drug AND [2] any symptoms (e.g., dizziness, nausea, pain, sleepiness)   Negative: [1] DOUBLE DOSE (an extra dose or lesser amount) of over-the-counter (OTC) drug AND [2] any symptoms (e.g., dizziness, nausea, pain, sleepiness)   Negative: Took another person's prescription drug   Negative: [1] DOUBLE DOSE (an extra dose or lesser amount) of prescription drug AND [2] NO symptoms  (Exception: A double dose of antibiotics.)   Negative: Diabetes drug error or overdose (e.g., took wrong type of insulin or took extra dose)   Negative: [1] Prescription not at pharmacy AND [2] was prescribed by PCP recently (Exception: Triager has access to EMR and prescription is recorded there. Go to Home Care and confirm for pharmacy.)   Negative: [1] Pharmacy calling with prescription question AND [2] triager unable to answer question   Negative: [1] Caller has URGENT medicine question about med that PCP or specialist prescribed AND [2] triager unable to answer question   Negative: Medicine patch causing local rash or itching   Negative: [1] " Caller has medicine question about med NOT prescribed by PCP AND [2] triager unable to answer question (e.g., compatibility with other med, storage)   Negative: Prescription request for new medicine (not a refill)    Protocols used: Medication Question Call-A-AH

## 2024-03-28 ENCOUNTER — OFFICE VISIT (OUTPATIENT)
Dept: CARDIOLOGY | Facility: CLINIC | Age: 79
End: 2024-03-28
Payer: MEDICARE

## 2024-03-28 VITALS
WEIGHT: 110.81 LBS | HEART RATE: 81 BPM | HEIGHT: 63 IN | BODY MASS INDEX: 19.63 KG/M2 | SYSTOLIC BLOOD PRESSURE: 126 MMHG | DIASTOLIC BLOOD PRESSURE: 83 MMHG

## 2024-03-28 DIAGNOSIS — I95.1 SYNCOPE DUE TO ORTHOSTATIC HYPOTENSION: ICD-10-CM

## 2024-03-28 DIAGNOSIS — I70.0 AORTIC ATHEROSCLEROSIS: ICD-10-CM

## 2024-03-28 DIAGNOSIS — R06.02 SOB (SHORTNESS OF BREATH): Primary | ICD-10-CM

## 2024-03-28 DIAGNOSIS — J43.9 PULMONARY EMPHYSEMA, UNSPECIFIED EMPHYSEMA TYPE: ICD-10-CM

## 2024-03-28 LAB
OHS QRS DURATION: 98 MS
OHS QTC CALCULATION: 418 MS

## 2024-03-28 PROCEDURE — 99999 PR PBB SHADOW E&M-EST. PATIENT-LVL III: CPT | Mod: PBBFAC,,, | Performed by: INTERNAL MEDICINE

## 2024-03-28 PROCEDURE — 99215 OFFICE O/P EST HI 40 MIN: CPT | Mod: 25,S$GLB,, | Performed by: INTERNAL MEDICINE

## 2024-03-28 PROCEDURE — 93000 ELECTROCARDIOGRAM COMPLETE: CPT | Mod: S$GLB,,, | Performed by: INTERNAL MEDICINE

## 2024-03-28 RX ORDER — SPIRONOLACTONE 25 MG/1
12.5 TABLET ORAL DAILY
COMMUNITY

## 2024-03-28 NOTE — PROGRESS NOTES
"  Subjective:      Patient ID: Autumn Harris is a 78 y.o. female.    Chief Complaint: Shortness of Breath    HPI:  "I feel terrible"    Pt had exploratory laparotomy in June in [facility] for intestinal obstruction.  Initially pt was able to eat better without vomiting.    Now pt is not vomiting but pt has a poor appetite and pt often has difficulty swallowing.  "Food gets stuck" when swallowing.  Daughter reports pt has coughing paroxysms when eating.    Local gastroenterologist at [facility] is Dr Sunshine, last visit about 6 months ago.    Main problem is worsening shortness of breath over the past couple of weeks associated with severe weakness.  Oxygen level drops when sleeping.    Pt was recently hospitalized at [facility].  Pt was diagnosed with anemia.    Pt was given iron.   "My body does not absorb iron"    Pt recently saw Dr Alvares after loop recorder showed one 47 minute episode of atrial fibrillation.    Pt last fainted 11/23 while eating Thanksgiving dinner.  Pt was hospitalized in [place].  Sugar was low.  Pt was dx with hypoglycemia due to eating a lot of carbs.    Pt has lost 25 lbs over the past 6 months    "I have fibromyalgia and osteoarthritis"    Pulmonary MD is Dr Harden who has prescribed montelukast and albuterol MDI prn.    Pt quit smoking 1995        Review of Systems   Cardiovascular:  Positive for chest pain (Pt c/o mild persistent anterior chest pain for the past month), dyspnea on exertion and syncope. Negative for claudication, irregular heartbeat, leg swelling, near-syncope, orthopnea and palpitations.      Pt had oral cancer in the past.    Pt c/o not sleeping.    Pt states she was prescribed Buspar in the past for depression.  Pt does not feel it helps.    Past Medical History:   Diagnosis Date    Anxiety     CKD (chronic kidney disease) stage 3, GFR 30-59 ml/min     COPD (chronic obstructive pulmonary disease)     Depression     Encounter for blood transfusion     " Fibromyalgia     Hematuria     High cholesterol     Hypertension     Oral cancer     Proteinuria     Sore throat 7/3/2019    Urinary tract infection     Vitamin D deficiency         Past Surgical History:   Procedure Laterality Date    ABDOMINAL SURGERY      [place]    HYSTERECTOMY      INSERTION OF IMPLANTABLE LOOP RECORDER N/A 10/15/2021    Procedure: Insertion, Implantable Loop Recorder;  Surgeon: Cristhian Alvares MD;  Location: [facility] CATH LAB/EP;  Service: Cardiology;  Laterality: N/A;    IRRIGATION AND DEBRIDEMENT OF LOWER EXTREMITY Right 2022    Procedure: IRRIGATION AND DEBRIDEMENT, LOWER EXTREMITY, SECOND TOE;  Surgeon: Alfa Quintero DPM;  Location: [facility] OR;  Service: Podiatry;  Laterality: Right;  micro sagittal saw, vancomycin powder    OOPHORECTOMY      only one removed    Stomach Ulcer Surgery         Family History   Adopted: Yes   Problem Relation Age of Onset    Heart disease Mother     Pacemaker/defibrilator Brother     Heart disease Brother     Lung cancer Brother     Heart disease Maternal Aunt        Social History     Socioeconomic History    Marital status:    Tobacco Use    Smoking status: Former     Current packs/day: 0.00     Average packs/day: 1.5 packs/day for 45.0 years (67.5 ttl pk-yrs)     Types: Cigarettes     Start date: 1950     Quit date: 1995     Years since quittin.6    Smokeless tobacco: Never    Tobacco comments:     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   Substance and Sexual Activity    Alcohol use: No    Drug use: Never     Social Determinants of Health     Financial Resource Strain: Low Risk  (11/10/2022)    Overall Financial Resource Strain (CARDIA)     Difficulty of Paying Living Expenses: Not hard at all   Food Insecurity: No Food Insecurity (11/10/2022)    Hunger Vital Sign     Worried About Running Out of Food in the Last Year: Never true     Ran Out of Food in the Last Year: Never true   Transportation Needs: No Transportation  Needs (11/10/2022)    PRAPARE - Transportation     Lack of Transportation (Medical): No     Lack of Transportation (Non-Medical): No   Physical Activity: Unknown (11/10/2022)    Exercise Vital Sign     Days of Exercise per Week: Patient declined     Minutes of Exercise per Session: Patient declined   Stress: Unknown (11/10/2022)    [place] [employer] - Occupational Stress Questionnaire     Feeling of Stress : Patient declined   Social Connections: Unknown (11/10/2022)    Social Connection and Isolation Panel [NHANES]     Frequency of Communication with Friends and Family: More than three times a week     Frequency of Social Gatherings with Friends and Family: More than three times a week     Attends Voodoo Services: Patient declined     Active Member of Clubs or Organizations: Patient declined     Attends Club or Organization Meetings: Patient declined     Marital Status:    Housing Stability: Low Risk  (11/10/2022)    Housing Stability Vital Sign     Unable to Pay for Housing in the Last Year: No     Number of Places Lived in the Last Year: 1     Unstable Housing in the Last Year: No       Current Outpatient Medications on File Prior to Visit   Medication Sig Dispense Refill    azelastine (ASTELIN) 137 mcg (0.1 %) nasal spray 2 sprays by Nasal route 2 (two) times daily.  5    blood sugar diagnostic Strp 1 strip by Misc.(Non-Drug; Combo Route) route 3 (three) times daily as needed. 30 each 3    blood-glucose meter kit Use as instructed 1 each 0    busPIRone (BUSPAR) 5 MG Tab Take 5 mg by mouth 2 (two) times daily.      clorazepate (TRANXENE) 3.75 MG Tab Take 7.5 mg by mouth 2 (two) times daily. 2 tablets      ergocalciferol (ERGOCALCIFEROL) 50,000 unit Cap Take 50,000 Units by mouth every Monday.      ferrous sulfate 324 mg (65 mg iron) TbEC Take 1 tablet (324 mg total) by mouth every other day. 15 tablet 2    pantoprazole (PROTONIX) 40 MG tablet TAKE 1 TABLET BY MOUTH EVERY DAY  "(Patient taking differently: Take 40 mg by mouth once daily.) 90 tablet 3    spironolactone (ALDACTONE) 25 MG tablet Take 12.5 mg by mouth once daily.      tramadol-acetaminophen 37.5-325 mg (ULTRACET) 37.5-325 mg Tab Take 1 tablet by mouth 3 (three) times daily.      [DISCONTINUED] montelukast (SINGULAIR) 10 mg tablet Take 10 mg by mouth once daily.       No current facility-administered medications on file prior to visit.       Review of patient's allergies indicates:   Allergen Reactions    Pcn [penicillins]     Sulfa (sulfonamide antibiotics)      Objective:     Vitals:    03/28/24 0723   BP: 126/83   BP Location: Left arm   Patient Position: Sitting   BP Method: Medium (Automatic)   Pulse: 81   Weight: 50.3 kg (110 lb 12.5 oz)   Height: 5' 3" (1.6 m)        Physical Exam  Constitutional:       Appearance: She is well-developed.   Eyes:      General: No scleral icterus.  Neck:      Vascular: No carotid bruit or JVD.   Cardiovascular:      Rate and Rhythm: Normal rate and regular rhythm.      Heart sounds: No murmur heard.     No gallop.   Pulmonary:      Breath sounds: Rales (few crackles left base) present.   Musculoskeletal:      Right lower leg: No edema.      Left lower leg: No edema.   Skin:     General: Skin is warm and dry.   Neurological:      Mental Status: She is alert and oriented to person, place, and time.   Psychiatric:         Behavior: Behavior normal.         Thought Content: Thought content normal.         Judgment: Judgment normal.              Wt down 35 lbs since 11/22    Admission on 03/21/2024, Discharged on 03/22/2024   Component Date Value Ref Range Status    WBC 03/21/2024 7.56  3.90 - 12.70 K/uL Final    RBC 03/21/2024 5.12  4.00 - 5.40 M/uL Final    Hemoglobin 03/21/2024 12.1  12.0 - 16.0 g/dL Final    Hematocrit 03/21/2024 39.2  37.0 - 48.5 % Final    MCV 03/21/2024 77 (L)  82 - 98 fL Final    MCH 03/21/2024 23.6 (L)  27.0 - 31.0 pg Final    MCHC 03/21/2024 30.9 (L)  32.0 - 36.0 g/dL " Final    RDW 03/21/2024 17.4 (H)  11.5 - 14.5 % Final    Platelets 03/21/2024 300  150 - 450 K/uL Final    MPV 03/21/2024 10.4  9.2 - 12.9 fL Final    Immature Granulocytes 03/21/2024 0.1  0.0 - 0.5 % Final    Gran # (ANC) 03/21/2024 3.8  1.8 - 7.7 K/uL Final    Immature Grans (Abs) 03/21/2024 0.01  0.00 - 0.04 K/uL Final    Lymph # 03/21/2024 2.4  1.0 - 4.8 K/uL Final    Mono # 03/21/2024 1.0  0.3 - 1.0 K/uL Final    Eos # 03/21/2024 0.4  0.0 - 0.5 K/uL Final    Baso # 03/21/2024 0.11  0.00 - 0.20 K/uL Final    nRBC 03/21/2024 0  0 /100 WBC Final    Gran % 03/21/2024 49.7  38.0 - 73.0 % Final    Lymph % 03/21/2024 31.1  18.0 - 48.0 % Final    Mono % 03/21/2024 12.6  4.0 - 15.0 % Final    Eosinophil % 03/21/2024 5.0  0.0 - 8.0 % Final    Basophil % 03/21/2024 1.5  0.0 - 1.9 % Final    Differential Method 03/21/2024 Automated   Final    Sodium 03/21/2024 137  136 - 145 mmol/L Final    Potassium 03/21/2024 4.2  3.5 - 5.1 mmol/L Final    Chloride 03/21/2024 102  95 - 110 mmol/L Final    CO2 03/21/2024 20 (L)  23 - 29 mmol/L Final    Glucose 03/21/2024 77  70 - 110 mg/dL Final    BUN 03/21/2024 19  8 - 23 mg/dL Final    Creatinine 03/21/2024 1.4  0.5 - 1.4 mg/dL Final    Calcium 03/21/2024 9.9  8.7 - 10.5 mg/dL Final    Total Protein 03/21/2024 8.0  6.0 - 8.4 g/dL Final    Albumin 03/21/2024 3.8  3.5 - 5.2 g/dL Final    Total Bilirubin 03/21/2024 0.2  0.1 - 1.0 mg/dL Final    Alkaline Phosphatase 03/21/2024 108  55 - 135 U/L Final    AST 03/21/2024 33  10 - 40 U/L Final    ALT 03/21/2024 20  10 - 44 U/L Final    eGFR 03/21/2024 39 (A)  >60 mL/min/1.73 m^2 Final    Anion Gap 03/21/2024 15  8 - 16 mmol/L Final    BNP 03/21/2024 119 (H)  0 - 99 pg/mL Final    QRS Duration 03/21/2024 86  ms Final    OHS QTC Calculation 03/21/2024 452  ms Final    Troponin I 03/21/2024 0.011  0.000 - 0.026 ng/mL Final    Specimen UA 03/21/2024 Urine, Clean Catch   Final    Color, UA 03/21/2024 Yellow  Yellow, Straw, Eliz[place]et Final     Appearance, UA 03/21/2024 Clear  Clear Final    pH, UA 03/21/2024 6.0  5.0 - 8.0 Final    Specific Gravity, UA 03/21/2024 1.010  1.005 - 1.030 Final    Protein, UA 03/21/2024 Negative  Negative Final    Glucose, UA 03/21/2024 Negative  Negative Final    Ketones, UA 03/21/2024 Negative  Negative Final    Bilirubin (UA) 03/21/2024 Negative  Negative Final    Occult Blood UA 03/21/2024 Negative  Negative Final    Nitrite, UA 03/21/2024 Positive (A)  Negative Final    Urobilinogen, UA 03/21/2024 Negative  <2.0 EU/dL Final    Leukocytes, UA 03/21/2024 1+ (A)  Negative Final    Influenza A, Molecular 03/21/2024 Negative  Negative Final    Influenza B, Molecular 03/21/2024 Negative  Negative Final    Flu A & B Source 03/21/2024 Nasal swab   Final    SARS-CoV-2 RNA, Amplification, Qual 03/21/2024 Negative  Negative Final    Troponin I 03/21/2024 <0.006  0.000 - 0.026 ng/mL Final    RBC, UA 03/21/2024 1  0 - 4 /hpf Final    WBC, UA 03/21/2024 16 (H)  0 - 5 /hpf Final    Bacteria 03/21/2024 Rare  None-Occ /hpf Final    Squam Epithel, UA 03/21/2024 0  /hpf Final    Microscopic Comment 03/21/2024 SEE COMMENT   Final    Urine Culture, Routine 03/21/2024  (A)   Final                    Value:ESCHERICHIA COLI  >100,000 cfu/ml      Iron 03/21/2024 24 (L)  30 - 160 ug/dL Final    Transferrin 03/21/2024 324  200 - 375 mg/dL Final    TIBC 03/21/2024 480 (H)  250 - 450 ug/dL Final    Saturated Iron 03/21/2024 5 (L)  20 - 50 % Final    Ferritin 03/21/2024 18 (L)  20.0 - 300.0 ng/mL Final    Vitamin B-12 03/21/2024 579  210 - 950 pg/mL Final    Folate 03/21/2024 11.9  4.0 - 24.0 ng/mL Final    Troponin I 03/21/2024 0.038 (H)  0.000 - 0.026 ng/mL Final    BSA 03/22/2024 1.51  m2 Final    LA volume (mod) 03/22/2024 39.14  cm3 Final    Left Atrium Major Axis 03/22/2024 4.90  cm Final    Left Atrium Minor Axis 03/22/2024 5.08  cm Final    LV Diastolic Volume 03/22/2024 42.63  mL Final    LV Systolic Volume 03/22/2024 19.11  mL Final    PV  Peak D Torres 03/22/2024 0.32  m/s Final    PV Peak S Torres 03/22/2024 0.26  m/s Final    MV Peak A Torres 03/22/2024 1.05  m/s Final    MV Peak E Torres 03/22/2024 0.64  m/s Final    Ao VTI 03/22/2024 35.40  cm Final    Ao peak torres 03/22/2024 1.74  m/s Final    LVOT peak VTI 03/22/2024 32.30  cm Final    LVOT peak torres 03/22/2024 1.60  m/s Final    LVOT diameter 03/22/2024 1.99  cm Final    E wave deceleration time 03/22/2024 323.74  msec Final    AV mean gradient 03/22/2024 6  mmHg Final    RV S' 03/22/2024 13.35  cm/s Final    TAPSE 03/22/2024 2.42  cm Final    LA size 03/22/2024 2.98  cm Final    Ascending aorta 03/22/2024 3.10  cm Final    STJ 03/22/2024 2.21  cm Final    Sinus 03/22/2024 2.94  cm Final    LVIDs 03/22/2024 2.35  2.1 - 4.0 cm Final    Posterior Wall 03/22/2024 1.19 (A)  0.6 - 1.1 cm Final    IVS 03/22/2024 0.81  0.6 - 1.1 cm Final    LVIDd 03/22/2024 3.25 (A)  3.5 - 6.0 cm Final    PV PEAK VELOCITY 03/22/2024 0.82  m/s Final    TDI LATERAL 03/22/2024 0.08  m/s Final    Pulmonary Valve Mean Velocity 03/22/2024 0.66  m/s Final    Left Ventricular Outflow Tract Rebecca* 03/22/2024 6.63  mmHg Final    Left Ventricular Outflow Tract Rebecca* 03/22/2024 1.24  cm/s Final    IVC diameter 03/22/2024 0.64  cm Final    TDI SEPTAL 03/22/2024 0.06  m/s Final    LV LATERAL E/E' RATIO 03/22/2024 8.00  m/s Final    LV SEPTAL E/E' RATIO 03/22/2024 10.67  m/s Final    FS 03/22/2024 28  28 - 44 % Final    LV mass 03/22/2024 92.43  g Final    ZLVIDD 03/22/2024 -3.15   Final    ZLVIDS 03/22/2024 -1.28   Final    Left Ventricle Relative Wall Thick* 03/22/2024 0.73  cm Final    AV valve area 03/22/2024 2.84  cm² Final    AV Velocity Ratio 03/22/2024 0.92   Final    AV index (prosthetic) 03/22/2024 0.91   Final    PV peak gradient 03/22/2024 3  mmHg Final    E/A ratio 03/22/2024 0.61   Final    Mean e' 03/22/2024 0.07  m/s Final    Pulm vein S/D ratio 03/22/2024 0.81   Final    LVOT area 03/22/2024 3.1  cm2 Final    LVOT stroke volume  03/22/2024 100.41  cm3 Final    AV peak gradient 03/22/2024 12  mmHg Final    E/E' ratio 03/22/2024 9.14  m/s Final    LV Systolic Volume Index 03/22/2024 12.6  mL/m2 Final    LV Diastolic Volume Index 03/22/2024 28.05  mL/m2 Final    LV Mass Index 03/22/2024 61  g/m2 Final    LA Volume Index (Mod) 03/22/2024 25.8  mL/m2 Final    DAMASO by Velocity Ratio 03/22/2024 2.86  cm² Final    LA Volume Index 03/22/2024 26.6  mL/m2 Final    LA volume 03/22/2024 40.43  cm3 Final    LA WIDTH 03/22/2024 3.2  cm Final    Est. RA pres 03/22/2024 3  mmHg Final    Sed Rate 03/22/2024 43 (H)  0 - 36 mm/Hr Final    CRP 03/21/2024 8.2  0.0 - 8.2 mg/L Final    QRS Duration 03/21/2024 90  ms Final    OHS QTC Calculation 03/21/2024 427  ms Final    Troponin I 03/21/2024 0.012  0.000 - 0.026 ng/mL Final    Troponin I 03/22/2024 0.012  0.000 - 0.026 ng/mL Final    Sodium 03/22/2024 139  136 - 145 mmol/L Final    Potassium 03/22/2024 4.2  3.5 - 5.1 mmol/L Final    Chloride 03/22/2024 108  95 - 110 mmol/L Final    CO2 03/22/2024 21 (L)  23 - 29 mmol/L Final    Glucose 03/22/2024 85  70 - 110 mg/dL Final    BUN 03/22/2024 17  8 - 23 mg/dL Final    Creatinine 03/22/2024 1.2  0.5 - 1.4 mg/dL Final    Calcium 03/22/2024 9.7  8.7 - 10.5 mg/dL Final    Total Protein 03/22/2024 6.9  6.0 - 8.4 g/dL Final    Albumin 03/22/2024 3.2 (L)  3.5 - 5.2 g/dL Final    Total Bilirubin 03/22/2024 0.2  0.1 - 1.0 mg/dL Final    Alkaline Phosphatase 03/22/2024 92  55 - 135 U/L Final    AST 03/22/2024 24  10 - 40 U/L Final    ALT 03/22/2024 15  10 - 44 U/L Final    eGFR 03/22/2024 46 (A)  >60 mL/min/1.73 m^2 Final    Anion Gap 03/22/2024 10  8 - 16 mmol/L Final    Magnesium 03/22/2024 1.9  1.6 - 2.6 mg/dL Final    Phosphorus 03/22/2024 3.2  2.7 - 4.5 mg/dL Final    WBC 03/22/2024 5.76  3.90 - 12.70 K/uL Final    RBC 03/22/2024 4.66  4.00 - 5.40 M/uL Final    Hemoglobin 03/22/2024 11.1 (L)  12.0 - 16.0 g/dL Final    Hematocrit 03/22/2024 35.9 (L)  37.0 - 48.5 % Final     MCV 03/22/2024 77 (L)  82 - 98 fL Final    MCH 03/22/2024 23.8 (L)  27.0 - 31.0 pg Final    MCHC 03/22/2024 30.9 (L)  32.0 - 36.0 g/dL Final    RDW 03/22/2024 17.3 (H)  11.5 - 14.5 % Final    Platelets 03/22/2024 240  150 - 450 K/uL Final    MPV 03/22/2024 10.1  9.2 - 12.9 fL Final    Immature Granulocytes 03/22/2024 0.2  0.0 - 0.5 % Final    Gran # (ANC) 03/22/2024 2.6  1.8 - 7.7 K/uL Final    Immature Grans (Abs) 03/22/2024 0.01  0.00 - 0.04 K/uL Final    Lymph # 03/22/2024 1.5  1.0 - 4.8 K/uL Final    Mono # 03/22/2024 1.0  0.3 - 1.0 K/uL Final    Eos # 03/22/2024 0.6 (H)  0.0 - 0.5 K/uL Final    Baso # 03/22/2024 0.10  0.00 - 0.20 K/uL Final    nRBC 03/22/2024 0  0 /100 WBC Final    Gran % 03/22/2024 44.8  38.0 - 73.0 % Final    Lymph % 03/22/2024 26.2  18.0 - 48.0 % Final    Mono % 03/22/2024 16.5 (H)  4.0 - 15.0 % Final    Eosinophil % 03/22/2024 10.6 (H)  0.0 - 8.0 % Final    Basophil % 03/22/2024 1.7  0.0 - 1.9 % Final    Differential Method 03/22/2024 Automated   Final    Hemoglobin A1C 03/22/2024 5.6  4.0 - 5.6 % Final    Estimated Avg Glucose 03/22/2024 114  68 - 131 mg/dL Final    Troponin I 03/22/2024 0.006  0.000 - 0.026 ng/mL Final    Cholesterol 03/22/2024 141  120 - 199 mg/dL Final    Triglycerides 03/22/2024 65  30 - 150 mg/dL Final    HDL 03/22/2024 46  40 - 75 mg/dL Final    LDL Cholesterol 03/22/2024 82.0  63.0 - 159.0 mg/dL Final    HDL/Cholesterol Ratio 03/22/2024 32.6  20.0 - 50.0 % Final    Total Cholesterol/HDL Ratio 03/22/2024 3.1  2.0 - 5.0 Final    Non-HDL Cholesterol 03/22/2024 95  mg/dL Final    Troponin I 03/22/2024 <0.006  0.000 - 0.026 ng/mL Final    QRS Duration 03/22/2024 88  ms Final    OHS QTC Calculation 03/22/2024 434  ms Final   Clinical Support on 03/03/2024   Component Date Value Ref Range Status    Device Type 02/13/2024 Loop   Final    AF [place] % 02/13/2024 < 1   Final   Clinical Support on 02/01/2024   Component Date Value Ref Range Status    Device Type 01/14/2024 Loop    Final    AF [place] % 01/14/2024 < 1   Final   Clinical Support on 01/01/2024   Component Date Value Ref Range Status    Device Type 12/15/2023 Loop   Final    AF [place] % 12/15/2023 < 1   Final   Clinical Support on 12/01/2023   Component Date Value Ref Range Status    Device Type 12/01/2023 Loop   Final    AF [place] % 12/01/2023 < 1   Final   Clinical Support on 10/20/2023   Component Date Value Ref Range Status    Device Type 10/20/2023 Loop   Final    AF [place] % 10/20/2023 < 1   Final   (  X-Ray Chest 1 View  Status: Final result     MyChart Results Release    [employer] Status: Active  Results Release     PACS Images for [facility] [place] Viewer     Show images for X-Ray Chest 1 View  X-Ray Chest 1 View  Order: [id number]  Status: Final result     Visible to patient: Yes (not seen)     Next appt: None     Dx: Chest pain     0 Result Notes  Details    Reading Physician Reading Date Result Cliff Hawley DO  [phone]  [phone] 3/21/2024 STAT     Narrative & Impression  EXAMINATION:  XR CHEST 1 VIEW     CLINICAL HISTORY:  Chest pain, unspecified     TECHNIQUE:  Single frontal view of the chest was performed.     COMPARISON:  03/29/2022     FINDINGS:  Slight nonspecific elevation left lung base.  Continued coarse interstitial opacities throughout the lungs which are nonspecific and may represent fibrosis and scarring.  There is no new lung opacity.  No large lung consolidation.  No large pleural effusion or pneumothorax.  Continued atherosclerotic aorta.  Implanted presume cardiac device overlies the left thorax.  Continued surgical clips in the epigastric region.  Further evaluation as warranted clinically.     Impression:     Please see above        Electronically signed by: Cliff Lira DO  Date:                                            03/21/2024  Time:                                           13:26           Exam Ended: 03/21/24 13:24 CDT Last Resulted: 03/21/24 13:26 CDT       Order  Details     View Encounter     Lab and Collection Details     Routing     Result History              CT Abdomen Pelvis With IV Contrast NO Oral Contrast  Status: Final result     MyChart Results Release    [employer] Status: Active  Results Release     PACS Images for ViTAL [place] Viewer     Show images for CT Abdomen Pelvis With IV Contrast NO Oral Contrast  CT Abdomen Pelvis With IV Contrast NO Oral Contrast  Order: [id number]  Status: Final result     Visible to patient: Yes (not seen)     Next appt: None     0 Result Notes  Details    Reading Physician Reading Date Result Priority   Kaye Michel MD  [phone] 3/21/2024 STAT     Narrative & Impression  EXAMINATION:  CT ABDOMEN PELVIS WITH IV CONTRAST     CLINICAL HISTORY:  Abdominal pain, acute, nonlocalized;Right upper abd pain, vomiting, Hx of SBO;     TECHNIQUE:  Low dose axial images, sagittal and coronal reformations were obtained from the lung bases to the pubic symphysis following the IV administration of 75 mL of Omnipaque 350 .  Oral contrast was not given. Axial and coronal images reformatted.     COMPARISON:  11/25/2021 CT abdomen and pelvis with contrast     FINDINGS:  Minimal subpleural lines, unchanged from the prior study suggestive of minimal fibrosis.  No acute focal area of airspace consolidation, no pleural effusion.  No pericardial effusion.     There is mild prominence of the intrahepatic and extrahepatic biliary ducts, unchanged from the prior study, no obstructive process seen, this can be seen after cholecystectomy.  The common bile duct measures 12 mm.  The gallbladder is removed.     Several surgical clips at the gastroesophageal junction.  The stomach is nondistended.     The pancreatic duct is prominent measuring 5 mm, unchanged from the prior study, no pancreatic tissue atrophy, no pancreatic mass seen.  No peripancreatic inflammatory change.     The spleen and bilateral adrenal glands appear normal.     The right kidney  enhance normally, no hydronephrosis or definite stone identified.  The left kidney enhance normally there are few small hypoattenuating lesions, cannot be definitely characterized due to their small size, they are similar to the prior exam and are favored to represent cysts.  The bilateral ureters cannot be followed in their entirety within the pelvis due to paucity of fat, no definite obstructive process seen.  The bladder appears normal, no stone is seen within.  The uterus is removed.  The ovaries are not definitely identified.     Mild stool retention, no inflammatory changes of the bowel seen, no bowel dilation.  The appendix is not identified.  The small bowel is not dilated.  No inflammatory changes of the mesentery.  No free air, no free fluid.     The abdominal aorta tapers normally, there is moderate atherosclerotic plaque.  No para-aortic lymphadenopathy.     The abdominal wall is intact, the inguinal regions appear normal.     The osseous structures demonstrate no osseous lesions.  Grade 1 near 2 anterolisthesis of L4 relative to L5.     Impression:     No evidence of small-bowel obstruction.     Prominent intrahepatic and extrahepatic biliary ducts without definite obstructive process seen, the pancreatic duct is also slightly enlarged.  It is unchanged from the prior study of 11/25/2021 exam.  To correlate with liver enzymes, an MRCP could be done if clinically warranted.     Several hypoattenuating lesions of the left kidney, too small to characterize favored to represent cysts, not significantly changed from the prior study.     Cholecystectomy.     Hysterectomy.        Electronically signed by: Kaye Michel MD  Date:                                            03/21/2024  Time:                                           16:06           Exam Ended: 03/21/24 15:28 CDT         CTA Chest Non-Coronary (PE Studies)  Status: Final result     MyChart Results Release    [employer] Status: Active  Results  Release     PACS Images for [facility] Viewer     Show images for CTA Chest Non-Coronary (PE Studies)  CTA Chest Non-Coronary (PE Studies)  Order: [id number]  Status: Final result     Visible to patient: Yes (not seen)     Next appt: None     0 Result Notes  Details    Reading Physician Reading Date Result Priority   Louis Scott DO  [phone] 3/22/2024 Pending Discharge     Narrative & Impression  EXAMINATION:  CTA CHEST NON CORONARY (PE STUDIES)     CLINICAL HISTORY:  Pulmonary embolism (PE) suspected, high prob;     TECHNIQUE:  Low dose axial images, sagittal and coronal reformations were obtained from the thoracic inlet to the lung bases following the IV administration of 100 mL of Omnipaque 350.  Contrast timing was optimized to evaluate the pulmonary arteries.  Maximum intensity projection images were provided for review.     COMPARISON:  CT chest from 10/12/2017. CT of the abdomen and pelvis from 03/21/2024.     FINDINGS:  Pulmonary vasculature: Satisfactory opacification of the pulmonary arterial system with no filling defect to the segmental level.     Aorta: Left-sided aortic arch.  There is no aneurysm.  There is moderate atherosclerosis.     Base of Neck: No significant abnormality.     Thoracic soft tissues: Normal.     Heart: Normal size. No effusion.     Risa/Mediastinum: No pathologic te enlargement.     Airways: The large airways are patent. No foci of endobronchial filling.     Lungs/Pleura: There are moderate emphysematous changes.  There is mild subpleural reticulation which may related to senescent changes or early fibrotic changes.  There are bandlike opacities in the left lower lobe and in the lingula compatible with subsegmental atelectasis or scarring.  There is a 3 mm left upper lobe pulmonary nodule (series 3, image 131).  There is nonspecific elevation of the left hemidiaphragm.  No pleural effusion or thickening.     Esophagus: Normal.     Upper Abdomen: There is  continued intra and extrahepatic biliary ductal dilation stable from prior, may be due to cholecystectomy changes, correlate with recent CT abdomen pelvis.  There are multiple surgical clips.     Bones: No acute fracture. No suspicious lytic or sclerotic lesions.     Impression:     1. No pulmonary embolism to the segmental level.  2. Left upper lobe pulmonary nodule measuring 3 mm.  Per 2017 Fleischner criteria, recommend follow-up CT in 12 months if the patient is high risk.  3. Emphysematous changes.        Electronically signed by: Louis Scott  Date:                                            03/22/2024  Time:                                           17:05           Exam Ended: 03/22/24 16:40 CDT Last Resulted: 03/22/24 17:05 CDT               Reviewed By    Arjun Horn MD on 10/18/2019 13:19     IN OFFICE EKG 12-LEAD (to Muse)  Order: [id number]  Status: Final result    Visible to patient: Yes (seen)     Next appt: 12/01/2022 at 01:00 PM in Podiatry (Alfa Quintero DPM)     Dx: Essential hypertension; Nonspecific a...     0 Result Notes     Narrative  Performed by: GEMUSE  Test Reason : I10,I35.1,R94.31,I49.1,R06.09,I35.1,     Vent. Rate : 068 BPM     Atrial Rate : 068 BPM      P-R Int : 162 ms          QRS Dur : 080 ms       QT Int : 422 ms       P-R-T Axes : 073 063 088 degrees      QTc Int : 448 ms     Normal sinus rhythm   Anteroseptal infarct (cited on or before 27-MAR-2018)   Abnormal ECG   When compared with ECG of 02-JUL-2019 14:32,   Questionable change in initial forces of Septal leads   T wave amplitude has increased in Inferior leads   Nonspecific T wave abnormality, improved in Lateral leads   Confirmed by Arjun Horn MD ([id number]) on 10/18/2019 12:20:34 PM     Referred By:  tushar           Confirmed By:Arjun Horn MD      Specimen Collected: 10/17/19 14:43 Last Resulted: 10/18/19 12:20       Order Details     View Encounter     Lab and Collection Details     Routing    Result  "History    View Encounter Conversation          Result Care Coordination        Accession #: [id number]  Stress Echo  Order# [id number]  Reading physician: Arjun Horn MD Ordering physician: Arjun Horn MD Study date: 10/30/19     Reason for Exam  Priority: Routine  Dx: Nonspecific abnormal electrocardiogram (ECG) (EKG) [R94.31 (ICD-10-CM)]; Dyspnea on exertion [R06.09 (ICD-10-CM)]; Chest pain of uncertain etiology [R07.9 (ICD-10-CM)]     Result Image Hyperlink     Show images for Stress Echo Which stress agent will be used? Treadmill Exercise; Color Flow Doppler? Yes  Summary    Arrhythmias during stress: rare PACs,  The test was stopped because the patient experienced shortness of breath.  Normal left ventricular systolic function. The estimated ejection fraction is 70%  Grade I (mild) left ventricular diastolic dysfunction consistent with impaired relaxation.  Normal right ventricular systolic function.  Normal central venous pressure (3 mm Hg).  The stress echo portion of this study is negative for myocardial ischemia.  Moderate concentric left ventricular hypertrophy.  No wall motion abnormalities.  The patient's exercise capacity was moderately impaired.  The EKG portion of this study is negative for myocardial ischemia.       Accession #: [id number]  Transthoracic echo (TTE) complete with contrast    Height:  5' 3" (1.6 m)   Weight:  51.2 kg (112 lb 14 oz)   Blood Pressure:  149/79    Date of Study:  3/22/24   Ordering Provider:  Laura Poole MD   Clinical Indications:  SOB (shortness of breath) [R06.02 (ICD-10-CM)]       Interpreting Physicians  Performing Staff   Skinny Guzman MD Tech:  Krys Clemente        Reason for Exam  Priority: Routine  Dx: SOB (shortness of breath) [R06.02 (ICD-10-CM)]     View Images Vital Vitrea     Show images for Echo Saline Bubble? No  Summary         Left Ventricle: Normal wall thickness. There is concentric remodeling. There is " normal systolic function with a visually estimated ejection fraction of 55 - 60%. Grade I diastolic dysfunction.    Right Ventricle: Right ventricle was not well visualized due to poor acoustic window. Systolic function is normal. TAPSE is 2.42 cm.    Normal left atrial size. The left atrium volume index is 26.6 mL/m2.    RA: Not well visualized due to poor acoustic window.    The aortic valve is structurally normal. There is normal leaflet mobility.    The mitral valve is structurally normal. There is normal leaflet mobility.    The tricuspid valve is structurally normal. There is normal leaflet mobility.    IVC/SVC: Normal venous pressure at 3 mmHg.     Vitals          Old chart reviewed.  Pt saw ID MD, Dr Mccormack for fungemia felt to be from a central line last year after pt's [place] hospitalization for lysis of adhesions and cholecystectomy and likely removal of the mesh.    Pt has a hx of frequent UTI's and has recently taken antibiotics      Assessment:     1. SOB (shortness of breath)    2. Pulmonary emphysema, unspecified emphysema type    3. Aortic atherosclerosis    4. Syncope due to orthostatic hypotension      Plan:   Autumn was seen today for shortness of breath.    Diagnoses and all orders for this visit:    SOB (shortness of breath)    Pulmonary emphysema, unspecified emphysema type    Aortic atherosclerosis    Syncope due to orthostatic hypotension     The shortness of breath is likely due to pulmonary emphysema and possible aspiration pneumonitis.    It is unclear what is causing pt's weakness.  Pt may be suffering from depression.  Will therefore discontinue the montelukast.  Pt counseled to f/u with Dr Hernandez.  Pt may benefit from physical therapy    The wt loss is most likely due to difficulty swallowing    The syncope in the past was felt to be due to orthostatic hypotension.  Pt was once felt to have hypoglycemia    Pt has lost 35 lbs over the past couple of years.    Pt reports that she  frequently has trouble swallowing.    Dr Harden apparently has done a barium swallow to look for aspiration    Pt last saw her gastroenterologist, Dr Sunshine,  several months ago.  Pt counseled to see Dr Sunshine again to consider another EGD.  Pt has had a good response to esophageal dilatation in the past.  Pt may benefit from speech therapy and a swallowing study    Pt has an appt with Dr Vega tomorrow    Will repeat ECG today due to continuous chest discomfort which may be due to GERD---NSR with PVC's, nonspecific T wave abnormality, no acute change    Follow up in about 4 weeks (around 4/25/2024).

## 2024-04-03 ENCOUNTER — CLINICAL SUPPORT (OUTPATIENT)
Dept: CARDIOLOGY | Facility: HOSPITAL | Age: 79
End: 2024-04-03
Attending: INTERNAL MEDICINE
Payer: MEDICARE

## 2024-04-03 DIAGNOSIS — Z95.818 PRESENCE OF OTHER CARDIAC IMPLANTS AND GRAFTS: ICD-10-CM

## 2024-04-03 PROCEDURE — 93298 REM INTERROG DEV EVAL SCRMS: CPT | Mod: 26,,, | Performed by: INTERNAL MEDICINE

## 2024-04-19 LAB
OHS CV AF BURDEN PERCENT: < 1
OHS CV DC REMOTE DEVICE TYPE: NORMAL

## 2024-05-04 ENCOUNTER — CLINICAL SUPPORT (OUTPATIENT)
Dept: CARDIOLOGY | Facility: HOSPITAL | Age: 79
End: 2024-05-04
Attending: INTERNAL MEDICINE
Payer: MEDICARE

## 2024-05-04 DIAGNOSIS — Z95.818 PRESENCE OF OTHER CARDIAC IMPLANTS AND GRAFTS: ICD-10-CM

## 2024-05-04 PROCEDURE — 93298 REM INTERROG DEV EVAL SCRMS: CPT | Mod: 26,,, | Performed by: INTERNAL MEDICINE

## 2024-05-04 PROCEDURE — 93298 REM INTERROG DEV EVAL SCRMS: CPT | Performed by: INTERNAL MEDICINE

## 2024-05-13 LAB
OHS CV AF BURDEN PERCENT: < 1
OHS CV DC REMOTE DEVICE TYPE: NORMAL

## 2024-06-04 ENCOUNTER — CLINICAL SUPPORT (OUTPATIENT)
Dept: CARDIOLOGY | Facility: HOSPITAL | Age: 79
End: 2024-06-04
Attending: INTERNAL MEDICINE
Payer: MEDICARE

## 2024-06-04 DIAGNOSIS — Z95.818 PRESENCE OF OTHER CARDIAC IMPLANTS AND GRAFTS: ICD-10-CM

## 2024-06-04 PROCEDURE — 93298 REM INTERROG DEV EVAL SCRMS: CPT | Mod: 26,,, | Performed by: INTERNAL MEDICINE

## 2024-06-04 PROCEDURE — 93298 REM INTERROG DEV EVAL SCRMS: CPT | Performed by: INTERNAL MEDICINE

## 2024-06-14 LAB
OHS CV AF BURDEN PERCENT: < 1
OHS CV DC REMOTE DEVICE TYPE: NORMAL

## 2024-06-26 ENCOUNTER — OFFICE VISIT (OUTPATIENT)
Dept: URGENT CARE | Facility: CLINIC | Age: 79
End: 2024-06-26
Payer: MEDICARE

## 2024-06-26 VITALS
DIASTOLIC BLOOD PRESSURE: 71 MMHG | OXYGEN SATURATION: 97 % | RESPIRATION RATE: 20 BRPM | TEMPERATURE: 98 F | SYSTOLIC BLOOD PRESSURE: 109 MMHG | BODY MASS INDEX: 19.49 KG/M2 | HEART RATE: 72 BPM | HEIGHT: 63 IN | WEIGHT: 110 LBS
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
## 2024-06-26 DIAGNOSIS — R30.0 DYSURIA: ICD-10-CM

## 2024-06-26 DIAGNOSIS — M54.2 NECK PAIN: Primary | ICD-10-CM

## 2024-06-26 PROCEDURE — 72040 X-RAY EXAM NECK SPINE 2-3 VW: CPT | Mod: FY,S$GLB,, | Performed by: RADIOLOGY

## 2024-06-26 RX ORDER — LIDOCAINE 50 MG/G
1 PATCH TOPICAL DAILY
Qty: 14 PATCH | Refills: 0 | Status: SHIPPED | OUTPATIENT
Start: 2024-06-26 | End: 2024-07-10

## 2024-06-26 RX ORDER — DICLOFENAC SODIUM 10 MG/G
2 GEL TOPICAL DAILY
Qty: 20 G | Refills: 0 | Status: SHIPPED | OUTPATIENT
Start: 2024-06-26 | End: 2024-07-03

## 2024-06-26 NOTE — PROGRESS NOTES
"Subjective:      Patient ID: Autumn Harris is a 78 y.o. female.    Vitals:  height is 5' 3" (1.6 m) and weight is 49.9 kg (110 lb). Her oral temperature is 98.1 °F (36.7 °C). Her blood pressure is 109/71 and her pulse is 72. Her respiration is 20 and oxygen saturation is 97%.     Chief Complaint: Fall    Pt present with trauma injury from a fall that she has have several time hurting her the back of her head( has a bump on her head) , Neck Bruised her Hip, Buttocks and Back and a large scrap on her right forearm and left wrist area. Pt states her balance is completely off and she has been falling frequently.    Provider note    77 yo F c/o neck pain after falling at home a few days ago. Daughter accompanies her today. Reports she is off balance and falling a lot lately, several times a day. Denies loss of consciousness, nausea, vomiting, CP, palpitations. States she is sob with exertion however this is not new. She ambulates with a cane. Pt [place] and daughter reports pt has regressed since this hospitalization. Pt states she did start a new medication for depression however she is unsure of the name and dose and it is not listed in Epic. She also c/o chronic UTIs. She has intermittent burning, urgency, and frequency. Denies fever, chills. States she was treated for a UTI 3 weeks ago but does not feel 100% relieved. She is not taking anything at home for these current problems. She is followed by cardiology as well and was hospitalized for weakness in march of this year. She has appts with PCP and cardiology next month. She is not diabetic and is not on blood. thinners    Fall  The accident occurred 3 to 5 days ago. The fall occurred in unknown circumstances. She fell from an unknown height. She landed on Carpet. There was no blood loss. The point of impact was the buttocks, neck and head (Left and Right forearms). The pain is present in the head, neck, right elbow, left " lower arm and right lower arm. The pain is at a severity of 8/10 (Head and Neck Pain). The pain is severe. The symptoms are aggravated by standing, sitting and movement. Associated symptoms include headaches. Pertinent negatives include no fever, loss of consciousness, nausea, numbness or vomiting. She has tried acetaminophen (Aspirin) for the symptoms. The treatment provided no relief.       Constitution: Negative for chills, sweating, fatigue, fever, unexpected weight change and generalized weakness.   HENT:  Negative for ear pain, facial trauma and congestion.    Neck: Positive for neck pain. Negative for neck stiffness, neck swelling and bulging disc disease.   Cardiovascular:  Negative for chest pain, leg swelling, palpitations, sob on exertion and passing out.   Respiratory:  Negative for cough.    Gastrointestinal:  Negative for nausea and vomiting.   Neurological:  Positive for headaches. Negative for history of vertigo, light-headedness, passing out, disorientation, altered mental status, loss of consciousness, numbness and tingling.   Psychiatric/Behavioral:  Negative for altered mental status, disorientation, confusion and agitation.       Objective:     Physical Exam   Constitutional: She is oriented to person, place, and time. normal  HENT:   Head: Normocephalic and atraumatic.   Nose: No rhinorrhea or congestion.   Eyes: Conjunctivae are normal. Pupils are equal, round, and reactive to light. Extraocular movement intact   Neck: No neck rigidity present.   Cardiovascular: Normal rate, regular rhythm and normal heart sounds.   Pulmonary/Chest: Effort normal and breath sounds normal.   Abdominal: Normal appearance. There is no left CVA tenderness and no right CVA tenderness.   Musculoskeletal: Normal range of motion.         General: Normal range of motion.      Cervical back: She exhibits no tenderness.   Neurological: She is alert and oriented to person, place, and time.   Skin: Skin is warm and dry.        Assessment:     1. Neck pain    2. Fall, initial encounter    3. Dysuria        Plan:   Pt unable to provide sample for UA in clinic. Pt will drop off if able to collect at home. Advised f/u with pcp and cardiology. Discussed UTI as possible contributor of symptoms however pts falls were occurring before the recent UTI. However strongly advised close f/u and UA to rule out. Pt and daughter verbalized understanding.     Neck pain  -     X-Ray Cervical Spine AP And Lateral; Future; Expected date: 06/26/2024  -     diclofenac sodium (VOLTAREN) 1 % Gel; Apply 2 g topically once daily. for 7 days  Dispense: 20 g; Refill: 0  -     LIDOcaine (LIDODERM) 5 %; Place 1 patch onto the skin once daily. Remove & Discard patch within 12 hours or as directed by MD for 14 days  Dispense: 14 patch; Refill: 0    Fall, initial encounter  -     X-Ray Cervical Spine AP And Lateral; Future; Expected date: 06/26/2024    Dysuria  -     POCT Urinalysis(Instrument)          Medical Decision Making:   Differential Diagnosis:   Differentials for causes of frequent falls include UTI, hypotension/cardiac etiology, polypharmacy     Please return here or go to the Emergency Department for any concerns or worsening of condition.  Please follow up with your primary care doctor or specialist as needed.        X-Ray Cervical Spine AP And Lateral    Result Date: 6/26/2024  EXAMINATION: XR CERVICAL SPINE AP LATERAL CLINICAL HISTORY: Cervicalgia TECHNIQUE: AP, lateral and open mouth views of the cervical spine were performed. COMPARISON: None. FINDINGS: There is osteopenia.  The cervical spine is visualized from the craniocervical junction to the inferior endplate of C7 on the lateral view. There is no acute fracture or subluxation of the cervical spine.  The vertebral body heights are preserved. Alignment is normal. The disc heights are preserved. There are multilevel degenerative changes of the cervical spine.  The remaining visualized osseous  structures are intact. Prevertebral soft tissues are within normal limits.  There is maxillary and mandibular hardware.     No acute fracture or dislocation of the cervical spine. Electronically signed by: Louis Scott Date:    06/26/2024 Time:    16:55

## 2024-06-27 LAB
BILIRUBIN, UA POC OHS: NEGATIVE
BLOOD, UA POC OHS: NEGATIVE
CLARITY, UA POC OHS: CLEAR
COLOR, UA POC OHS: YELLOW
GLUCOSE, UA POC OHS: NEGATIVE
KETONES, UA POC OHS: NEGATIVE
LEUKOCYTES, UA POC OHS: ABNORMAL
NITRITE, UA POC OHS: POSITIVE
PH, UA POC OHS: 5.5
PROTEIN, UA POC OHS: NEGATIVE
SPECIFIC GRAVITY, UA POC OHS: 1.01
UROBILINOGEN, UA POC OHS: 0.2

## 2024-07-05 ENCOUNTER — CLINICAL SUPPORT (OUTPATIENT)
Dept: CARDIOLOGY | Facility: HOSPITAL | Age: 79
End: 2024-07-05
Attending: INTERNAL MEDICINE
Payer: MEDICARE

## 2024-07-05 DIAGNOSIS — I48.0 PAROXYSMAL ATRIAL FIBRILLATION: ICD-10-CM

## 2024-07-05 PROCEDURE — 93298 REM INTERROG DEV EVAL SCRMS: CPT | Performed by: INTERNAL MEDICINE

## 2024-07-05 PROCEDURE — 93298 REM INTERROG DEV EVAL SCRMS: CPT | Mod: 26,,, | Performed by: INTERNAL MEDICINE

## 2024-07-12 ENCOUNTER — HOSPITAL ENCOUNTER (EMERGENCY)
Facility: HOSPITAL | Age: 79
Discharge: HOME | End: 2024-07-12
Attending: EMERGENCY MEDICINE
Payer: MEDICARE

## 2024-07-12 VITALS
TEMPERATURE: 98 F | BODY MASS INDEX: 18.14 KG/M2 | WEIGHT: 102.38 LBS | HEART RATE: 68 BPM | SYSTOLIC BLOOD PRESSURE: 120 MMHG | RESPIRATION RATE: 18 BRPM | OXYGEN SATURATION: 96 % | DIASTOLIC BLOOD PRESSURE: 67 MMHG

## 2024-07-12 DIAGNOSIS — R53.1 WEAKNESS: ICD-10-CM

## 2024-07-12 DIAGNOSIS — K52.9 COLITIS: Primary | ICD-10-CM

## 2024-07-12 DIAGNOSIS — R19.7 DIARRHEA, UNSPECIFIED TYPE: ICD-10-CM

## 2024-07-12 LAB
ALBUMIN SERPL BCP-MCNC: 3.7 G/DL (ref 3.5–5.2)
ALP SERPL-CCNC: 92 U/L (ref 55–135)
ALT SERPL W/O P-5'-P-CCNC: 24 U/L (ref 10–44)
ANION GAP SERPL CALC-SCNC: 10 MMOL/L (ref 8–16)
AST SERPL-CCNC: 24 U/L (ref 10–40)
BASOPHILS # BLD AUTO: 0.09 K/UL (ref 0–0.2)
BASOPHILS NFR BLD: 0.9 % (ref 0–1.9)
BILIRUB SERPL-MCNC: 0.4 MG/DL (ref 0.1–1)
BUN SERPL-MCNC: 12 MG/DL (ref 8–23)
CALCIUM SERPL-MCNC: 10.7 MG/DL (ref 8.7–10.5)
CHLORIDE SERPL-SCNC: 111 MMOL/L (ref 95–110)
CO2 SERPL-SCNC: 19 MMOL/L (ref 23–29)
CREAT SERPL-MCNC: 1.1 MG/DL (ref 0.5–1.4)
DIFFERENTIAL METHOD BLD: ABNORMAL
EOSINOPHIL # BLD AUTO: 0.2 K/UL (ref 0–0.5)
EOSINOPHIL NFR BLD: 2.1 % (ref 0–8)
ERYTHROCYTE [DISTWIDTH] IN BLOOD BY AUTOMATED COUNT: 18.2 % (ref 11.5–14.5)
EST. GFR  (NO RACE VARIABLE): 51 ML/MIN/1.73 M^2
GLUCOSE SERPL-MCNC: 83 MG/DL (ref 70–110)
HCT VFR BLD AUTO: 39.2 % (ref 37–48.5)
HGB BLD-MCNC: 12.5 G/DL (ref 12–16)
IMM GRANULOCYTES # BLD AUTO: 0.02 K/UL (ref 0–0.04)
IMM GRANULOCYTES NFR BLD AUTO: 0.2 % (ref 0–0.5)
LIPASE SERPL-CCNC: 39 U/L (ref 4–60)
LYMPHOCYTES # BLD AUTO: 2.6 K/UL (ref 1–4.8)
LYMPHOCYTES NFR BLD: 26.3 % (ref 18–48)
MCH RBC QN AUTO: 26.9 PG (ref 27–31)
MCHC RBC AUTO-ENTMCNC: 31.9 G/DL (ref 32–36)
MCV RBC AUTO: 85 FL (ref 82–98)
MONOCYTES # BLD AUTO: 1 K/UL (ref 0.3–1)
MONOCYTES NFR BLD: 9.8 % (ref 4–15)
NEUTROPHILS # BLD AUTO: 6 K/UL (ref 1.8–7.7)
NEUTROPHILS NFR BLD: 60.7 % (ref 38–73)
NRBC BLD-RTO: 0 /100 WBC
PLATELET # BLD AUTO: 211 K/UL (ref 150–450)
PMV BLD AUTO: 11.1 FL (ref 9.2–12.9)
POCT GLUCOSE: 81 MG/DL (ref 70–110)
POTASSIUM SERPL-SCNC: 4.1 MMOL/L (ref 3.5–5.1)
PROT SERPL-MCNC: 6.9 G/DL (ref 6–8.4)
RBC # BLD AUTO: 4.64 M/UL (ref 4–5.4)
SODIUM SERPL-SCNC: 140 MMOL/L (ref 136–145)
TROPONIN I SERPL DL<=0.01 NG/ML-MCNC: <0.006 NG/ML (ref 0–0.03)
TSH SERPL DL<=0.005 MIU/L-ACNC: 1.07 UIU/ML (ref 0.4–4)
WBC # BLD AUTO: 9.88 K/UL (ref 3.9–12.7)

## 2024-07-12 PROCEDURE — 82962 GLUCOSE BLOOD TEST: CPT

## 2024-07-12 PROCEDURE — 25000003 PHARM REV CODE 250: Performed by: EMERGENCY MEDICINE

## 2024-07-12 PROCEDURE — 84484 ASSAY OF TROPONIN QUANT: CPT

## 2024-07-12 PROCEDURE — 96361 HYDRATE IV INFUSION ADD-ON: CPT

## 2024-07-12 PROCEDURE — 25500020 PHARM REV CODE 255: Performed by: EMERGENCY MEDICINE

## 2024-07-12 PROCEDURE — 93010 ELECTROCARDIOGRAM REPORT: CPT | Mod: ,,, | Performed by: INTERNAL MEDICINE

## 2024-07-12 PROCEDURE — 96374 THER/PROPH/DIAG INJ IV PUSH: CPT | Mod: 59

## 2024-07-12 PROCEDURE — 85025 COMPLETE CBC W/AUTO DIFF WBC: CPT

## 2024-07-12 PROCEDURE — 83690 ASSAY OF LIPASE: CPT

## 2024-07-12 PROCEDURE — 99285 EMERGENCY DEPT VISIT HI MDM: CPT | Mod: 25

## 2024-07-12 PROCEDURE — 84443 ASSAY THYROID STIM HORMONE: CPT

## 2024-07-12 PROCEDURE — 80053 COMPREHEN METABOLIC PANEL: CPT

## 2024-07-12 PROCEDURE — 93005 ELECTROCARDIOGRAM TRACING: CPT

## 2024-07-12 PROCEDURE — 63600175 PHARM REV CODE 636 W HCPCS

## 2024-07-12 RX ORDER — AMOXICILLIN AND CLAVULANATE POTASSIUM 875; 125 MG/1; MG/1
1 TABLET, FILM COATED ORAL 2 TIMES DAILY
Qty: 10 TABLET | Refills: 0 | Status: SHIPPED | OUTPATIENT
Start: 2024-07-12 | End: 2024-07-17

## 2024-07-12 RX ORDER — ONDANSETRON 4 MG/1
4 TABLET, ORALLY DISINTEGRATING ORAL EVERY 6 HOURS PRN
Qty: 30 TABLET | Refills: 0 | Status: SHIPPED | OUTPATIENT
Start: 2024-07-12

## 2024-07-12 RX ORDER — ONDANSETRON 4 MG/1
4 TABLET, FILM COATED ORAL EVERY 6 HOURS PRN
Qty: 12 TABLET | Refills: 0 | Status: SHIPPED | OUTPATIENT
Start: 2024-07-12

## 2024-07-12 RX ORDER — AMOXICILLIN AND CLAVULANATE POTASSIUM 875; 125 MG/1; MG/1
1 TABLET, FILM COATED ORAL
Status: COMPLETED | OUTPATIENT
Start: 2024-07-12 | End: 2024-07-12

## 2024-07-12 RX ORDER — ONDANSETRON HYDROCHLORIDE 2 MG/ML
4 INJECTION, SOLUTION INTRAVENOUS
Status: COMPLETED | OUTPATIENT
Start: 2024-07-12 | End: 2024-07-12

## 2024-07-12 RX ADMIN — ONDANSETRON 4 MG: 2 INJECTION INTRAMUSCULAR; INTRAVENOUS at 08:07

## 2024-07-12 RX ADMIN — SODIUM CHLORIDE, POTASSIUM CHLORIDE, SODIUM LACTATE AND CALCIUM CHLORIDE 500 ML: 600; 310; 30; 20 INJECTION, SOLUTION INTRAVENOUS at 07:07

## 2024-07-12 RX ADMIN — IOHEXOL 75 ML: 350 INJECTION, SOLUTION INTRAVENOUS at 07:07

## 2024-07-12 RX ADMIN — AMOXICILLIN AND CLAVULANATE POTASSIUM 1 TABLET: 875; 125 TABLET, FILM COATED ORAL at 10:07

## 2024-07-12 NOTE — ED PROVIDER NOTES
Encounter Date: 7/12/2024       History     Chief Complaint   Patient presents with    Fatigue     Pt arrives with complaints of weakness, dizziness, diarrhea. States symptoms have been present for 1 month but progressively gotten worse.      Autumn Harris is a 79 y.o. female with PMH of CKD, COPD, hypertension, presenting to ED for fatigue.  Patient states that approximately a month ago she received abdominal surgery for small-bowel obstruction and burning hand [place].  Reports that she has had a 40 lb weight loss since then.  States that she has been having generalized weakness and daily diarrhea.  Reports intermittent dysuria.  Denies any fever, cough, congestion, rhinorrhea.  Denies any chest pain or shortness of breath.  Reports right lower quadrant abdominal pain that is intermittent but has been ongoing for greater than 2 weeks.      Review of patient's allergies indicates:   Allergen Reactions    Pcn [penicillins]     Sulfa (sulfonamide antibiotics)      Past Medical History:   Diagnosis Date    Anxiety     CKD (chronic kidney disease) stage 3, GFR 30-59 ml/min     COPD (chronic obstructive pulmonary disease)     Depression     Encounter for blood transfusion     Fibromyalgia     Hematuria     High cholesterol     Hypertension     Oral cancer     Proteinuria     Sore throat 7/3/2019    Urinary tract infection     Vitamin D deficiency      Past Surgical History:   Procedure Laterality Date    ABDOMINAL SURGERY      [place]    HYSTERECTOMY      INSERTION OF IMPLANTABLE LOOP RECORDER N/A 10/15/2021    Procedure: Insertion, Implantable Loop Recorder;  Surgeon: Cristhian Alvares MD;  Location: [facility] CATH LAB/EP;  Service: Cardiology;  Laterality: N/A;    IRRIGATION AND DEBRIDEMENT OF LOWER EXTREMITY Right 11/9/2022    Procedure: IRRIGATION AND DEBRIDEMENT, LOWER EXTREMITY, SECOND TOE;  Surgeon: Alfa Quintero DPM;  Location: [facility] OR;  Service: Podiatry;  Laterality: Right;  micro sagittal saw, vancomycin  powder    OOPHORECTOMY      only one removed    Stomach Ulcer Surgery       Family History   Adopted: Yes   Problem Relation Name Age of Onset    Heart disease Mother      Pacemaker/defibrilator Brother      Heart disease Brother      Lung cancer Brother      Heart disease Maternal Aunt       Social History     Tobacco Use    Smoking status: Former     Current packs/day: 0.00     Average packs/day: 1.5 packs/day for 45.0 years (67.5 ttl pk-yrs)     Types: Cigarettes     Start date: 1950     Quit date: 1995     Years since quittin.9    Smokeless tobacco: Never    Tobacco comments:     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   Substance Use Topics    Alcohol use: No    Drug use: Never     Review of Systems   Constitutional:  Negative for fever.   HENT:  Negative for congestion, rhinorrhea and sore throat.    Eyes:  Negative for visual disturbance.   Respiratory:  Negative for cough and shortness of breath.    Cardiovascular:  Negative for chest pain and leg swelling.   Gastrointestinal:  Positive for abdominal pain, diarrhea and nausea. Negative for vomiting.   Genitourinary:  Negative for dysuria and hematuria.   Neurological:  Positive for weakness. Negative for numbness.       Physical Exam     Initial Vitals [24 1720]   BP Pulse Resp Temp SpO2   102/70 101 18 98.4 °F (36.9 °C) 97 %      MAP       --         Physical Exam    Nursing note and vitals reviewed.  Constitutional: She appears cachectic. She is cooperative.  Non-toxic appearance. She appears ill (Chronically).   HENT:   Head: Normocephalic and atraumatic.   Mouth/Throat: Mucous membranes are normal. Mucous membranes are not dry.   Eyes: Conjunctivae are normal. Pupils are equal, round, and reactive to light.   Neck: Trachea normal and phonation normal.   Cardiovascular:  Normal rate, regular rhythm, normal heart sounds, intact distal pulses and normal pulses.     Exam reveals no gallop, no S3, no S4 and no friction rub.        No murmur heard.  Pulmonary/Chest: Breath sounds normal. No respiratory distress. She has no wheezes. She has no rhonchi. She has no rales.   Abdominal: Abdomen is soft. She exhibits no distension. There is abdominal tenderness in the right lower quadrant.   Musculoskeletal:      Right lower leg: No edema.      Left lower leg: No edema.     Neurological: She is alert.   Skin: Skin is warm, dry and intact. Capillary refill takes less than 2 seconds.   Psychiatric: She has a normal mood and affect. Her speech is normal.         ED Course   Procedures  Labs Reviewed   CBC W/ AUTO DIFFERENTIAL - Abnormal; Notable for the following components:       Result Value    MCH 26.9 (*)     MCHC 31.9 (*)     RDW 18.2 (*)     All other components within normal limits   COMPREHENSIVE METABOLIC PANEL - Abnormal; Notable for the following components:    Chloride 111 (*)     CO2 19 (*)     Calcium 10.7 (*)     eGFR 51 (*)     All other components within normal limits   CLOSTRIDIUM DIFFICILE   TSH   TROPONIN I   LIPASE   URINALYSIS, REFLEX TO URINE CULTURE   POCT GLUCOSE     EKG Readings: (Independently Interpreted)   Initial Reading: No STEMI. Previous EKG: Compared with most recent EKG Rhythm: Normal Sinus Rhythm. Heart Rate: 78. Conduction: Normal. ST Segments: Normal ST Segments. Clinical Impression: Normal Sinus Rhythm       Imaging Results              CT Abdomen Pelvis With IV Contrast NO Oral Contrast (In process)                      CT Head Without Contrast (In process)                      CT Cervical Spine Without Contrast (In process)                      X-Ray Chest AP Portable (Final result)  Result time 07/12/24 19:18:23      Final result by Louis Scott DO (07/12/24 19:18:23)                   Impression:      No acute abnormality.      Electronically signed by: Louis Scott  Date:    07/12/2024  Time:    19:18               Narrative:    EXAMINATION:  XR CHEST AP PORTABLE    CLINICAL  HISTORY:  Weakness    TECHNIQUE:  Single frontal view of the chest was performed.    COMPARISON:  03/21/2024.    FINDINGS:  There is a loop recorder device.  There are surgical clips.  The lungs are hyperexpanded.  There are coarse interstitial opacity, stable.  The pleural spaces are clear.  There is nonspecific elevation of the left hemidiaphragm.  The cardiac silhouette is unremarkable.  There are calcifications of the aortic arch.  Osseous structures demonstrate degenerative changes.                                       Medications   lactated ringers bolus 500 mL (500 mLs Intravenous New Bag 7/12/24 1922)   ondansetron injection 4 mg (has no administration in time range)   iohexoL (OMNIPAQUE 350) injection 75 mL (75 mLs Intravenous Given 7/12/24 1948)     Medical Decision Making  79-year-old female with recent hospitalization for bowel obstruction presenting for generalized weakness and diarrhea.  Initially, patient is afebrile, hemodynamically stable and grossly well-appearing.      Will assess for evidence of intra-abdominal pathology since patient has right lower quadrant abdominal pain and chronic diarrhea.  Since she has had daily diarrhea, will obtain CMP to assess for electrolyte disturbances.  States that she does have dark-colored stool but takes iron, will obtain CBC to look for anemia.  Reports dysuria, will evaluate for UTI.  Since patient reports recent falls due to weakness and hitting her head, per [place] head CT rule, will obtain advanced imaging of her head and neck.  Will give 500 cc LR for fluid resuscitation.    Workup so far does not reveal a leukocytosis or left shift.  EKG, troponin x1 reassuring.  Troponin x2 unnecessary since patient has not had any chest pain.  Low suspicion for pancreatitis.  Patient does not have an anemia so low suspicion for GI bleed.  No significant electrolyte abnormalities.  No evidence of thyroid disease.    Discussed with oncoming team, patient pending CT  head, CT cervical spine, CT abdomen.    Amount and/or Complexity of Data Reviewed  Labs: ordered. Decision-making details documented in ED Course.  Radiology: ordered. Decision-making details documented in ED Course.    Risk  Prescription drug management.               ED Course as of 07/12/24 1955 Fri Jul 12, 2024 1927 Hemoglobin: 12.5 [ES]   1927 WBC: 9.88 [ES]   1927 Gran # (ANC): 6.0 [ES]   1944 Troponin I: <0.006 [ES]   1944 Lipase: 39 [ES]   1951 TSH: 1.072 [ES]   1951 CO2(!): 19 [ES]   1951 Comprehensive metabolic panel(!)  No significant electrolyte disturbances [ES]   1955 X-Ray Chest AP Portable  Independent interpretation: No evidence of pneumonia, pulmonary edema [ES]      ED Course User Index  [ES] Jaleesa Weller MD                             Clinical Impression:  Final diagnoses:  [R53.1] Weakness  [R19.7] Diarrhea, unspecified type (Primary)                 Jaleesa Weller MD  Resident  07/12/24 1952       Jaleesa Weller MD  Resident  07/12/24 1955

## 2024-07-12 NOTE — ED NOTES
Patient identifiers verified and correct.      LOC: The patient is awake, alert and aware of environment with an appropriate affect, the patient is oriented x 4 and speaking appropriately.      APPEARANCE: Patient appears comfortable and in no acute distress, patient is clean and well groomed.     HEENT: Head symmetrical. Eyes bilateral.  Bilateral ears without drainage. Bilateral nares patent, throat clear.     SKIN: The skin is warm and dry, color consistent with ethnicity, patient has normal skin turgor and moist mucus membranes, skin intact, no breakdown or bruising noted.      MUSCULOSKELETAL: Patient moving all extremities spontaneously, no swelling noted.    Genitourinary: Patient denies any dysuria, bleeding, and discharge. Patient denies any reports of incontinence.      RESPIRATORY: Airway is open and patent, respirations are spontaneous, patient has a normal effort and rate, no accessory muscle use noted.      CARDIAC: Patient has a normal rate, no edema noted, capillary refill < 3 seconds.      GASTRO: Abdomen soft and non-distended. Reports diarrhea.      NEURO: Pt opens eyes spontaneously pupils equal, round, and reactive. behavior appropriate to situation, follows commands, facial expression symmetrical,   bilateral hand grasp equal and even, purposeful motor response noted, normal sensation in all extremities when touched with a finger.     NEUROVASCULAR: All extremities are warm and pink.

## 2024-07-13 NOTE — PROVIDER PROGRESS NOTES - EMERGENCY DEPT.
Encounter Date: 7/12/2024    ED Physician Progress Notes        Received sign-out from previous team plan was follow-up blood work CT scan reassess patient.  Patient resting comfortably in bed no acute distress.  Has not had any episodes of significant diarrhea while in the emergency department.  Labs obtained and reviewed, no leukocytosis no left shift lipase TSH troponin within limits SI electrolyte abnormality.  CT head and neck obtained reviewed no significant intracranial process noted incidental findings of pulmonary nodule and enlarged thyroid noted discussed with family they will follow up.  CT abdomen pelvis obtained and reviewed colitis noted.  No significant findings noted on CT scan low suspicion for C diff, patient does not leukocytosis fever significant abdominal pain, has not diarrhea while in the ED. very well could be the source of patient's diarrhea.  Patient has a penicillin allergy noted in the chart, however when I discussed about it, she reports that she was taking penicillin for years, then in 2016 she received a vaccine, sounds like she had localized vaccine reaction, she was given a penicillin antibiotic her swelling got worse and that was discontinued and she was advised that she may have allergy to penicillin.  I believe it is likely more related to worsening vaccine reaction.  I discussed patient length will try a dose of Augmentin here and assess for any adverse reactions if okay will plan discharge with Augmentin, will give Zofran to help nausea.  Strict return precautions discussed patient to be discharged

## 2024-07-13 NOTE — DISCHARGE INSTRUCTIONS
Diagnosis: Abdominal pain    Tests you had showed:   Labs Reviewed   CBC W/ AUTO DIFFERENTIAL - Abnormal; Notable for the following components:       Result Value    MCH 26.9 (*)     MCHC 31.9 (*)     RDW 18.2 (*)     All other components within normal limits   COMPREHENSIVE METABOLIC PANEL - Abnormal; Notable for the following components:    Chloride 111 (*)     CO2 19 (*)     Calcium 10.7 (*)     eGFR 51 (*)     All other components within normal limits   CLOSTRIDIUM DIFFICILE   TSH   TROPONIN I   LIPASE   URINALYSIS, REFLEX TO URINE CULTURE   POCT GLUCOSE      X-Ray Chest AP Portable   Final Result      No acute abnormality.         Electronically signed by: Louis Scott   Date:    07/12/2024   Time:    19:18      CT Head Without Contrast    (Results Pending)   CT Abdomen Pelvis With IV Contrast NO Oral Contrast    (Results Pending)   CT Cervical Spine Without Contrast    (Results Pending)       Treatments you received were:   Medications   lactated ringers bolus 500 mL (500 mLs Intravenous New Bag 7/12/24 1922)   iohexoL (OMNIPAQUE 350) injection 75 mL (75 mLs Intravenous Given 7/12/24 1948)       Home Care Instructions:  - Medications: Continue taking your home medications as prescribed    Follow-Up Plan:  - Follow-up with: Primary care doctor within 3 - 5  days  - Additional testing and/or evaluation will be directed by your primary doctor    Return to the Emergency Department for symptoms including but not limited to: worsening symptoms, severe back pain, shortness of breath or chest pain, vomiting with inability to hold down fluids, blood in vomit or poop, fevers greater than 100.4°F, passing out/fainting/unconsciousness, or other concerning symptoms.

## 2024-07-13 NOTE — ED NOTES
Patient put stool and urine in same sample cup. MD notified. MD said samples no longer needed anyway.

## 2024-07-13 NOTE — ED NOTES
Report received WONG Holbrook. Patient resting in bed w/ family at bedside. Bedside handoff performed. Patient on cardiac monitor. Complaints of dry mouth. MD notified.   MD asking for patient to be NPO at this time, but for RN to administer IV fluids.  MD notified of bladder volume. No additional orders at this time.

## 2024-07-13 NOTE — ED NOTES
Pt presents to ED with C/O light headiness, diarrhea weakness and increased wt loss over the last month. Pt and family has concerns.

## 2024-07-13 NOTE — ED NOTES
Patient resting in bed, denies need to void at this time. Reports urinary continence. Denies need to have a BM at this time. Reports she has not had one while here in the ER. Denies use of antibiotics PTA. Patient placed in isolation per C Diff protocol.

## 2024-07-15 DIAGNOSIS — I48.0 PAROXYSMAL ATRIAL FIBRILLATION: Primary | ICD-10-CM

## 2024-07-15 LAB
OHS QRS DURATION: 80 MS
OHS QTC CALCULATION: 435 MS

## 2024-07-18 ENCOUNTER — TELEPHONE (OUTPATIENT)
Dept: ELECTROPHYSIOLOGY | Facility: CLINIC | Age: 79
End: 2024-07-18
Payer: MEDICARE

## 2024-07-19 ENCOUNTER — OFFICE VISIT (OUTPATIENT)
Dept: CARDIOLOGY | Facility: CLINIC | Age: 79
End: 2024-07-19
Payer: MEDICARE

## 2024-07-19 VITALS
HEART RATE: 60 BPM | BODY MASS INDEX: 18.07 KG/M2 | SYSTOLIC BLOOD PRESSURE: 114 MMHG | WEIGHT: 102 LBS | HEIGHT: 63 IN | DIASTOLIC BLOOD PRESSURE: 68 MMHG

## 2024-07-19 DIAGNOSIS — I95.1 SYNCOPE DUE TO ORTHOSTATIC HYPOTENSION: ICD-10-CM

## 2024-07-19 DIAGNOSIS — I49.1 PREMATURE ATRIAL CONTRACTIONS: ICD-10-CM

## 2024-07-19 DIAGNOSIS — I70.0 AORTIC ATHEROSCLEROSIS: ICD-10-CM

## 2024-07-19 DIAGNOSIS — I10 ESSENTIAL HYPERTENSION: ICD-10-CM

## 2024-07-19 DIAGNOSIS — I48.0 PAROXYSMAL ATRIAL FIBRILLATION: Primary | ICD-10-CM

## 2024-07-19 DIAGNOSIS — R06.09 DYSPNEA ON EXERTION: ICD-10-CM

## 2024-07-19 LAB
OHS CV AF BURDEN PERCENT: < 1
OHS CV DC REMOTE DEVICE TYPE: NORMAL

## 2024-07-19 PROCEDURE — 1159F MED LIST DOCD IN RCRD: CPT | Mod: CPTII,S$GLB,, | Performed by: INTERNAL MEDICINE

## 2024-07-19 PROCEDURE — 1126F AMNT PAIN NOTED NONE PRSNT: CPT | Mod: CPTII,S$GLB,, | Performed by: INTERNAL MEDICINE

## 2024-07-19 PROCEDURE — 99214 OFFICE O/P EST MOD 30 MIN: CPT | Mod: S$GLB,,, | Performed by: INTERNAL MEDICINE

## 2024-07-19 PROCEDURE — 1160F RVW MEDS BY RX/DR IN RCRD: CPT | Mod: CPTII,S$GLB,, | Performed by: INTERNAL MEDICINE

## 2024-07-19 PROCEDURE — 99999 PR PBB SHADOW E&M-EST. PATIENT-LVL III: CPT | Mod: PBBFAC,,, | Performed by: INTERNAL MEDICINE

## 2024-07-19 PROCEDURE — 3078F DIAST BP <80 MM HG: CPT | Mod: CPTII,S$GLB,, | Performed by: INTERNAL MEDICINE

## 2024-07-19 PROCEDURE — 3074F SYST BP LT 130 MM HG: CPT | Mod: CPTII,S$GLB,, | Performed by: INTERNAL MEDICINE

## 2024-07-19 RX ORDER — ESCITALOPRAM OXALATE 5 MG/1
5 TABLET ORAL
COMMUNITY
Start: 2024-05-16

## 2024-07-19 RX ORDER — MEGESTROL ACETATE 40 MG/1
40 TABLET ORAL 3 TIMES DAILY
COMMUNITY
Start: 2024-04-22

## 2024-07-19 RX ORDER — MONTELUKAST SODIUM 10 MG/1
10 TABLET ORAL
COMMUNITY
Start: 2024-05-24

## 2024-07-19 RX ORDER — TIZANIDINE 4 MG/1
4 TABLET ORAL EVERY 6 HOURS PRN
COMMUNITY
Start: 2024-05-20

## 2024-07-19 RX ORDER — FERROUS SULFATE 324(65)MG
TABLET, DELAYED RELEASE (ENTERIC COATED) ORAL EVERY OTHER DAY
COMMUNITY
Start: 2024-07-01

## 2024-07-19 NOTE — PROGRESS NOTES
Subjective:    Patient ID:  Autumn Harris is a 79 y.o. female who presents for evaluation of No chief complaint on file.    Referring Cardiologist: Arjun Horn MD    HPI  I had the pleasure of seeing Mrs. Harris today in our electrophysiology clinic in follow-up for her syncope. As you are aware she is a 79 year-old woman with hypertension and syncope. She was admitted to [facility] in 2017 for an episode of near syncope that was felt to be vasovagal (light-headedness with sudden urge to have a bowel movement) and volume depletion in etiology. She then presented to the ER on 6/27/2021 for an episode of syncope. She was sitting on the toilet, talking with her daughter on the phone, began repeating herself, then passed out. She somehow made it to her bed however and did not remember how she got there from the bathroom. Work up in ER was unremarkable (was quite hypertensive however). She saw Dr. Horn in follow-up. A holter monitor noted frequent PACs (1.6% burden) and runs of nonsustained AT up to 13 beats in duration. Average sinus rate was 73 bpm.  After discussion she elected for ILR implant (done in 2021)    A stress echo from 2019 noted normal LV function and no ischemia.    11/2023: Mrs. Harris returns for follow-up. Recently observed to have new onset pAF (had one episode lasting 47 minutes). She was asymptomatic. Had to have emergency gall bladder surgery in June 2023 while on vacation in [place]. Recent ECHO at [facility] noted normal LV function. Discussed in setting of asymptomatic device detected AF of a low burden I would not recommend anticoagulation yet. She has had only a single 45 minute episode. Discussed ILR replacement however when this battery runs out. She understood.    Interim Hx:  Mrs. Harris returns for follow-up. No AF on ILR monitoring. Reports palpitations/tachycardia 2 weeks ago. In-clinic ILR check notes no arrhythmias. Reports worsening shortness of breath with exertion  causing her to stop what she is doing.      Review of Systems   Constitutional: Negative for fever and malaise/fatigue.   HENT:  Negative for congestion and sore throat.    Eyes:  Negative for blurred vision and visual disturbance.   Cardiovascular:  Positive for dyspnea on exertion and palpitations. Negative for chest pain, irregular heartbeat, near-syncope and syncope.   Respiratory:  Negative for cough and shortness of breath.    Hematologic/Lymphatic: Negative for bleeding problem. Does not bruise/bleed easily.   Skin: Negative.    Musculoskeletal: Negative.    Gastrointestinal:  Negative for bloating, abdominal pain, hematochezia and melena.   Neurological:  Negative for focal weakness and weakness.   Psychiatric/Behavioral: Negative.          Objective:    Physical Exam  Vitals reviewed.   Constitutional:       General: She is not in acute distress.     Appearance: She is well-developed. She is not diaphoretic.   HENT:      Head: Normocephalic and atraumatic.   Eyes:      General:         Right eye: No discharge.         Left eye: No discharge.      Conjunctiva/sclera: Conjunctivae normal.   Cardiovascular:      Rate and Rhythm: Normal rate and regular rhythm.      Heart sounds: No murmur heard.     No friction rub. No gallop.   Pulmonary:      Effort: Pulmonary effort is normal. No respiratory distress.      Breath sounds: Normal breath sounds. No wheezing or rales.   Abdominal:      General: Bowel sounds are normal. There is no distension.      Palpations: Abdomen is soft.      Tenderness: There is no abdominal tenderness.   Musculoskeletal:      Cervical back: Neck supple.   Skin:     General: Skin is warm and dry.   Neurological:      Mental Status: She is alert and oriented to person, place, and time.   Psychiatric:         Behavior: Behavior normal.         Thought Content: Thought content normal.         Judgment: Judgment normal.           Assessment:       1. Paroxysmal atrial fibrillation    2.  Premature atrial contractions    3. Syncope due to orthostatic hypotension    4. Essential hypertension    5. Aortic atherosclerosis         Plan:       In summary, Mrs. Harris is a 79 year-old woman with hypertension and syncope, possibly vasovagal, s/p ILR with new onset asymptomatic device detected AF. I had a long discussion with the patient about the pathophysiology and risks of atrial fibrillation and its basic pathophysiology, including its health implications and treatment options. Specifically, I addressed the need for CVA (stroke) prophylaxis. Discussed in setting of asymptomatic device detected AF of a low burden I would not recommend anticoagulation yet. She has had only a single 45 minute episode. Discussed ILR replacement however when this battery runs out. She understood. Will get a nuclear stress test to evaluate dyspnea on exertion. Recent ECHO normal.    RTC in 1 year.    Thank you for allowing me to participate in the care of this patient. Please do not hesitate to call me with any questions or concerns.    Cristhian Alvares MD, PhD  Cardiac Electrophysiology

## 2024-08-05 ENCOUNTER — CLINICAL SUPPORT (OUTPATIENT)
Dept: CARDIOLOGY | Facility: HOSPITAL | Age: 79
End: 2024-08-05
Attending: INTERNAL MEDICINE
Payer: MEDICARE

## 2024-08-05 DIAGNOSIS — I48.0 PAROXYSMAL ATRIAL FIBRILLATION: ICD-10-CM

## 2024-08-05 PROCEDURE — 93298 REM INTERROG DEV EVAL SCRMS: CPT | Mod: 26,,, | Performed by: INTERNAL MEDICINE

## 2024-08-05 PROCEDURE — 93298 REM INTERROG DEV EVAL SCRMS: CPT | Performed by: INTERNAL MEDICINE

## 2024-08-15 LAB
OHS CV AF BURDEN PERCENT: < 1
OHS CV DC REMOTE DEVICE TYPE: NORMAL

## 2024-09-05 ENCOUNTER — TELEPHONE (OUTPATIENT)
Dept: CARDIOLOGY | Facility: HOSPITAL | Age: 79
End: 2024-09-05
Payer: MEDICARE

## 2024-09-05 ENCOUNTER — CLINICAL SUPPORT (OUTPATIENT)
Dept: CARDIOLOGY | Facility: HOSPITAL | Age: 79
End: 2024-09-05
Attending: INTERNAL MEDICINE
Payer: MEDICARE

## 2024-09-05 DIAGNOSIS — I48.0 PAROXYSMAL ATRIAL FIBRILLATION: ICD-10-CM

## 2024-09-05 PROCEDURE — 93298 REM INTERROG DEV EVAL SCRMS: CPT | Mod: 26,,, | Performed by: INTERNAL MEDICINE

## 2024-09-05 PROCEDURE — 93298 REM INTERROG DEV EVAL SCRMS: CPT | Performed by: INTERNAL MEDICINE

## 2024-09-09 ENCOUNTER — HOSPITAL ENCOUNTER (OUTPATIENT)
Dept: CARDIOLOGY | Facility: HOSPITAL | Age: 79
Discharge: HOME | End: 2024-09-09
Attending: INTERNAL MEDICINE
Payer: MEDICARE

## 2024-09-09 VITALS
WEIGHT: 102 LBS | DIASTOLIC BLOOD PRESSURE: 55 MMHG | SYSTOLIC BLOOD PRESSURE: 124 MMHG | BODY MASS INDEX: 18.07 KG/M2 | HEIGHT: 63 IN | HEART RATE: 77 BPM | RESPIRATION RATE: 16 BRPM

## 2024-09-09 DIAGNOSIS — R06.09 DYSPNEA ON EXERTION: ICD-10-CM

## 2024-09-09 LAB
CFR FLOW - ANTERIOR: 1.71
CFR FLOW - INFERIOR: 2.63
CFR FLOW - LATERAL: 2.2
CFR FLOW - MAX: 3.54
CFR FLOW - MIN: 1.29
CFR FLOW - SEPTAL: 2.4
CFR FLOW - WHOLE HEART: 2.24
CV STRESS BASE HR: 58 BPM
DIASTOLIC BLOOD PRESSURE: 72 MMHG
EJECTION FRACTION- HIGH: 59 %
END DIASTOLIC INDEX-HIGH: 155 ML/M2
END DIASTOLIC INDEX-LOW: 91 ML/M2
END SYSTOLIC INDEX-HIGH: 78 ML/M2
END SYSTOLIC INDEX-LOW: 40 ML/M2
NUC REST DIASTOLIC VOLUME INDEX: 45
NUC REST EJECTION FRACTION: 79
NUC REST SYSTOLIC VOLUME INDEX: 9
NUC STRESS DIASTOLIC VOLUME INDEX: 53
NUC STRESS EJECTION FRACTION: 80 %
NUC STRESS SYSTOLIC VOLUME INDEX: 10
OHS CV CPX 1 MINUTE RECOVERY HEART RATE: 91 BPM
OHS CV CPX 85 PERCENT MAX PREDICTED HEART RATE MALE: 120
OHS CV CPX MAX PREDICTED HEART RATE: 141
OHS CV CPX PATIENT IS FEMALE: 1
OHS CV CPX PATIENT IS MALE: 0
OHS CV CPX PEAK DIASTOLIC BLOOD PRESSURE: 50 MMHG
OHS CV CPX PEAK HEAR RATE: 74 BPM
OHS CV CPX PEAK RATE PRESSURE PRODUCT: 8510
OHS CV CPX PEAK SYSTOLIC BLOOD PRESSURE: 115 MMHG
OHS CV CPX PERCENT MAX PREDICTED HEART RATE ACHIEVED: 54
OHS CV CPX RATE PRESSURE PRODUCT PRESENTING: 8236
PERFUSION DEFECT 1 SIZE IN %: 7 %
REST FLOW - ANTERIOR: 1.68 CC/MIN/G
REST FLOW - INFERIOR: 0.92 CC/MIN/G
REST FLOW - LATERAL: 1.27 CC/MIN/G
REST FLOW - MAX: 2.04 CC/MIN/G
REST FLOW - MIN: 0.58 CC/MIN/G
REST FLOW - SEPTAL: 1.03 CC/MIN/G
REST FLOW - WHOLE HEART: 1.23 CC/MIN/G
RETIRED EF AND QEF - SEE NOTES: 47 %
STRESS FLOW - ANTERIOR: 2.82 CC/MIN/G
STRESS FLOW - INFERIOR: 2.38 CC/MIN/G
STRESS FLOW - LATERAL: 2.74 CC/MIN/G
STRESS FLOW - MAX: 3.24 CC/MIN/G
STRESS FLOW - MIN: 1.73 CC/MIN/G
STRESS FLOW - SEPTAL: 2.42 CC/MIN/G
STRESS FLOW - WHOLE HEART: 2.59 CC/MIN/G
SYSTOLIC BLOOD PRESSURE: 142 MMHG

## 2024-09-09 PROCEDURE — 78431 MYOCRD IMG PET RST&STRS CT: CPT

## 2024-09-09 PROCEDURE — 78431 MYOCRD IMG PET RST&STRS CT: CPT | Mod: 26,,, | Performed by: INTERNAL MEDICINE

## 2024-09-09 PROCEDURE — 78434 AQMBF PET REST & RX STRESS: CPT

## 2024-09-09 PROCEDURE — A9555 RB82 RUBIDIUM: HCPCS | Performed by: INTERNAL MEDICINE

## 2024-09-09 PROCEDURE — 93018 CV STRESS TEST I&R ONLY: CPT | Mod: ,,, | Performed by: INTERNAL MEDICINE

## 2024-09-09 PROCEDURE — 93016 CV STRESS TEST SUPVJ ONLY: CPT | Mod: ,,, | Performed by: INTERNAL MEDICINE

## 2024-09-09 PROCEDURE — 78434 AQMBF PET REST & RX STRESS: CPT | Mod: 26,,, | Performed by: INTERNAL MEDICINE

## 2024-09-09 PROCEDURE — 63600175 PHARM REV CODE 636 W HCPCS: Performed by: INTERNAL MEDICINE

## 2024-09-09 RX ORDER — AMINOPHYLLINE 25 MG/ML
75 INJECTION, SOLUTION INTRAVENOUS
Status: COMPLETED | OUTPATIENT
Start: 2024-09-09 | End: 2024-09-09

## 2024-09-09 RX ORDER — REGADENOSON 0.08 MG/ML
0.4 INJECTION, SOLUTION INTRAVENOUS
Status: COMPLETED | OUTPATIENT
Start: 2024-09-09 | End: 2024-09-09

## 2024-09-09 RX ADMIN — RUBIDIUM CHLORIDE RB-82 14.1 MILLICURIE: 150 INJECTION, SOLUTION INTRAVENOUS at 02:09

## 2024-09-09 RX ADMIN — REGADENOSON 0.4 MG: 0.08 INJECTION, SOLUTION INTRAVENOUS at 02:09

## 2024-09-09 RX ADMIN — RUBIDIUM CHLORIDE RB-82 14 MILLICURIE: 150 INJECTION, SOLUTION INTRAVENOUS at 02:09

## 2024-09-09 RX ADMIN — AMINOPHYLLINE 75 MG: 25 INJECTION, SOLUTION INTRAVENOUS at 02:09

## 2024-09-10 ENCOUNTER — PATIENT MESSAGE (OUTPATIENT)
Dept: ELECTROPHYSIOLOGY | Facility: CLINIC | Age: 79
End: 2024-09-10
Payer: MEDICARE

## 2024-09-11 LAB
OHS CV AF BURDEN PERCENT: < 1
OHS CV DC REMOTE DEVICE TYPE: NORMAL

## 2024-09-16 ENCOUNTER — TELEPHONE (OUTPATIENT)
Dept: ELECTROPHYSIOLOGY | Facility: CLINIC | Age: 79
End: 2024-09-16
Payer: MEDICARE

## 2024-09-16 DIAGNOSIS — R94.39 POSITIVE CARDIAC STRESS TEST: Primary | ICD-10-CM

## 2024-09-16 NOTE — TELEPHONE ENCOUNTER
Attempted to reach patient to notify her of abnormality on her stress test and would like for her to see Dr. Galan in [facility] clinic to discuss further. No answer, voicemail left

## 2024-10-18 ENCOUNTER — CLINICAL SUPPORT (OUTPATIENT)
Dept: CARDIOLOGY | Facility: HOSPITAL | Age: 79
End: 2024-10-18
Attending: INTERNAL MEDICINE
Payer: MEDICARE

## 2024-10-18 ENCOUNTER — CLINICAL SUPPORT (OUTPATIENT)
Dept: CARDIOLOGY | Facility: HOSPITAL | Age: 79
End: 2024-10-18
Payer: MEDICARE

## 2024-10-18 DIAGNOSIS — I48.0 PAROXYSMAL ATRIAL FIBRILLATION: ICD-10-CM

## 2024-10-18 PROCEDURE — 93298 REM INTERROG DEV EVAL SCRMS: CPT | Mod: 26,,, | Performed by: INTERNAL MEDICINE

## 2024-10-18 PROCEDURE — 93298 REM INTERROG DEV EVAL SCRMS: CPT | Performed by: INTERNAL MEDICINE

## 2024-10-24 ENCOUNTER — TELEPHONE (OUTPATIENT)
Dept: CARDIOLOGY | Facility: CLINIC | Age: 79
End: 2024-10-24
Payer: MEDICARE

## 2024-10-24 NOTE — TELEPHONE ENCOUNTER
----- Message from Candi sent at 10/24/2024  4:08 PM CDT -----  Regarding: Appt  Patient Silvia calling to get the correct date for her appt. Please call back @ [phone]. Thank you Candi

## 2024-10-28 ENCOUNTER — OFFICE VISIT (OUTPATIENT)
Dept: CARDIOLOGY | Facility: CLINIC | Age: 79
End: 2024-10-28
Payer: MEDICARE

## 2024-10-28 VITALS
DIASTOLIC BLOOD PRESSURE: 84 MMHG | SYSTOLIC BLOOD PRESSURE: 146 MMHG | WEIGHT: 110 LBS | HEART RATE: 60 BPM | BODY MASS INDEX: 19.49 KG/M2

## 2024-10-28 DIAGNOSIS — I70.0 AORTIC ATHEROSCLEROSIS: Primary | ICD-10-CM

## 2024-10-28 DIAGNOSIS — R94.39 POSITIVE CARDIAC STRESS TEST: ICD-10-CM

## 2024-10-28 DIAGNOSIS — I48.0 PAROXYSMAL ATRIAL FIBRILLATION: ICD-10-CM

## 2024-10-28 DIAGNOSIS — I10 ESSENTIAL HYPERTENSION: ICD-10-CM

## 2024-10-28 LAB
OHS CV AF BURDEN PERCENT: < 1
OHS CV DC REMOTE DEVICE TYPE: NORMAL

## 2024-10-28 PROCEDURE — 99999 PR PBB SHADOW E&M-EST. PATIENT-LVL IV: CPT | Mod: PBBFAC,,, | Performed by: INTERNAL MEDICINE

## 2024-10-28 PROCEDURE — 3079F DIAST BP 80-89 MM HG: CPT | Mod: CPTII,S$GLB,, | Performed by: INTERNAL MEDICINE

## 2024-10-28 PROCEDURE — 1160F RVW MEDS BY RX/DR IN RCRD: CPT | Mod: CPTII,S$GLB,, | Performed by: INTERNAL MEDICINE

## 2024-10-28 PROCEDURE — 99204 OFFICE O/P NEW MOD 45 MIN: CPT | Mod: S$GLB,,, | Performed by: INTERNAL MEDICINE

## 2024-10-28 PROCEDURE — 3077F SYST BP >= 140 MM HG: CPT | Mod: CPTII,S$GLB,, | Performed by: INTERNAL MEDICINE

## 2024-10-28 PROCEDURE — 1100F PTFALLS ASSESS-DOCD GE2>/YR: CPT | Mod: CPTII,S$GLB,, | Performed by: INTERNAL MEDICINE

## 2024-10-28 PROCEDURE — 1126F AMNT PAIN NOTED NONE PRSNT: CPT | Mod: CPTII,S$GLB,, | Performed by: INTERNAL MEDICINE

## 2024-10-28 PROCEDURE — 3288F FALL RISK ASSESSMENT DOCD: CPT | Mod: CPTII,S$GLB,, | Performed by: INTERNAL MEDICINE

## 2024-10-28 PROCEDURE — 1159F MED LIST DOCD IN RCRD: CPT | Mod: CPTII,S$GLB,, | Performed by: INTERNAL MEDICINE

## 2024-10-28 RX ORDER — METOPROLOL SUCCINATE 25 MG/1
25 TABLET, EXTENDED RELEASE ORAL DAILY
Qty: 90 TABLET | Refills: 3 | Status: SHIPPED | OUTPATIENT
Start: 2024-10-28 | End: 2024-10-28

## 2024-10-28 RX ORDER — METOPROLOL SUCCINATE 25 MG/1
12.5 TABLET, EXTENDED RELEASE ORAL DAILY
Qty: 45 TABLET | Refills: 3 | Status: SHIPPED | OUTPATIENT
Start: 2024-10-28

## 2024-11-01 LAB
OHS QRS DURATION: 98 MS
OHS QTC CALCULATION: 497 MS

## 2024-11-18 ENCOUNTER — CLINICAL SUPPORT (OUTPATIENT)
Dept: CARDIOLOGY | Facility: HOSPITAL | Age: 79
End: 2024-11-18
Payer: MEDICARE

## 2024-11-18 ENCOUNTER — CLINICAL SUPPORT (OUTPATIENT)
Dept: CARDIOLOGY | Facility: HOSPITAL | Age: 79
End: 2024-11-18
Attending: INTERNAL MEDICINE
Payer: MEDICARE

## 2024-11-18 DIAGNOSIS — I48.0 PAROXYSMAL ATRIAL FIBRILLATION: ICD-10-CM

## 2024-11-18 PROCEDURE — 93298 REM INTERROG DEV EVAL SCRMS: CPT | Performed by: INTERNAL MEDICINE

## 2024-11-18 PROCEDURE — 93298 REM INTERROG DEV EVAL SCRMS: CPT | Mod: 26,,, | Performed by: INTERNAL MEDICINE

## 2024-11-22 LAB
OHS CV AF BURDEN PERCENT: < 1
OHS CV DC REMOTE DEVICE TYPE: NORMAL

## 2024-12-19 ENCOUNTER — CLINICAL SUPPORT (OUTPATIENT)
Dept: CARDIOLOGY | Facility: HOSPITAL | Age: 79
End: 2024-12-19
Payer: MEDICARE

## 2024-12-19 ENCOUNTER — CLINICAL SUPPORT (OUTPATIENT)
Dept: CARDIOLOGY | Facility: HOSPITAL | Age: 79
End: 2024-12-19
Attending: INTERNAL MEDICINE
Payer: MEDICARE

## 2024-12-19 DIAGNOSIS — I48.0 PAROXYSMAL ATRIAL FIBRILLATION: ICD-10-CM

## 2024-12-19 PROCEDURE — 93298 REM INTERROG DEV EVAL SCRMS: CPT | Performed by: INTERNAL MEDICINE

## 2024-12-19 PROCEDURE — 93298 REM INTERROG DEV EVAL SCRMS: CPT | Mod: 26,,, | Performed by: INTERNAL MEDICINE

## 2025-01-08 LAB
OHS CV AF BURDEN PERCENT: < 1
OHS CV DC REMOTE DEVICE TYPE: NORMAL

## 2025-01-19 ENCOUNTER — CLINICAL SUPPORT (OUTPATIENT)
Dept: CARDIOLOGY | Facility: HOSPITAL | Age: 80
End: 2025-01-19
Payer: MEDICARE

## 2025-01-19 ENCOUNTER — CLINICAL SUPPORT (OUTPATIENT)
Dept: CARDIOLOGY | Facility: HOSPITAL | Age: 80
End: 2025-01-19
Attending: INTERNAL MEDICINE
Payer: MEDICARE

## 2025-01-19 DIAGNOSIS — I48.0 PAROXYSMAL ATRIAL FIBRILLATION: ICD-10-CM

## 2025-01-19 PROCEDURE — 93298 REM INTERROG DEV EVAL SCRMS: CPT | Performed by: INTERNAL MEDICINE

## 2025-01-19 PROCEDURE — 93298 REM INTERROG DEV EVAL SCRMS: CPT | Mod: 26,,, | Performed by: INTERNAL MEDICINE

## 2025-01-27 LAB
OHS CV AF BURDEN PERCENT: < 1
OHS CV DC REMOTE DEVICE TYPE: NORMAL

## 2025-02-19 ENCOUNTER — CLINICAL SUPPORT (OUTPATIENT)
Dept: CARDIOLOGY | Facility: HOSPITAL | Age: 80
End: 2025-02-19
Attending: INTERNAL MEDICINE
Payer: MEDICARE

## 2025-02-19 ENCOUNTER — CLINICAL SUPPORT (OUTPATIENT)
Dept: CARDIOLOGY | Facility: HOSPITAL | Age: 80
End: 2025-02-19
Payer: MEDICARE

## 2025-02-19 DIAGNOSIS — I48.0 PAROXYSMAL ATRIAL FIBRILLATION: ICD-10-CM

## 2025-02-19 PROCEDURE — 93298 REM INTERROG DEV EVAL SCRMS: CPT | Performed by: INTERNAL MEDICINE

## 2025-02-19 PROCEDURE — 93298 REM INTERROG DEV EVAL SCRMS: CPT | Mod: 26,,, | Performed by: INTERNAL MEDICINE

## 2025-02-23 ENCOUNTER — HOSPITAL ENCOUNTER (EMERGENCY)
Facility: HOSPITAL | Age: 80
Discharge: HOME | End: 2025-02-23
Attending: EMERGENCY MEDICINE
Payer: MEDICARE

## 2025-02-23 VITALS
SYSTOLIC BLOOD PRESSURE: 140 MMHG | HEART RATE: 66 BPM | TEMPERATURE: 98 F | BODY MASS INDEX: 21.16 KG/M2 | RESPIRATION RATE: 20 BRPM | OXYGEN SATURATION: 96 % | HEIGHT: 62 IN | WEIGHT: 115 LBS | DIASTOLIC BLOOD PRESSURE: 68 MMHG

## 2025-02-23 DIAGNOSIS — S20.212A RIB CONTUSION, LEFT, INITIAL ENCOUNTER: Primary | ICD-10-CM
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
## 2025-02-23 DIAGNOSIS — E04.1 THYROID NODULE: ICD-10-CM

## 2025-02-23 PROCEDURE — 99285 EMERGENCY DEPT VISIT HI MDM: CPT | Mod: 25

## 2025-02-23 PROCEDURE — 25000003 PHARM REV CODE 250: Performed by: EMERGENCY MEDICINE

## 2025-02-23 RX ORDER — ALBUTEROL SULFATE 90 UG/1
2 INHALANT RESPIRATORY (INHALATION) EVERY 4 HOURS PRN
COMMUNITY

## 2025-02-23 RX ORDER — DICYCLOMINE HYDROCHLORIDE 10 MG/1
10 CAPSULE ORAL DAILY PRN
COMMUNITY

## 2025-02-23 RX ORDER — ACETAMINOPHEN 500 MG
1000 TABLET ORAL
Status: COMPLETED | OUTPATIENT
Start: 2025-02-23 | End: 2025-02-23

## 2025-02-23 RX ADMIN — ACETAMINOPHEN 1000 MG: 500 TABLET ORAL at 03:02

## 2025-02-23 NOTE — ED PROVIDER NOTES
Encounter Date: 2/23/2025       History     Chief Complaint   Patient presents with    Fall     Reports trip and fall 2000 last night. C/o L rib pain and L arm pain. -hit head, -LOC, -blood thinners.      Patient is a 79-year-old female who tripped over a small space heater that she had next to her bed.  Patient fell onto left side and complains of left shoulder and left-sided rib pain.  This occurred last night.  She denies head trauma or loss of consciousness.  She takes no blood thinners.      Review of patient's allergies indicates:   Allergen Reactions    Sulfa (sulfonamide antibiotics)      Past Medical History:   Diagnosis Date    Anxiety     CKD (chronic kidney disease) stage 3, GFR 30-59 ml/min     COPD (chronic obstructive pulmonary disease)     Depression     Encounter for blood transfusion     Fibromyalgia     Hematuria     High cholesterol     Hypertension     Oral cancer     Proteinuria     Sore throat 7/3/2019    Urinary tract infection     Vitamin D deficiency      Past Surgical History:   Procedure Laterality Date    ABDOMINAL SURGERY      [place]    HYSTERECTOMY      INSERTION OF IMPLANTABLE LOOP RECORDER N/A 10/15/2021    Procedure: Insertion, Implantable Loop Recorder;  Surgeon: Cristhian Alvares MD;  Location: [facility] CATH LAB/EP;  Service: Cardiology;  Laterality: N/A;    IRRIGATION AND DEBRIDEMENT OF LOWER EXTREMITY Right 11/9/2022    Procedure: IRRIGATION AND DEBRIDEMENT, LOWER EXTREMITY, SECOND TOE;  Surgeon: Alfa Quintero DPM;  Location: [facility] OR;  Service: Podiatry;  Laterality: Right;  micro sagittal saw, vancomycin powder    OOPHORECTOMY      only one removed    Stomach Ulcer Surgery       Family History   Adopted: Yes   Problem Relation Name Age of Onset    Heart disease Mother      Pacemaker/defibrilator Brother      Heart disease Brother      Lung cancer Brother      Heart disease Maternal Aunt       Social History[1]  Review of Systems   Cardiovascular:  Positive for chest pain.    Musculoskeletal:  Negative for back pain and neck pain.        Left shoulder pain.   All other systems reviewed and are negative.      Physical Exam     Initial Vitals [02/23/25 1453]   BP Pulse Resp Temp SpO2   126/70 80 20 97.6 °F (36.4 °C) 97 %      MAP       --         Physical Exam    Nursing note and vitals reviewed.  Constitutional: No distress.   HENT:   Head: Atraumatic.   Neck: Neck supple.   Mild tenderness of the mid cervical vertebrae.   Cardiovascular:  Normal rate and regular rhythm.           Pulmonary/Chest: Breath sounds normal.   There is tenderness of the left lower lateral chest wall without crepitance.   Abdominal: Abdomen is soft.   Musculoskeletal:      Cervical back: Neck supple.      Comments: There is mild diffuse tenderness of the left shoulder without deformity.  Remainder of extremity exam is unremarkable.     Neurological: She is alert and oriented to person, place, and time. GCS score is 15. GCS eye subscore is 4. GCS verbal subscore is 5. GCS motor subscore is 6.   Skin: Skin is warm and dry.         ED Course   Procedures  Labs Reviewed - No data to display       Imaging Results              CT Cervical Spine Without Contrast (Final result)  Result time 02/23/25 17:48:15      Final result by Joel Mccloud MD (02/23/25 17:48:15)                   Impression:      1. No acute abnormality.  2. Multilevel mild chronic degenerative changes.  3. 2.5 cm thyroid nodule on the right incompletely visualized.  Recommend outpatient sonographic surveillance.  This is similar to the prior study.      Electronically signed by: Joel Mccloud  Date:    02/23/2025  Time:    17:48               Narrative:    EXAMINATION:  CT CERVICAL SPINE WITHOUT CONTRAST    CLINICAL HISTORY:  Neck trauma (Age >= 65y);    TECHNIQUE:  Low dose axial CT images through the cervical spine, with sagittal and coronal reformations.  Contrast was not administered.    COMPARISON:  07/12/2024    FINDINGS:  No acute  fractures of the cervical spine.  Mild grade 1 spondylolisthesis of C3 on C4.    Multilevel mild-moderate bilateral facet arthropathy.    Mild central disc bulge at C2-3 and C3-4.  No significant central canal or foraminal narrowing    Limited evaluation of the intraspinal contents demonstrates no hematoma or mass.Paraspinal soft tissues exhibit no acute abnormalities.    2.5 cm thyroid nodule on the right incompletely visualized. Recommend outpatient sonographic surveillance. This is similar to the prior study.                                       X-Ray Ribs 2 View Left (Final result)  Result time 02/23/25 17:09:41      Final result by Louis Scott DO (02/23/25 17:09:41)                   Impression:      No rib fracture.      Electronically signed by: Louis Scott  Date:    02/23/2025  Time:    17:09               Narrative:    EXAMINATION:  XR RIBS 2 VIEW LEFT    CLINICAL HISTORY:  Unspecified fall, initial encounter    TECHNIQUE:  Two views of the left ribs were performed.    COMPARISON:  07/12/2024.    FINDINGS:  There is a loop recorder device.  There are surgical clips.  There is no evidence of an acute fracture or dislocation of the left-sided ribs.  Alignment is normal.  Remaining visible osseous structures are intact.  The lungs are grossly clear.  There are vascular calcifications.                                       X-Ray Shoulder 2 or More Views Left (Final result)  Result time 02/23/25 17:09:11      Final result by Louis Scott DO (02/23/25 17:09:11)                   Impression:      No acute osseous abnormality of the shoulder.      Electronically signed by: Louis Scott  Date:    02/23/2025  Time:    17:09               Narrative:    EXAMINATION:  XR SHOULDER COMPLETE 2 OR MORE VIEWS LEFT    CLINICAL HISTORY:  fall;    TECHNIQUE:  Two or three views of the left shoulder were performed.    COMPARISON:  None    FINDINGS:  There is diffuse osteopenia.  There is no acute fracture or  dislocation of the left shoulder.  Alignment is normal.  The humeral head is well seated in the glenoid.  Acromioclavicular and glenohumeral joints are unremarkable.  Remaining osseous structures are intact.                                       Medications   acetaminophen tablet 1,000 mg (1,000 mg Oral Given 25 1524)     Medical Decision Making  Emergent evaluation of a 79-year-old female who fell at home last night injuring her right chest wall and right shoulder.  Imaging results are pending at the time of shift change and further care of the patient will be turned over to Dr. Scott pending results, reassessment and final disposition.    Amount and/or Complexity of Data Reviewed  Radiology: ordered.    Risk  OTC drugs.                                      Clinical Impression:  Final diagnoses:  [W19.XXXA] Fall  [S20.212A] Rib contusion, left, initial encounter (Primary)  [E04.1] Thyroid nodule          ED Disposition Condition    Discharge Stable          ED Prescriptions    None       Follow-up Information       Follow up With Specialties Details Why Contact Info    Your primary care physician   to schedule an ultrasound of your thyroid     [facility] Emergency Dept Emergency Medicine  As needed, If symptoms worsen [address] [facility] [id number] [phone]                 [1]   Social History  Tobacco Use    Smoking status: Former     Current packs/day: 0.00     Average packs/day: 1.5 packs/day for 45.0 years (67.5 ttl pk-yrs)     Types: Cigarettes     Start date: 1950     Quit date: 1995     Years since quittin.5    Smokeless tobacco: Never    Tobacco comments:     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   Substance Use Topics    Alcohol use: No    Drug use: Never        Wesley Gutierrez MD  [phone]

## 2025-02-23 NOTE — ED TRIAGE NOTES
Patient arrives to the ED w/ complaints of fall last night. Reports walking in house, turning around, and tripping over space heater. Denies dizziness or LOC. Reports aching, sore pain 8 out of 10 to L shoulder, back, ribs, abdomen, and hip. Reports pain worsens w/ movement. Denies hitting head in fall. Reports taking Tylenol last night w/ no relief.

## 2025-02-23 NOTE — ED NOTES
Patient complaining of nausea. Reports she has not eaten anything today PTA to ED. Requesting either food/drink or med for symptoms. MD notified.

## 2025-02-24 LAB
OHS CV AF BURDEN PERCENT: < 1
OHS CV DC REMOTE DEVICE TYPE: NORMAL

## 2025-02-24 NOTE — PROVIDER PROGRESS NOTES - EMERGENCY DEPT.
Encounter Date: 2/23/2025    ED Physician Progress Notes            79-year-old female signed out pending x-ray and CT C-spine reads.  No acute traumatic findings on x-ray or CT C-spine.  Discussed shoulder immobilizer for comfort but patient declined.  Also informed about incidental finding of thyroid nodule.  Discharged with return precautions and outpatient follow up.     No acute emergent medical condition has been identified. The patient appears to be low risk for an emergent medical condition is appropriate for discharge with outpatient f/u as detailed in discharge instructions for reevaluation and possible continued outpatient workup and management. I have discussed the workup with the patient, who has verbalized understanding of the plan and need for outpatient follow-up.  This evaluation does not preclude the development of an emergent condition so in addition, I have advised the patient that they can return to the ED at any time with worsening or change of their symptoms, or with any other medical complaint.

## 2025-02-24 NOTE — PHARMACY MED REC
"  [facility]           Pharmacy  Admission Medication History     The home medication history was taken by Asuncion Miranda.      Medication history obtained from Medications listed below were obtained from: Patient/family    Based on information gathered for medication list, you may go to "Admission" then "Reconcile Home Medications" tabs to review and/or act upon those items.     The home medication list has been updated by the Pharmacy department.   Please read ALL comments highlighted in yellow.   Please address this information as you see fit.    Feel free to contact us if you have any questions or require assistance.    The medications listed below were removed from the home medication list.  Please reorder if appropriate:    Patient reports NOT TAKING the following medication(s):  Voltaren gel  Zofran 4mg    No current facility-administered medications on file prior to encounter.     Current Outpatient Medications on File Prior to Encounter   Medication Sig Dispense Refill    albuterol (PROVENTIL/VENTOLIN HFA) 90 mcg/actuation inhaler Inhale 2 puffs into the lungs every 4 (four) hours as needed.      azelastine (ASTELIN) 137 mcg (0.1 %) nasal spray 2 sprays by Nasal route 2 (two) times daily.  5    busPIRone (BUSPAR) 5 MG Tab Take 5 mg by mouth 2 (two) times daily.      clorazepate (TRANXENE) 3.75 MG Tab Take 7.5 mg by mouth 2 (two) times daily. 2 tablets      dicyclomine (BENTYL) 10 MG capsule Take 10 mg by mouth daily as needed.      EScitalopram oxalate (LEXAPRO) 5 MG Tab Take 5 mg by mouth once daily.      ferrous sulfate 324 mg (65 mg iron) TbEC Take 324 mg by mouth every other day.      Lactobacillus acidoph-L.bulgar 1 million cell Chew Take 4 tablets by mouth 3 (three) times daily with meals. 60 tablet 0    megestroL (MEGACE) 40 MG Tab Take 40 mg by mouth 3 (three) times daily.      metoprolol succinate (TOPROL-XL) 25 MG 24 hr tablet Take 0.5 tablets (12.5 mg total) by mouth once " daily. 45 tablet 3    pantoprazole (PROTONIX) 40 MG tablet TAKE 1 TABLET BY MOUTH EVERY DAY (Patient taking differently: Take 40 mg by mouth once daily.) 90 tablet 3    tiZANidine (ZANAFLEX) 4 MG tablet Take 4 mg by mouth every 6 (six) hours as needed.      tramadol-acetaminophen 37.5-325 mg (ULTRACET) 37.5-325 mg Tab Take 1 tablet by mouth 3 (three) times daily.      blood sugar diagnostic Strp 1 strip by Misc.(Non-Drug; Combo Route) route 3 (three) times daily as needed. 30 each 3    BLOOD-GLUCOSE METER MISC by Misc.(Non-Drug; Combo Route) route.         Please address this information as you see fit.  Feel free to contact us if you have any questions or require assistance.    Asuncion Miranda  [phone]                .

## 2025-02-24 NOTE — DISCHARGE INSTRUCTIONS

## 2025-03-20 RX ORDER — PANTOPRAZOLE SODIUM 40 MG/1
40 TABLET, DELAYED RELEASE ORAL DAILY
Qty: 90 TABLET | Refills: 3 | Status: SHIPPED | OUTPATIENT
Start: 2025-03-20

## 2025-03-20 NOTE — TELEPHONE ENCOUNTER
I spoke with patient's daughter, refill request for Pantoprazole, Dr Horn has retired, I will forward request to patient's current cardiologist, Dr. Missy Galan.

## 2025-03-22 ENCOUNTER — CLINICAL SUPPORT (OUTPATIENT)
Dept: CARDIOLOGY | Facility: HOSPITAL | Age: 80
End: 2025-03-22
Attending: INTERNAL MEDICINE
Payer: MEDICARE

## 2025-03-22 ENCOUNTER — CLINICAL SUPPORT (OUTPATIENT)
Dept: CARDIOLOGY | Facility: HOSPITAL | Age: 80
End: 2025-03-22
Payer: MEDICARE

## 2025-03-22 DIAGNOSIS — I48.0 PAROXYSMAL ATRIAL FIBRILLATION: ICD-10-CM

## 2025-03-22 PROCEDURE — 93298 REM INTERROG DEV EVAL SCRMS: CPT | Performed by: INTERNAL MEDICINE

## 2025-03-22 PROCEDURE — 93298 REM INTERROG DEV EVAL SCRMS: CPT | Mod: 26,,, | Performed by: INTERNAL MEDICINE

## 2025-03-25 LAB
OHS CV AF BURDEN PERCENT: < 1
OHS CV DC REMOTE DEVICE TYPE: NORMAL

## 2025-03-28 ENCOUNTER — TELEPHONE (OUTPATIENT)
Dept: CARDIOLOGY | Facility: CLINIC | Age: 80
End: 2025-03-28
Payer: MEDICARE

## 2025-03-28 NOTE — TELEPHONE ENCOUNTER
----- Message from Candi sent at 3/28/2025  3:58 PM CDT -----  Regarding: Appt  Patient caregiver Silvia calling to get an evening appt, only date was available was in July. Please call back @ [phone]. Thank you Candi

## 2025-04-04 ENCOUNTER — PATIENT MESSAGE (OUTPATIENT)
Dept: CARDIOLOGY | Facility: CLINIC | Age: 80
End: 2025-04-04
Payer: MEDICARE

## 2025-04-22 ENCOUNTER — CLINICAL SUPPORT (OUTPATIENT)
Dept: CARDIOLOGY | Facility: HOSPITAL | Age: 80
End: 2025-04-22
Attending: INTERNAL MEDICINE
Payer: MEDICARE

## 2025-04-22 ENCOUNTER — CLINICAL SUPPORT (OUTPATIENT)
Dept: CARDIOLOGY | Facility: HOSPITAL | Age: 80
End: 2025-04-22
Payer: MEDICARE

## 2025-04-22 DIAGNOSIS — I48.0 PAROXYSMAL ATRIAL FIBRILLATION: ICD-10-CM

## 2025-04-22 PROCEDURE — 93298 REM INTERROG DEV EVAL SCRMS: CPT | Performed by: INTERNAL MEDICINE

## 2025-04-22 PROCEDURE — 93298 REM INTERROG DEV EVAL SCRMS: CPT | Mod: 26,,, | Performed by: INTERNAL MEDICINE

## 2025-04-28 LAB
OHS CV AF BURDEN PERCENT: < 1
OHS CV DC REMOTE DEVICE TYPE: NORMAL

## 2025-04-30 ENCOUNTER — TELEPHONE (OUTPATIENT)
Dept: CARDIOLOGY | Facility: CLINIC | Age: 80
End: 2025-04-30
Payer: MEDICARE

## 2025-04-30 NOTE — TELEPHONE ENCOUNTER
----- Message from Juana sent at 4/29/2025  3:30 PM CDT -----  Regarding: r/s  Please call pt at [phone] to reschedule the appt she missed this afternoon. Thank you

## 2025-05-07 ENCOUNTER — TELEPHONE (OUTPATIENT)
Dept: CARDIOLOGY | Facility: CLINIC | Age: 80
End: 2025-05-07
Payer: MEDICARE

## 2025-05-07 NOTE — TELEPHONE ENCOUNTER
----- Message from Candi sent at 5/7/2025 10:00 AM CDT -----  Patient daughter Juana calling regarding an earlier appointment. Please call back @ [phone]. Thank you Candi

## 2025-05-14 ENCOUNTER — OFFICE VISIT (OUTPATIENT)
Dept: CARDIOLOGY | Facility: CLINIC | Age: 80
End: 2025-05-14
Payer: MEDICARE

## 2025-05-14 VITALS
DIASTOLIC BLOOD PRESSURE: 75 MMHG | SYSTOLIC BLOOD PRESSURE: 122 MMHG | HEIGHT: 62 IN | WEIGHT: 115.94 LBS | BODY MASS INDEX: 21.34 KG/M2 | HEART RATE: 72 BPM

## 2025-05-14 DIAGNOSIS — R06.02 SOB (SHORTNESS OF BREATH): ICD-10-CM

## 2025-05-14 DIAGNOSIS — I10 ESSENTIAL HYPERTENSION: ICD-10-CM

## 2025-05-14 DIAGNOSIS — I48.0 PAROXYSMAL ATRIAL FIBRILLATION: Primary | ICD-10-CM

## 2025-05-14 DIAGNOSIS — I25.10 CORONARY ARTERY DISEASE INVOLVING NATIVE CORONARY ARTERY OF NATIVE HEART WITHOUT ANGINA PECTORIS: ICD-10-CM

## 2025-05-14 DIAGNOSIS — I70.0 AORTIC ATHEROSCLEROSIS: ICD-10-CM

## 2025-05-14 PROBLEM — I13.0 HYPERTENSIVE HEART AND CHRONIC KIDNEY DISEASE WITH HEART FAILURE AND STAGE 1 THROUGH STAGE 4 CHRONIC KIDNEY DISEASE, OR UNSPECIFIED CHRONIC KIDNEY DISEASE: Status: RESOLVED | Noted: 2025-05-14 | Resolved: 2025-05-14

## 2025-05-14 PROBLEM — I13.0 HYPERTENSIVE HEART AND CHRONIC KIDNEY DISEASE WITH HEART FAILURE AND STAGE 1 THROUGH STAGE 4 CHRONIC KIDNEY DISEASE, OR UNSPECIFIED CHRONIC KIDNEY DISEASE: Status: ACTIVE | Noted: 2025-05-14

## 2025-05-14 PROCEDURE — 1125F AMNT PAIN NOTED PAIN PRSNT: CPT | Mod: CPTII,S$GLB,, | Performed by: INTERNAL MEDICINE

## 2025-05-14 PROCEDURE — 3288F FALL RISK ASSESSMENT DOCD: CPT | Mod: CPTII,S$GLB,, | Performed by: INTERNAL MEDICINE

## 2025-05-14 PROCEDURE — 1160F RVW MEDS BY RX/DR IN RCRD: CPT | Mod: CPTII,S$GLB,, | Performed by: INTERNAL MEDICINE

## 2025-05-14 PROCEDURE — 1100F PTFALLS ASSESS-DOCD GE2>/YR: CPT | Mod: CPTII,S$GLB,, | Performed by: INTERNAL MEDICINE

## 2025-05-14 PROCEDURE — 99214 OFFICE O/P EST MOD 30 MIN: CPT | Mod: S$GLB,,, | Performed by: INTERNAL MEDICINE

## 2025-05-14 PROCEDURE — 3078F DIAST BP <80 MM HG: CPT | Mod: CPTII,S$GLB,, | Performed by: INTERNAL MEDICINE

## 2025-05-14 PROCEDURE — 1159F MED LIST DOCD IN RCRD: CPT | Mod: CPTII,S$GLB,, | Performed by: INTERNAL MEDICINE

## 2025-05-14 PROCEDURE — 3074F SYST BP LT 130 MM HG: CPT | Mod: CPTII,S$GLB,, | Performed by: INTERNAL MEDICINE

## 2025-05-14 PROCEDURE — 99999 PR PBB SHADOW E&M-EST. PATIENT-LVL III: CPT | Mod: PBBFAC,,, | Performed by: INTERNAL MEDICINE

## 2025-05-14 RX ORDER — SPIRONOLACTONE 25 MG/1
12.5 TABLET ORAL DAILY
Qty: 45 TABLET | Refills: 3 | Status: SHIPPED | OUTPATIENT
Start: 2025-05-14

## 2025-05-14 RX ORDER — ROSUVASTATIN CALCIUM 5 MG/1
5 TABLET, COATED ORAL DAILY
Qty: 90 TABLET | Refills: 3 | Status: SHIPPED | OUTPATIENT
Start: 2025-05-14

## 2025-05-14 RX ORDER — METOPROLOL SUCCINATE 25 MG/1
12.5 TABLET, EXTENDED RELEASE ORAL DAILY
Qty: 45 TABLET | Refills: 3 | Status: SHIPPED | OUTPATIENT
Start: 2025-05-14

## 2025-05-14 NOTE — PROGRESS NOTES
HISTORY:    79-year-old female with a history of CAD on CT chest, paroxysmal atrial fibrillation, aortic atherosclerosis, orthostatic hypotension, COPD, quit tob '95 presenting for 2nd visit with me.    Patient referred by Dr. Alvares for eval of CAD in late '24. PET stress in '24 was abnormal. Previously followed by Dr. Horn who retired. Follows w Dr. Alvares for palpitations. Has been noted to have a very low afib burden that is being managed expectantly w a loop recorder in place.     Does note chest pain lasting minutes at a time. Usually with associated palpitations. Extended monitoring does not show a corresponding arrhythmia. Symptoms stable over 15-20 years. Does have PEREZ with associated palpitations as well. Also present for years.     Above symptoms don't bother her that much. Her major complaint is fatigue and sleepiness.     Daughter is present and suggests symptoms are being underplayed.     Since last visit she had a mechanical fall with a LLE sprain.     Denies a h/o MI/CVA/PAD.     Tolerates asa 81x1, metoprolol 12.5x1, spironolactone 12.5x1.      PHYSICAL EXAM:    Vitals:    05/14/25 1333   BP: 122/75   Pulse: 72     NAD, A+Ox3.  No jvd, no bruit.  RRR nml s1,s2. No murmurs.  CTA B no wheezes or crackles.  No edema.    LABS/STUDIES (imaging reviewed during clinic visit):    April 2025 CMP normal.  July 2024 CBC and CMP unremarkable.  Troponin negative.  TSH normal.  March A1c normal.  /HDL 46/LDL 82/TG 65.  .    ECG 2024 demonstrates sinus rhythm with anterior infarct pattern vs PRWP.  Anterolateral T-wave abnormality.  Loop interrogation March 2025 no evidence of AFib.  TTE March 2024 normal LV size with concentric remodeling and EF of 55-60%.  CVP 3.    PET stress September 2024 normal LVEF.  Small-sized, moderate intensity mid septal fixed perfusion abnormality which improves with stress involving 7% of LV myocardium consistent with a nontransmural scar.    ASSESSMENT &  PLAN:    1. Paroxysmal atrial fibrillation    2. Aortic atherosclerosis    3. Essential hypertension    4. Coronary artery disease involving native coronary artery of native heart without angina pectoris    5. SOB (shortness of breath)          Orders Placed This Encounter    rosuvastatin (CRESTOR) 5 MG tablet    metoprolol succinate (TOPROL-XL) 25 MG 24 hr tablet    spironolactone (ALDACTONE) 25 MG tablet        Pt with CAD on CT chest and a low risk PET stress with a fixed defect. ECG abnormal. Discussed invasive vs non-invasive management at initial visit and again today and patient prefers noninvasive management.    Stable on asa 81x1, metoprolol 12.5x1, spironolactone 12.5x1.     Bps controlled. H/o orthostatic hypotension that has resolved.     pAfib w low burden on ILR. 45 minutes, one time episode. AC deferred. Follows w Dr. Alvares. Palpitations not c/w arrhythmia.     Statin tx reasonable to reduce CV risk. Pt was tentative to start. More amenable today. Trial rosuvastatin 5x1.     Pt lives by herself. Unclear if she is taking above medicines accurately after discussing with daughter. Pt herself not sure about regimen, but we believe she is taking above meds. At the end of the day, pt appears to only want urgent interventions and minimal medical interventions.    No follow-ups on file.      Missy Galan MD

## 2025-05-23 ENCOUNTER — CLINICAL SUPPORT (OUTPATIENT)
Dept: CARDIOLOGY | Facility: HOSPITAL | Age: 80
End: 2025-05-23
Attending: INTERNAL MEDICINE
Payer: MEDICARE

## 2025-05-23 ENCOUNTER — CLINICAL SUPPORT (OUTPATIENT)
Dept: CARDIOLOGY | Facility: HOSPITAL | Age: 80
End: 2025-05-23
Payer: MEDICARE

## 2025-05-23 DIAGNOSIS — I48.0 PAROXYSMAL ATRIAL FIBRILLATION: ICD-10-CM

## 2025-05-23 PROCEDURE — 93298 REM INTERROG DEV EVAL SCRMS: CPT | Performed by: INTERNAL MEDICINE

## 2025-05-23 PROCEDURE — 93298 REM INTERROG DEV EVAL SCRMS: CPT | Mod: 26,,, | Performed by: INTERNAL MEDICINE

## 2025-05-27 NOTE — PROVIDER TRANSFER
Outside Transfer Acceptance Note / Regional Referral Center    Referring facility: [facility]   Referring provider: KRYSTYNA CHAMBERS  Accepting facility: OUTSIDE FACILITY  Accepting provider: PATSY BENITEZ  Admitting provider: Patsy Benitez   Reason for transfer:  Need for Podiatry  Transfer diagnosis: Open Displaced Fracture of middle phalanx of lesser toe of right foot  Transfer specialty requested: Podiatry  Transfer specialty notified: no  Transfer level: NUMBER 1-5: 2  Bed type requested: Med/Surg  Isolation status: No active isolations   Admission class or status: IP- Inpatient      Narrative       I spoke with Dr. Chambers.     Mrs. Harris is a 77yoF w/PMHx of anxiety, CKD stage 3, COPD (on RA), depression, DM2, HLD, HTN who presented to ED for right 2nd toe fracture. Patient was seen in Urgent Care today. Stated that she had dropped a rug on her right 2nd toe 2d ago. Concern for bone protruding. Had worsening pain with purple discoloration prompting her to report to Urgent care for evaluation- noted to have exposed bone at PIP join of right 2nd toe- necrosis appreciated. Told to report to nearest ED. Upon arrival, ortho evaluated patient--recommended transfer to facility with podiatry for OR washout and possible amputation. Given clinda in ED. All other facilities with podiatry have no available beds. Awaiting transfer to [facility].     V: 98.3 deg F, HR 97, RR 18, /85, SpO2 97%, Wt 65.8kg    L: WBC 7.4, Hg 11.8, Hct 38.4, MCV 82, Plt 287, % Gran 64.2, Na 135, K 3.8, CO2 14, AG 11, BUN 18, sCr 1.4, Glu 126, ALP 95, AST 29, ALT 11, CRP 12.9    I: Xray of Rt foot- second toe fracture- middle phalanx of the 2nd toe    Meds Given: Clinda 900mg IV x1, lidocaine 5mL injection    Instructions      [facility]-  Admit to Hospital Medicine  Upon patient arrival to floor, please send SecureChat to [facility] or call extension [phone] (if no answer, this will flip to a beeper, so enter your call  Pt states he could not get his vyvanse from the [facility] and needs it sent to an out of state pharmacy in [place]. Office did confirm that the script sent on 5/24/25 was not picked up. Please advise.    back number) for Hospital Medicine admit team assignment and for additional admit orders for the patient.  Do not page the attending physician associated with the patient on arrival (this physician may not be on duty at the time of arrival).  Rather, always call [phone] to reach the triage physician for orders and team assignment.    Plan:  - Consult podiatry   - Wound care      Michelle Benitez MD  Department of Hospital Medicine  Department of Pediatrics  11/8/2022

## 2025-05-29 LAB
OHS CV AF BURDEN PERCENT: < 1
OHS CV DC REMOTE DEVICE TYPE: NORMAL

## 2025-06-23 ENCOUNTER — CLINICAL SUPPORT (OUTPATIENT)
Dept: CARDIOLOGY | Facility: HOSPITAL | Age: 80
End: 2025-06-23
Attending: INTERNAL MEDICINE
Payer: MEDICARE

## 2025-06-23 ENCOUNTER — CLINICAL SUPPORT (OUTPATIENT)
Dept: CARDIOLOGY | Facility: HOSPITAL | Age: 80
End: 2025-06-23
Payer: MEDICARE

## 2025-06-23 DIAGNOSIS — I48.0 PAROXYSMAL ATRIAL FIBRILLATION: ICD-10-CM

## 2025-06-23 PROCEDURE — 93298 REM INTERROG DEV EVAL SCRMS: CPT | Mod: 26,,, | Performed by: INTERNAL MEDICINE

## 2025-06-23 PROCEDURE — 93298 REM INTERROG DEV EVAL SCRMS: CPT | Performed by: INTERNAL MEDICINE

## 2025-06-27 LAB
OHS CV AF BURDEN PERCENT: < 1
OHS CV DC REMOTE DEVICE TYPE: NORMAL

## 2025-07-07 ENCOUNTER — TELEPHONE (OUTPATIENT)
Dept: ELECTROPHYSIOLOGY | Facility: CLINIC | Age: 80
End: 2025-07-07
Payer: MEDICARE

## 2025-07-09 ENCOUNTER — PATIENT MESSAGE (OUTPATIENT)
Dept: ELECTROPHYSIOLOGY | Facility: CLINIC | Age: 80
End: 2025-07-09
Payer: MEDICARE

## 2025-07-11 ENCOUNTER — PATIENT MESSAGE (OUTPATIENT)
Dept: CARDIOLOGY | Facility: CLINIC | Age: 80
End: 2025-07-11
Payer: MEDICARE

## 2025-07-16 ENCOUNTER — TELEPHONE (OUTPATIENT)
Dept: ELECTROPHYSIOLOGY | Facility: CLINIC | Age: 80
End: 2025-07-16
Payer: MEDICARE

## 2025-07-16 NOTE — TELEPHONE ENCOUNTER
Attempted to contact patient to discuss ILR removal and replacement but no answer received on home or mobile number. Voicemail left on home number requesting return call to discuss procedure scheduling, unable to leave voicemail on mobile number as voicemail was full. Patient portal previously sent regarding procedure scheduling, which appears to have been read, however no response received. Will resend patient portal message as well in effort to contact patient.

## 2025-07-24 ENCOUNTER — CLINICAL SUPPORT (OUTPATIENT)
Dept: CARDIOLOGY | Facility: HOSPITAL | Age: 80
End: 2025-07-24
Payer: MEDICARE

## 2025-07-24 ENCOUNTER — CLINICAL SUPPORT (OUTPATIENT)
Dept: CARDIOLOGY | Facility: HOSPITAL | Age: 80
End: 2025-07-24
Attending: INTERNAL MEDICINE
Payer: MEDICARE

## 2025-07-24 DIAGNOSIS — I48.0 PAROXYSMAL ATRIAL FIBRILLATION: ICD-10-CM

## 2025-07-24 PROCEDURE — 93298 REM INTERROG DEV EVAL SCRMS: CPT | Mod: 26,,, | Performed by: INTERNAL MEDICINE

## 2025-07-24 PROCEDURE — 93298 REM INTERROG DEV EVAL SCRMS: CPT | Performed by: INTERNAL MEDICINE

## 2025-07-28 LAB
OHS CV AF BURDEN PERCENT: < 1
OHS CV DC REMOTE DEVICE TYPE: NORMAL

## 2025-08-01 ENCOUNTER — TELEPHONE (OUTPATIENT)
Dept: ELECTROPHYSIOLOGY | Facility: CLINIC | Age: 80
End: 2025-08-01
Payer: MEDICARE

## 2025-08-01 NOTE — TELEPHONE ENCOUNTER
Spoke with daughter Deisy regarding scheduling ILR removal/reimplant. Daughter states that the patient resides with her now as her dementia has progressed and is extremely hard to transport . Daughter also states that  she would prefer to wait until after her mom is seen by [facility] as they are anticipating possible need for an angiogram and with her current status they are unsure if they want to proceed with the ILR removal and replacement at this time. Daughter advise that her mother's loop recorder triggered need for replacement on 7/2/2025 and the battery will typically only 3 months from the time it triggers. Advised that after that 3 months the device will no longer work and we will no longer receive any information for monitoring. Daughter verbalized understanding and states that she will call back if they decide to proceed with scheduling.

## 2025-08-27 ENCOUNTER — OFFICE VISIT (OUTPATIENT)
Dept: CARDIOLOGY | Facility: CLINIC | Age: 80
End: 2025-08-27
Payer: MEDICARE

## 2025-08-27 DIAGNOSIS — F02.818 ALZHEIMER'S DEMENTIA OF OTHER ONSET, WITH OTHER BEHAVIORAL DISTURBANCE, UNSPECIFIED DEMENTIA SEVERITY: ICD-10-CM

## 2025-08-27 DIAGNOSIS — I49.1 PREMATURE ATRIAL CONTRACTIONS: ICD-10-CM

## 2025-08-27 DIAGNOSIS — I48.0 PAROXYSMAL ATRIAL FIBRILLATION: Primary | ICD-10-CM

## 2025-08-27 DIAGNOSIS — G30.8 ALZHEIMER'S DEMENTIA OF OTHER ONSET, WITH OTHER BEHAVIORAL DISTURBANCE, UNSPECIFIED DEMENTIA SEVERITY: ICD-10-CM

## 2025-08-27 DIAGNOSIS — I10 ESSENTIAL HYPERTENSION: ICD-10-CM

## 2025-08-27 PROCEDURE — 99214 OFFICE O/P EST MOD 30 MIN: CPT | Mod: S$GLB,,, | Performed by: INTERNAL MEDICINE

## 2025-08-27 PROCEDURE — 3288F FALL RISK ASSESSMENT DOCD: CPT | Mod: CPTII,S$GLB,, | Performed by: INTERNAL MEDICINE

## 2025-08-27 PROCEDURE — 1159F MED LIST DOCD IN RCRD: CPT | Mod: CPTII,S$GLB,, | Performed by: INTERNAL MEDICINE

## 2025-08-27 PROCEDURE — G2211 COMPLEX E/M VISIT ADD ON: HCPCS | Mod: S$GLB,,, | Performed by: INTERNAL MEDICINE

## 2025-08-27 PROCEDURE — 99999 PR PBB SHADOW E&M-EST. PATIENT-LVL III: CPT | Mod: PBBFAC,,, | Performed by: INTERNAL MEDICINE

## 2025-08-27 PROCEDURE — 1160F RVW MEDS BY RX/DR IN RCRD: CPT | Mod: CPTII,S$GLB,, | Performed by: INTERNAL MEDICINE

## 2025-08-27 PROCEDURE — 1101F PT FALLS ASSESS-DOCD LE1/YR: CPT | Mod: CPTII,S$GLB,, | Performed by: INTERNAL MEDICINE

## 2025-08-27 RX ORDER — LEVETIRACETAM 500 MG/1
500 TABLET ORAL 2 TIMES DAILY
COMMUNITY
Start: 2025-07-28 | End: 2026-07-28

## (undated) DEVICE — NDL 18GA X1 1/2 REG BEVEL

## (undated) DEVICE — GAUZE SPONGE 4X4 12PLY

## (undated) DEVICE — SEE MEDLINE ITEM 154981

## (undated) DEVICE — STRIP PACKING ANTIMIC 1/4X1

## (undated) DEVICE — GLOVE BIOGEL PI MICRO INDIC 8

## (undated) DEVICE — SEE L#120831

## (undated) DEVICE — BLADE SCALP OPHTL BEVEL STR

## (undated) DEVICE — KIT COLLECTION E SWAB REGULAR

## (undated) DEVICE — NDL HYPO REG 25G X 1 1/2

## (undated) DEVICE — PAD PREP CUFFED NS 24X48IN

## (undated) DEVICE — SYR 10CC LUER LOCK

## (undated) DEVICE — SWAB CULTURETTE SINGLE

## (undated) DEVICE — TOURNIQUET SB QC DP 18X4IN

## (undated) DEVICE — BANDAGE ESMARK ELASTIC ST 6X9

## (undated) DEVICE — STOCKINET 4INX48

## (undated) DEVICE — COVER OVERHEAD SURG LT BLUE

## (undated) DEVICE — SUT MCRYL PLUS 4-0 PS2 27IN

## (undated) DEVICE — DRESSING GAUZE XEROFORM 5X9

## (undated) DEVICE — GLOVE BIOGEL ECLIPSE SZ 8

## (undated) DEVICE — BLADE SURG #15 CARBON STEEL

## (undated) DEVICE — Device

## (undated) DEVICE — BANDAGE DERMACEA STRETCH 4X1IN